# Patient Record
Sex: MALE | NOT HISPANIC OR LATINO | Employment: OTHER | URBAN - METROPOLITAN AREA
[De-identification: names, ages, dates, MRNs, and addresses within clinical notes are randomized per-mention and may not be internally consistent; named-entity substitution may affect disease eponyms.]

---

## 2020-12-04 ENCOUNTER — TELEPHONE (OUTPATIENT)
Dept: NEUROLOGY | Facility: CLINIC | Age: 65
End: 2020-12-04

## 2020-12-04 NOTE — TELEPHONE ENCOUNTER
[facility] Call Center    Phone Message    May a detailed message be left on voicemail: yes     Reason for Call: Other: Patient calling to check status of hearing from clinic. States that he has some questions and really would like to discuss with nurse and care team about who the best neurologist would be for him.     Please advise and call patient back at your earliest convenience to discuss     Action Taken: Other: [facility] NEUROLOGY    Travel Screening: Not Applicable

## 2020-12-04 NOTE — TELEPHONE ENCOUNTER
I spoke to patient about what his symptoms are and who he is hoping to see in our clinic. He was referred to neurology and was recommended to see Dr. Linda Banks. Since Dr. Banks only sees MS patients he is wondering who he should see. I spoke with him and he reviewed his symptoms with me.     He said they started about 6 months ago when his left arm and hand suddenly started freezing up. This has continued since then. He has little function and sensation in his left ring and pinky fingers. He drops things sometimes. Sometimes his left shoulder will freeze up. He has pain in his neck/shoulders bilaterally. He is tired all the time. He is having balance issues. Some trouble putting on his shirt; confusing which arm goes in which hole.     He has history of anxiety and depression so is anxious about seeing a new provider. Call center scheduled him with Dr. Gordillo. I let him know she is a wonderful provider and it would be good for him to see her.     Pt likes natural medicine/remidies and would like to incorporate this into care if possible.     I will update Dr. Gordillo on this conversation.     Vicki STYLES

## 2020-12-04 NOTE — TELEPHONE ENCOUNTER
[facility] Call Center    Phone Message    May a detailed message be left on voicemail: yes     Reason for Call: Other: Braulio calling to request a call back. He would like to speak to a nurse in regards to wanting to find a provider who specializes in functional medicine. Please call him back to discuss.      Action Taken: Message routed to:  [facility]jendra neuro     Travel Screening: Not Applicable                                                                    \

## 2020-12-08 ENCOUNTER — TELEPHONE (OUTPATIENT)
Dept: NEUROLOGY | Facility: CLINIC | Age: 65
End: 2020-12-08

## 2020-12-08 NOTE — TELEPHONE ENCOUNTER
[facility] Call [facility]    Phone Message    May a detailed message be left on voicemail: yes     Reason for Call: Other: Braulio calling to request a call back. He states his symtoms are worsening and would like to be seen as soon as possible. Pt has apt with Dr. Gordillo on 12/21. Please call him back to discuss.      Action Taken: Message routed to:  [facility]  neuro     Travel Screening: Not Applicable

## 2020-12-09 ENCOUNTER — APPOINTMENT (OUTPATIENT)
Dept: CT IMAGING | Facility: CLINIC | Age: 65
End: 2020-12-09
Attending: EMERGENCY MEDICINE
Payer: COMMERCIAL

## 2020-12-09 ENCOUNTER — HOSPITAL ENCOUNTER (INPATIENT)
Facility: CLINIC | Age: 65
LOS: 2 days | Discharge: HOME | End: 2020-12-11
Attending: EMERGENCY MEDICINE | Admitting: INTERNAL MEDICINE
Payer: COMMERCIAL

## 2020-12-09 ENCOUNTER — OFFICE VISIT (OUTPATIENT)
Dept: NEUROLOGY | Facility: CLINIC | Age: 65
End: 2020-12-09
Payer: COMMERCIAL

## 2020-12-09 ENCOUNTER — DOCUMENTATION ONLY (OUTPATIENT)
Dept: NEUROLOGY | Facility: CLINIC | Age: 65
End: 2020-12-09

## 2020-12-09 ENCOUNTER — APPOINTMENT (OUTPATIENT)
Dept: GENERAL RADIOLOGY | Facility: CLINIC | Age: 65
End: 2020-12-09
Attending: EMERGENCY MEDICINE
Payer: COMMERCIAL

## 2020-12-09 VITALS
OXYGEN SATURATION: 96 % | SYSTOLIC BLOOD PRESSURE: 228 MMHG | DIASTOLIC BLOOD PRESSURE: 145 MMHG | HEART RATE: 107 BPM | RESPIRATION RATE: 18 BRPM

## 2020-12-09 DIAGNOSIS — R20.0 LEFT UPPER EXTREMITY NUMBNESS: ICD-10-CM

## 2020-12-09 DIAGNOSIS — I63.9 CEREBROVASCULAR ACCIDENT (CVA), UNSPECIFIED MECHANISM (H): Primary | ICD-10-CM

## 2020-12-09 DIAGNOSIS — I16.0 HYPERTENSIVE URGENCY: ICD-10-CM

## 2020-12-09 DIAGNOSIS — I16.0 HYPERTENSIVE URGENCY, MALIGNANT: Primary | ICD-10-CM

## 2020-12-09 DIAGNOSIS — Z20.828 EXPOSURE TO SARS-ASSOCIATED CORONAVIRUS: ICD-10-CM

## 2020-12-09 DIAGNOSIS — R93.0 ABNORMAL HEAD CT: ICD-10-CM

## 2020-12-09 LAB
ALBUMIN SERPL-MCNC: 3.9 G/DL (ref 3.4–5)
ALP SERPL-CCNC: 101 U/L (ref 40–150)
ALT SERPL W P-5'-P-CCNC: 25 U/L (ref 0–70)
ANION GAP SERPL CALCULATED.3IONS-SCNC: 9 MMOL/L (ref 3–14)
AST SERPL W P-5'-P-CCNC: 15 U/L (ref 0–45)
BASOPHILS # BLD AUTO: 0 10E9/L (ref 0–0.2)
BASOPHILS NFR BLD AUTO: 0.3 %
BILIRUB SERPL-MCNC: 0.6 MG/DL (ref 0.2–1.3)
BUN SERPL-MCNC: 14 MG/DL (ref 7–30)
CALCIUM SERPL-MCNC: 9.2 MG/DL (ref 8.5–10.1)
CHLORIDE SERPL-SCNC: 107 MMOL/L (ref 94–109)
CO2 SERPL-SCNC: 24 MMOL/L (ref 20–32)
CREAT SERPL-MCNC: 0.9 MG/DL (ref 0.66–1.25)
DIFFERENTIAL METHOD BLD: NORMAL
EOSINOPHIL # BLD AUTO: 0 10E9/L (ref 0–0.7)
EOSINOPHIL NFR BLD AUTO: 0.2 %
ERYTHROCYTE [DISTWIDTH] IN BLOOD BY AUTOMATED COUNT: 12.1 % (ref 10–15)
GFR SERPL CREATININE-BSD FRML MDRD: >90 ML/MIN/{1.73_M2}
GLUCOSE SERPL-MCNC: 106 MG/DL (ref 70–99)
HCT VFR BLD AUTO: 48.4 % (ref 40–53)
HGB BLD-MCNC: 16.5 G/DL (ref 13.3–17.7)
IMM GRANULOCYTES # BLD: 0 10E9/L (ref 0–0.4)
IMM GRANULOCYTES NFR BLD: 0.3 %
INR PPP: 1.04 (ref 0.86–1.14)
INTERPRETATION ECG - MUSE: NORMAL
LYMPHOCYTES # BLD AUTO: 1.9 10E9/L (ref 0.8–5.3)
LYMPHOCYTES NFR BLD AUTO: 21.1 %
MAGNESIUM SERPL-MCNC: 2.6 MG/DL (ref 1.6–2.3)
MCH RBC QN AUTO: 30.1 PG (ref 26.5–33)
MCHC RBC AUTO-ENTMCNC: 34.1 G/DL (ref 31.5–36.5)
MCV RBC AUTO: 88 FL (ref 78–100)
MONOCYTES # BLD AUTO: 0.6 10E9/L (ref 0–1.3)
MONOCYTES NFR BLD AUTO: 6.8 %
NEUTROPHILS # BLD AUTO: 6.2 10E9/L (ref 1.6–8.3)
NEUTROPHILS NFR BLD AUTO: 71.3 %
NRBC # BLD AUTO: 0 10*3/UL
NRBC BLD AUTO-RTO: 0 /100
NT-PROBNP SERPL-MCNC: 91 PG/ML (ref 0–900)
PLATELET # BLD AUTO: 271 10E9/L (ref 150–450)
POTASSIUM SERPL-SCNC: 3.2 MMOL/L (ref 3.4–5.3)
PROT SERPL-MCNC: 7.5 G/DL (ref 6.8–8.8)
RADIOLOGIST FLAGS: ABNORMAL
RBC # BLD AUTO: 5.49 10E12/L (ref 4.4–5.9)
SARS-COV-2 RNA SPEC QL NAA+PROBE: NORMAL
SODIUM SERPL-SCNC: 140 MMOL/L (ref 133–144)
SPECIMEN SOURCE: NORMAL
TROPONIN I SERPL-MCNC: <0.015 UG/L (ref 0–0.04)
TSH SERPL DL<=0.005 MIU/L-ACNC: 2.1 MU/L (ref 0.4–4)
WBC # BLD AUTO: 8.8 10E9/L (ref 4–11)

## 2020-12-09 PROCEDURE — 84484 ASSAY OF TROPONIN QUANT: CPT | Performed by: EMERGENCY MEDICINE

## 2020-12-09 PROCEDURE — 120N000003 HC R&B IMCU UMMC

## 2020-12-09 PROCEDURE — 70496 CT ANGIOGRAPHY HEAD: CPT | Mod: 26 | Performed by: RADIOLOGY

## 2020-12-09 PROCEDURE — 99223 1ST HOSP IP/OBS HIGH 75: CPT | Mod: AI | Performed by: INTERNAL MEDICINE

## 2020-12-09 PROCEDURE — 83735 ASSAY OF MAGNESIUM: CPT | Performed by: EMERGENCY MEDICINE

## 2020-12-09 PROCEDURE — 99285 EMERGENCY DEPT VISIT HI MDM: CPT | Mod: 25 | Performed by: EMERGENCY MEDICINE

## 2020-12-09 PROCEDURE — 71046 X-RAY EXAM CHEST 2 VIEWS: CPT

## 2020-12-09 PROCEDURE — 70498 CT ANGIOGRAPHY NECK: CPT | Mod: 26 | Performed by: RADIOLOGY

## 2020-12-09 PROCEDURE — 71046 X-RAY EXAM CHEST 2 VIEWS: CPT | Mod: 26 | Performed by: RADIOLOGY

## 2020-12-09 PROCEDURE — 85025 COMPLETE CBC W/AUTO DIFF WBC: CPT | Performed by: EMERGENCY MEDICINE

## 2020-12-09 PROCEDURE — 93005 ELECTROCARDIOGRAM TRACING: CPT | Performed by: EMERGENCY MEDICINE

## 2020-12-09 PROCEDURE — 250N000011 HC RX IP 250 OP 636: Performed by: EMERGENCY MEDICINE

## 2020-12-09 PROCEDURE — 80053 COMPREHEN METABOLIC PANEL: CPT | Performed by: EMERGENCY MEDICINE

## 2020-12-09 PROCEDURE — 84443 ASSAY THYROID STIM HORMONE: CPT | Performed by: EMERGENCY MEDICINE

## 2020-12-09 PROCEDURE — 93010 ELECTROCARDIOGRAM REPORT: CPT | Performed by: EMERGENCY MEDICINE

## 2020-12-09 PROCEDURE — 99202 OFFICE O/P NEW SF 15 MIN: CPT | Mod: GC | Performed by: STUDENT IN AN ORGANIZED HEALTH CARE EDUCATION/TRAINING PROGRAM

## 2020-12-09 PROCEDURE — 96374 THER/PROPH/DIAG INJ IV PUSH: CPT | Mod: 59 | Performed by: EMERGENCY MEDICINE

## 2020-12-09 PROCEDURE — 83880 ASSAY OF NATRIURETIC PEPTIDE: CPT | Performed by: EMERGENCY MEDICINE

## 2020-12-09 PROCEDURE — 70450 CT HEAD/BRAIN W/O DYE: CPT | Mod: 26 | Performed by: RADIOLOGY

## 2020-12-09 PROCEDURE — 70498 CT ANGIOGRAPHY NECK: CPT

## 2020-12-09 PROCEDURE — C9803 HOPD COVID-19 SPEC COLLECT: HCPCS | Performed by: EMERGENCY MEDICINE

## 2020-12-09 PROCEDURE — 96376 TX/PRO/DX INJ SAME DRUG ADON: CPT | Performed by: EMERGENCY MEDICINE

## 2020-12-09 PROCEDURE — 85610 PROTHROMBIN TIME: CPT | Performed by: EMERGENCY MEDICINE

## 2020-12-09 PROCEDURE — U0003 INFECTIOUS AGENT DETECTION BY NUCLEIC ACID (DNA OR RNA); SEVERE ACUTE RESPIRATORY SYNDROME CORONAVIRUS 2 (SARS-COV-2) (CORONAVIRUS DISEASE [COVID-19]), AMPLIFIED PROBE TECHNIQUE, MAKING USE OF HIGH THROUGHPUT TECHNOLOGIES AS DESCRIBED BY CMS-2020-01-R: HCPCS | Performed by: EMERGENCY MEDICINE

## 2020-12-09 PROCEDURE — 70450 CT HEAD/BRAIN W/O DYE: CPT

## 2020-12-09 RX ORDER — HYDRALAZINE HYDROCHLORIDE 20 MG/ML
10 INJECTION INTRAMUSCULAR; INTRAVENOUS ONCE
Status: COMPLETED | OUTPATIENT
Start: 2020-12-09 | End: 2020-12-09

## 2020-12-09 RX ORDER — LIDOCAINE 40 MG/G
CREAM TOPICAL
Status: DISCONTINUED | OUTPATIENT
Start: 2020-12-09 | End: 2020-12-11 | Stop reason: HOSPADM

## 2020-12-09 RX ORDER — ACETAMINOPHEN 325 MG/1
650 TABLET ORAL EVERY 4 HOURS PRN
Status: DISCONTINUED | OUTPATIENT
Start: 2020-12-09 | End: 2020-12-11 | Stop reason: HOSPADM

## 2020-12-09 RX ORDER — HYDRALAZINE HYDROCHLORIDE 20 MG/ML
10 INJECTION INTRAMUSCULAR; INTRAVENOUS ONCE
Status: COMPLETED | OUTPATIENT
Start: 2020-12-09 | End: 2020-12-10

## 2020-12-09 RX ORDER — IOPAMIDOL 755 MG/ML
75 INJECTION, SOLUTION INTRAVASCULAR ONCE
Status: COMPLETED | OUTPATIENT
Start: 2020-12-09 | End: 2020-12-09

## 2020-12-09 RX ADMIN — IOPAMIDOL 75 ML: 755 INJECTION, SOLUTION INTRAVENOUS at 20:00

## 2020-12-09 RX ADMIN — HYDRALAZINE HYDROCHLORIDE 10 MG: 20 INJECTION INTRAMUSCULAR; INTRAVENOUS at 21:02

## 2020-12-09 RX ADMIN — HYDRALAZINE HYDROCHLORIDE 10 MG: 20 INJECTION INTRAMUSCULAR; INTRAVENOUS at 19:42

## 2020-12-09 ASSESSMENT — MIFFLIN-ST. JEOR: SCORE: 1769.68

## 2020-12-09 ASSESSMENT — PAIN SCALES - GENERAL: PAINLEVEL: MILD PAIN (2)

## 2020-12-09 NOTE — PATIENT INSTRUCTIONS
1. You will be transferred to the [facility] Emergency Department for blood pressure control and further work-up   2. Please follow-up with me in clinic after being seen in the ED.

## 2020-12-09 NOTE — PROGRESS NOTES
DEPARTMENT OF NEUROLOGY    Patient Name:  Braulio Guardado  MRN:  [id number]    :  1955  Date of Clinic Visit:  2020  Primary Care Provider:  No primary care provider on file.        INITIAL APPOINTMENT      HPI:   Braulio Guardado is a 65yr old male w/ PMHx severe anxiety and PTSD who doctor's infrequently who arrives in clinic today w/ complaints of L ring/pinkie finger numbness for the past 6mo. However, before visit could fully commence, vitals were taken and noted a /145 w/ . This was then checked twice manually and also noted elevated -130's. Pt endorsed feelings of anxiety, was diaphoretic, and noted pain in his R side neck. However, this last he says he has had for several weeks and is not worse than normal. He denied any HA, vision changes, hearing changes, CP, SOB, palpitations, arm/jaw numbness, other extremity numbness/weakness besides L ring/pinkie fingers, cough, rhinorrhea, recent illness, or sick contacts. A rapid response was called and EKG performed, which showed sinus tachycardia w/o ST changes    As to his past medical hx, the patient reports that he goes to the doctor very infrequently, but has never been told he had high blood pressure before. He recalls that the few times he's checked it, it may have been in the 160 range. However, he has never been placed on medications for this. He denies any other medical conditions besides severe anxiety and PTSD, saying the coming to the doctor makes him nervous. He is not currently on any medications besides various vitamin supplements (B12, Folate, Vit D, Zinc). He denies using any herbal supplements or recent drug use. However, he does endorse occasional marijuana use in the past.      Vital signs:      BP: (!) 228/145 Pulse: 107   Resp: 18 SpO2: 96 %          There is no height or weight on file to calculate BMI.  BP (!) 228/145   Pulse 107   Resp 18   SpO2 96%       Examination:     -General: Sitting in chair,  anxious appearing, diaphoretic    -HEENT: Normocephalic. PERRL. No skin discolorations noted, no carotid bruit    -Cardio: Regular rhythm, tachycardic, without murmurs or bruits     -Pulmonary: Symmetric chest rise, no increased work of breathing, lungs clear to auscultation bilterally    -Abdomen: Positive bowel sounds, soft, non-tender, no organomegaly    -Neurological:     --MS: Patient is alert, attentive, and oriented. Speech is clear and fluid.     --CNs: Visual fields are full to confrontation. Pupils are briskly reactive to light. Visual fields full. Ocular motility full without nystagmus, facial sensation intact, muscles of mastication and facial expression normal, hearing intact, palate elevation normal, sternomastoid and trapezius function normal, tongue motions normal     --Motor: Normal muscle tone and bulk. 5/5 muscle strength bilaterally including  strength and finger spread     --Reflexes: Deferred    --Sensory: Light touch sensation intact and symmetric in bilateral extremities except for L ring/pinkie finger which are ~50% decreased     --Coordination: Deferred    --Gait: Stands with feet normally spaced. Gait is steady.      INVESTIGATIONS:  All available and relevant labs, imaging, and other procedures were reviewed with the patient at this visit. Those of particular significance are listed below:    EKG (12/9/2020): Sinus tachycardia w/o ST changes, possible LVH    IMPRESSION/RECOMMENDATIONS:   Braulio Guardado is a 65yr old male w/ PMHx severe anxiety and PTSD who doctor's infrequently who arrives in clinic today w/ complaints of L ring/pinkie finger numbness for the past 6mo but was found to be in HTN Emergency on exam. While patient denied any symptoms of end-organ damage associated w/ this high BP, its level of elevation is concerning enough that I believe he should be seen in the ED emergently for blood pressure control and further work-up. EKG here is neg for ischemic changes and notes  only sinus tachycardia. Currently, pt is mentating normally and continues to deny symptoms concerning for ACS. Rapid response called for stabilization and transport to the ED. Potentially pt's blood pressure is a combination of as-yet undiagnosed underlying HTN and increased anxiety with doctor visit, though other etiologies should be investigated. I have already contacted [facility] ED to give sign-out and inform them of pt's imminent arrival    I do not believe the pt's L ring/pinkie finger numbness is associated with his current HTN. From the brief history and exam taken concerning this numbness, it sounds most congruent with ulnar nerve compression. However, as a formal exam and further history could not be obtained, I recommend that he re-schedule an appointment after being seen in the ED.    Patient has been seen with Dr. Irwin Perez who agrees with my assessment and plan    Elena Mejia MD  [facility] Department of Neurology PGY1  Pager: [phone]      Physician Attestation   I, Irwin Perez, DO, saw this patient and agree with the findings and plan of care as documented in the note.      65-year-old male who self referred to neurology for 6 months of left arm tingling.  He states he has not really seen a doctor in the last 5 years.  On arrival his blood pressure was 230/145 which was confirmed on manual blood pressure testing by myself and the resident.  He appeared anxious and sweaty.  I swiftly evaluated his left upper extremity symptoms and he reports numbness mostly involving the fourth and fifth digits.  He has no significant weakness.  Due to significantly high blood pressure, a rapid response was called, EKG was checked, and patient was transferred to the ED.  His left upper extremity numbness was not fully evaluated.  May represent ulnar neuropathy given the location involving 4th and 5th digits.  Very mild ADM weakness as well.  However given very high BP would  probably recommend brain imaging as well to look for CNS source.   I also messaged schedulers and he has follow-up scheduled with Dr. Gordillo on 12/21 when bp better controlled to fully evaluate his left upper extremity symptoms in consultation.      Total time spent face-to-face with patient discussing red flag symptoms and rechecking blood pressure as well as coordinating response team for ED transfer:  20 minutes.    Items personally reviewed/procedural attestation: vitals, labs and EKG and agree with the interpretation documented in the note.    Irwin Perez, DO

## 2020-12-09 NOTE — ED PROVIDER NOTES
[facility] EMERGENCY DEPARTMENT ([facility])  12/09/20  History     Chief Complaint   Patient presents with     Hypertension     Numbness     The history is provided by the patient.     Braulio Guardado is a 65 year old male with a medical history signficant for anxiety and PTSD, who has been dealing w/ 6 months of LUE/finger numbness, but know other known diagnoses (says isn't evaluated medically often), presents to the Emergency Department from clinic for evaluation of his LUE numbness and significant hypertension found incidentally at clinic.     Per chart review, patient has seen Neurology (12/04/2020) due to complaints of left arm and hand numbness for the past 6 months. Prior to the arrival to [facility] ED, patient saw [facility] Neurology today due to complaints of numbness and tingling in his left hand for the last few months. At that appointment, they performed vitals, and found him to be significantly hypertensive. BPs 220's/140's. EKG performed in clinic reportedly showed sinus tachycardia w/o ST changes. He was then sent to the ED from clinic for further evaluation/management.     Currently patient reports feeling OK. He has a history of some strong anxiety, and so this has all been stressful for him, but no other acute MH concerns. The LUE numbness is located primarily in the 5th, maybe 4th digit. No weakness noted. Has been ongoing for 6 months, no recent changes. No CP or breathing sx's. No fevers or chills. No bowel/bladder sx's. No vision/hearing changes or HAs. No syncope/near syncope. No abdominal/GI sx's. No rashes/skin sx's. No neck stiffness or albert pain, but will get muscle cramps bilateral paraspinal/trapezius areas bilaterally sometimes (like since sitting in the hospital gurney, etc.). No exertional or pleuritic sx's. Not on any meds. Pt reportedly does not go in for regular medical evaluations. No other symptoms or complaints at this time. Please see ROS for further details.    I have  "reviewed the Medications, Allergies, Past Medical and Surgical History, and Social History in the [facility] system.    PAST MEDICAL HISTORY: Anxiety, PTSD  - 6 months of LUE/finger numbness (no formal diagnosis yet)    PAST SURGICAL HISTORY: No past surgical history on file.    Past medical history, past surgical history, medications, and allergies were reviewed with the patient.     FAMILY HISTORY: No family history on file.    SOCIAL HISTORY:   Social History     Tobacco Use     Smoking status: Not on file   Substance Use Topics     Alcohol use: Not on file     Social history was reviewed with the patient.       Patient's Medications    No medications on file        No Known Allergies     Review of Systems   Constitutional: Negative for fatigue and fever.   HENT: Negative.    Eyes: Negative for visual disturbance.   Respiratory: Negative for cough and shortness of breath.    Cardiovascular: Negative for chest pain.   Gastrointestinal: Negative for nausea and vomiting.   Genitourinary: Negative.    Musculoskeletal: Negative for neck stiffness.        Cramping in neck muscles   Skin: Negative for color change and rash.   Allergic/Immunologic: Negative for immunocompromised state.   Neurological: Positive for numbness (LUE, 5th/4th digitis). Negative for seizures, syncope, weakness, light-headedness and headaches.   Psychiatric/Behavioral: Negative for suicidal ideas. The patient is nervous/anxious.    All other systems reviewed and are negative.        Physical Exam   BP: (!) 216/136  Pulse: 85  Temp: 98.4  F (36.9  C)  Resp: 18  Height: 180.3 cm (5' 11\")  Weight: 95.3 kg (210 lb)  SpO2: 94 %      Physical Exam  CONSTITUTIONAL: Well-developed and well-nourished. Awake and alert. Non-toxic appearance. No acute distress.   HENT:   - Head: Normocephalic and atraumatic.   - Ears: Hearing and external ear grossly normal.   - Nose: Nose normal. No rhinorrhea. No epistaxis.   - Mouth/Throat: MMM  EYES: Conjunctivae and lids are " normal. No scleral icterus.   NECK: Normal range of motion and phonation normal. Neck supple.  No tracheal deviation, no stridor. No edema or erythema noted.  CARDIOVASCULAR: Normal rate, regular rhythm and no appreciable abnormal heart sounds.  PULMONARY/CHEST: Normal work of breathing. No accessory muscle usage or stridor. No respiratory distress.  No appreciable abnormal breath sounds.  ABDOMEN: Soft, non-distended. No tenderness. No rigidity, rebound or guarding.   MUSCULOSKELETAL: Extremities warm and seemingly well perfused. No edema or calf tenderness.  NEUROLOGIC: Awake, alert. Not disoriented. He displays no atrophy and no tremor. Normal tone. No seizure activity. GCS 15, reports numbness left pinky, part of ring finger. No strength/weakness.   SKIN: Skin is warm and dry. No rash noted. No diaphoresis. No pallor.   PSYCHIATRIC: Normal mood and affect. Speech and behavior normal. Thought processes linear. Cognition and memory are normal.   ED Course          EKG Interpretation:      Interpreted by Noy Chowdhury MD  Time reviewed: 18:04  Symptoms at time of EKG: Numbness   Rhythm: normal sinus   Rate: 73  Axis: Normal  Ectopy: none  Conduction: normal  ST Segments/ T Waves: T wave inversion III and aVF  Comparison to prior: No previous  Clinical Impression: Sinus, T Wave Inversions in III and avf         Assessments & Plan (with Medical Decision Making)   IMPRESSION: 65 year old male w/ PMH notable for anxiety and PTSD, who has been dealing w/ 6 months of LUE/finger numbness, but know other known diagnoses (says isn't evaluated medically often), presents to the Emergency Department from clinic for evaluation of his LUE numbness and significant hypertension found incidentally at clinic.     Clinically, patient appears nontoxic, NAD . Vitals show BP elevated to 220's/130-140's, otherwise no acute findings on vitals. Otherwise on examination, has the reported altered sensation left pink/5th fingers, but  "otherwise no acute findings.     Ddx includes, but not limited to, hypertensive urgency or emergency, chronic strokes in setting of HTN, ICH, malignancy, metabolic/endocrine derangement, et al.     PLAN: ECG, labs, head/neck imaging, chest imaging  - Disposition pending ED course and findings.   - Ongoing BP monitoring and judicious antihypertensive management    RESULTS:  - Labs: No acute findings outside of mild hypokalemia, slight elevation of Mg  - Imaging: Written preliminary reports reviewed:  --- CXR: \"Negative chest.\"  --- CT Head: \"Multifocal ill-defined regions of patchy hypodensity at the gray-white matter junction in the anterior right parietal lobe and peripheral right parieto-occipital lobe. Smaller hypodense focus in the left subcortical white matter. These findings would be atypical for cerebral infarct. Recommend contrast enhanced MRI for further characterization.\"  --- CTA Head Neck:  \"1. Head CTA demonstrates no aneurysm or stenosis of the major intracranial arteries. 2. Neck CTA demonstrates no stenosis of the major cervical arteries.\"  --- Results/reports reviewed w/ patient who expresses understanding of findings and F/U recommendations.    INTERVENTIONS:   - IV Hydralazine (10mg IV x2)    RE-EVALUATION:  - Pt otherwise continues to do well here in the ED, no acute issues or apparent concerning changes in vitals or clinical appearance.    DISCUSSIONS:  - w/ IM: Reviewed patient/presentation, current state of workup/any pending studies. They will admit for further evaluation/management, F/U pending studies as needed, coordinate w/ consulting services as needed. No additional requests/recommendations for workup/management for in the ED at this time.   - w/ Patient: I have reviewed the available findings, plan with the patient. He expressed understanding and agreement with this plan. All questions answered to the best of our ability at this time.     DISPOSITION/PLANNING:  - IMPRESSION: " Hypertensive Urgency, brain lesions/findings/abnormal head CT, LUE (finger numbness), anxiety  - DISPOSITION: Admit to IM for further evaluation/management  - PENDING: Asymptomatic COVID  - RECOMMENDATIONS: Neuro consult, MRI (w/ contrast, head/neck) in the morning      ______________________________________________________________________      12/9/2020   [facility] EMERGENCY DEPARTMENT     Noy Chowdhury MD  12/11/20 [id number]

## 2020-12-09 NOTE — ED NOTES
Bed: ED18  Expected date: 12/9/20  Expected time: 4:45 PM  Means of arrival: Ambulance  Comments:  HE     65M with hypertensive crisis from clinic

## 2020-12-09 NOTE — TELEPHONE ENCOUNTER
I returned pt call to discuss a sooner visit. He said his symptoms are worsening and he would like to see a provider to get treatment started. I helped him schedule with Dr. Mejia in our neurology resident clinic this afternoon 12/9 at 2:30p.     Vicki STYLES

## 2020-12-09 NOTE — LETTER
2020       RE: Braulio Guardado  [address] [place] [address]     Dear Colleague,    Thank you for referring your patient, Braulio Guardado, to the [facility] [place] at [facility]. Please see a copy of my visit note below.      DEPARTMENT OF NEUROLOGY    Patient Name:  Braulio Guardado  MRN:  [id number]    :  1955  Date of Clinic Visit:  2020  Primary Care Provider:  No primary care provider on file.    INITIAL APPOINTMENT    HPI:   Braulio Guardado is a 65yr old male w/ PMHx severe anxiety and PTSD who doctor's infrequently who arrives in clinic today w/ complaints of L ring/pinkie finger numbness for the past 6mo. However, before visit could fully commence, vitals were taken and noted a /145 w/ . This was then checked twice manually and also noted elevated -130's. Pt endorsed feelings of anxiety, was diaphoretic, and noted pain in his R side neck. However, this last he says he has had for several weeks and is not worse than normal. He denied any HA, vision changes, hearing changes, CP, SOB, palpitations, arm/jaw numbness, other extremity numbness/weakness besides L ring/pinkie fingers, cough, rhinorrhea, recent illness, or sick contacts. A rapid response was called and EKG performed, which showed sinus tachycardia w/o ST changes    As to his past medical hx, the patient reports that he goes to the doctor very infrequently, but has never been told he had high blood pressure before. He recalls that the few times he's checked it, it may have been in the 160 range. However, he has never been placed on medications for this. He denies any other medical conditions besides severe anxiety and PTSD, saying the coming to the doctor makes him nervous. He is not currently on any medications besides various vitamin supplements (B12, Folate, Vit D, Zinc). He denies using any herbal supplements or recent drug use.  However, he does endorse occasional marijuana use in the past.    Vital signs:      BP: (!) 228/145 Pulse: 107   Resp: 18 SpO2: 96 %          There is no height or weight on file to calculate BMI.  BP (!) 228/145   Pulse 107   Resp 18   SpO2 96%       Examination:     -General: Sitting in chair, anxious appearing, diaphoretic    -HEENT: Normocephalic. PERRL. No skin discolorations noted, no carotid bruit    -Cardio: Regular rhythm, tachycardic, without murmurs or bruits     -Pulmonary: Symmetric chest rise, no increased work of breathing, lungs clear to auscultation bilterally    -Abdomen: Positive bowel sounds, soft, non-tender, no organomegaly    -Neurological:     --MS: Patient is alert, attentive, and oriented. Speech is clear and fluid.     --CNs: Visual fields are full to confrontation. Pupils are briskly reactive to light. Visual fields full. Ocular motility full without nystagmus, facial sensation intact, muscles of mastication and facial expression normal, hearing intact, palate elevation normal, sternomastoid and trapezius function normal, tongue motions normal     --Motor: Normal muscle tone and bulk. 5/5 muscle strength bilaterally including  strength and finger spread     --Reflexes: Deferred    --Sensory: Light touch sensation intact and symmetric in bilateral extremities except for L ring/pinkie finger which are ~50% decreased     --Coordination: Deferred    --Gait: Stands with feet normally spaced. Gait is steady.      INVESTIGATIONS:  All available and relevant labs, imaging, and other procedures were reviewed with the patient at this visit. Those of particular significance are listed below:    EKG (12/9/2020): Sinus tachycardia w/o ST changes, possible LVH    IMPRESSION/RECOMMENDATIONS:   Braulio Guardado is a 65yr old male w/ PMHx severe anxiety and PTSD who doctor's infrequently who arrives in clinic today w/ complaints of L ring/pinkie finger numbness for the past 6mo but was found to be in  HTN Emergency on exam. While patient denied any symptoms of end-organ damage associated w/ this high BP, its level of elevation is concerning enough that I believe he should be seen in the ED emergently for blood pressure control and further work-up. EKG here is neg for ischemic changes and notes only sinus tachycardia. Currently, pt is mentating normally and continues to deny symptoms concerning for ACS. Rapid response called for stabilization and transport to the ED. Potentially pt's blood pressure is a combination of as-yet undiagnosed underlying HTN and increased anxiety with doctor visit, though other etiologies should be investigated. I have already contacted [facility] ED to give sign-out and inform them of pt's imminent arrival    I do not believe the pt's L ring/pinkie finger numbness is associated with his current HTN. From the brief history and exam taken concerning this numbness, it sounds most congruent with ulnar nerve compression. However, as a formal exam and further history could not be obtained, I recommend that he re-schedule an appointment after being seen in the ED.    Patient has been seen with Dr. Irwin Perez who agrees with my assessment and plan    Elena Mejia MD  [facility] Department of Neurology PGY1  Pager: [phone]      Physician Attestation   I, Irwin Perez, DO, saw this patient and agree with the findings and plan of care as documented in the note.      65-year-old male who self referred to neurology for 6 months of left arm tingling.  He states he has not really seen a doctor in the last 5 years.  On arrival his blood pressure was 230/145 which was confirmed on manual blood pressure testing by myself and the resident.  He appeared anxious and sweaty.  I swiftly evaluated his left upper extremity symptoms and he reports numbness mostly involving the fourth and fifth digits.  He has no significant weakness.  Due to significantly high blood pressure, a  rapid response was called, EKG was checked, and patient was transferred to the ED.  His left upper extremity numbness was not fully evaluated.  May represent ulnar neuropathy given the location involving 4th and 5th digits.  Very mild ADM weakness as well.  However given very high BP would probably recommend brain imaging as well to look for CNS source.   I also messaged schedulers and he has follow-up scheduled with Dr. Gordillo on 12/21 when bp better controlled to fully evaluate his left upper extremity symptoms in consultation.      Total time spent face-to-face with patient discussing red flag symptoms and rechecking blood pressure as well as coordinating response team for ED transfer:  20 minutes.    Items personally reviewed/procedural attestation: vitals, labs and EKG and agree with the interpretation documented in the note.    Irwin Perez, DO

## 2020-12-09 NOTE — ED TRIAGE NOTES
Patient arrives via EMS from neurology clinic and had a BP of 214/130. Patient was being seen for L hand numbness for the past 6 months, and worsening balance & cognition. Patient does not see a doctor regularly, not on any home medication. Of note, patient has been struggling with depression & anxiety. EKG sinus tach.    Yocasta Weinstein RN on 12/9/2020 at 4:58 PM

## 2020-12-10 ENCOUNTER — APPOINTMENT (OUTPATIENT)
Dept: CARDIOLOGY | Facility: CLINIC | Age: 65
End: 2020-12-10
Attending: STUDENT IN AN ORGANIZED HEALTH CARE EDUCATION/TRAINING PROGRAM
Payer: COMMERCIAL

## 2020-12-10 ENCOUNTER — APPOINTMENT (OUTPATIENT)
Dept: ULTRASOUND IMAGING | Facility: CLINIC | Age: 65
End: 2020-12-10
Attending: STUDENT IN AN ORGANIZED HEALTH CARE EDUCATION/TRAINING PROGRAM
Payer: COMMERCIAL

## 2020-12-10 ENCOUNTER — APPOINTMENT (OUTPATIENT)
Dept: MRI IMAGING | Facility: CLINIC | Age: 65
End: 2020-12-10
Payer: COMMERCIAL

## 2020-12-10 LAB
ANION GAP SERPL CALCULATED.3IONS-SCNC: 7 MMOL/L (ref 3–14)
BUN SERPL-MCNC: 12 MG/DL (ref 7–30)
CALCIUM SERPL-MCNC: 9.3 MG/DL (ref 8.5–10.1)
CHLORIDE SERPL-SCNC: 107 MMOL/L (ref 94–109)
CHOLEST SERPL-MCNC: 291 MG/DL
CO2 SERPL-SCNC: 25 MMOL/L (ref 20–32)
CREAT SERPL-MCNC: 0.82 MG/DL (ref 0.66–1.25)
GFR SERPL CREATININE-BSD FRML MDRD: >90 ML/MIN/{1.73_M2}
GLUCOSE SERPL-MCNC: 128 MG/DL (ref 70–99)
HBA1C MFR BLD: 5.2 % (ref 0–5.6)
HDLC SERPL-MCNC: 42 MG/DL
LABORATORY COMMENT REPORT: NORMAL
LDLC SERPL CALC-MCNC: 231 MG/DL
NONHDLC SERPL-MCNC: 249 MG/DL
POTASSIUM SERPL-SCNC: 3.4 MMOL/L (ref 3.4–5.3)
SARS-COV-2 RNA SPEC QL NAA+PROBE: NEGATIVE
SODIUM SERPL-SCNC: 139 MMOL/L (ref 133–144)
SPECIMEN SOURCE: NORMAL
TRIGL SERPL-MCNC: 89 MG/DL

## 2020-12-10 PROCEDURE — 93306 TTE W/DOPPLER COMPLETE: CPT | Mod: 26 | Performed by: INTERNAL MEDICINE

## 2020-12-10 PROCEDURE — 250N000011 HC RX IP 250 OP 636: Performed by: STUDENT IN AN ORGANIZED HEALTH CARE EDUCATION/TRAINING PROGRAM

## 2020-12-10 PROCEDURE — 250N000013 HC RX MED GY IP 250 OP 250 PS 637: Performed by: STUDENT IN AN ORGANIZED HEALTH CARE EDUCATION/TRAINING PROGRAM

## 2020-12-10 PROCEDURE — 80048 BASIC METABOLIC PNL TOTAL CA: CPT | Performed by: STUDENT IN AN ORGANIZED HEALTH CARE EDUCATION/TRAINING PROGRAM

## 2020-12-10 PROCEDURE — 255N000002 HC RX 255 OP 636: Performed by: INTERNAL MEDICINE

## 2020-12-10 PROCEDURE — A9585 GADOBUTROL INJECTION: HCPCS | Performed by: INTERNAL MEDICINE

## 2020-12-10 PROCEDURE — 93880 EXTRACRANIAL BILAT STUDY: CPT | Mod: 26 | Performed by: RADIOLOGY

## 2020-12-10 PROCEDURE — 72156 MRI NECK SPINE W/O & W/DYE: CPT | Mod: 26 | Performed by: RADIOLOGY

## 2020-12-10 PROCEDURE — 83036 HEMOGLOBIN GLYCOSYLATED A1C: CPT | Performed by: STUDENT IN AN ORGANIZED HEALTH CARE EDUCATION/TRAINING PROGRAM

## 2020-12-10 PROCEDURE — 80061 LIPID PANEL: CPT | Performed by: STUDENT IN AN ORGANIZED HEALTH CARE EDUCATION/TRAINING PROGRAM

## 2020-12-10 PROCEDURE — 93880 EXTRACRANIAL BILAT STUDY: CPT

## 2020-12-10 PROCEDURE — 99233 SBSQ HOSP IP/OBS HIGH 50: CPT | Mod: GC | Performed by: INTERNAL MEDICINE

## 2020-12-10 PROCEDURE — 72156 MRI NECK SPINE W/O & W/DYE: CPT

## 2020-12-10 PROCEDURE — 120N000003 HC R&B IMCU UMMC

## 2020-12-10 PROCEDURE — 96375 TX/PRO/DX INJ NEW DRUG ADDON: CPT | Performed by: EMERGENCY MEDICINE

## 2020-12-10 PROCEDURE — 70553 MRI BRAIN STEM W/O & W/DYE: CPT

## 2020-12-10 PROCEDURE — 36415 COLL VENOUS BLD VENIPUNCTURE: CPT | Performed by: STUDENT IN AN ORGANIZED HEALTH CARE EDUCATION/TRAINING PROGRAM

## 2020-12-10 PROCEDURE — 70553 MRI BRAIN STEM W/O & W/DYE: CPT | Mod: 26 | Performed by: RADIOLOGY

## 2020-12-10 PROCEDURE — 250N000011 HC RX IP 250 OP 636: Performed by: INTERNAL MEDICINE

## 2020-12-10 PROCEDURE — 99222 1ST HOSP IP/OBS MODERATE 55: CPT | Mod: GC | Performed by: PSYCHIATRY & NEUROLOGY

## 2020-12-10 PROCEDURE — 999N000208 ECHOCARDIOGRAM COMPLETE

## 2020-12-10 PROCEDURE — 96376 TX/PRO/DX INJ SAME DRUG ADON: CPT | Performed by: EMERGENCY MEDICINE

## 2020-12-10 RX ORDER — LABETALOL HYDROCHLORIDE 5 MG/ML
10 INJECTION, SOLUTION INTRAVENOUS ONCE
Status: COMPLETED | OUTPATIENT
Start: 2020-12-10 | End: 2020-12-10

## 2020-12-10 RX ORDER — ASPIRIN 81 MG/1
81 TABLET, CHEWABLE ORAL DAILY
Status: DISCONTINUED | OUTPATIENT
Start: 2020-12-11 | End: 2020-12-11 | Stop reason: HOSPADM

## 2020-12-10 RX ORDER — ACYCLOVIR 200 MG/1
10 CAPSULE ORAL ONCE
Status: DISCONTINUED | OUTPATIENT
Start: 2020-12-10 | End: 2020-12-11 | Stop reason: HOSPADM

## 2020-12-10 RX ORDER — LORAZEPAM 0.5 MG/1
0.5 TABLET ORAL ONCE
Status: COMPLETED | OUTPATIENT
Start: 2020-12-10 | End: 2020-12-10

## 2020-12-10 RX ORDER — AMLODIPINE BESYLATE 5 MG/1
5 TABLET ORAL DAILY
Status: DISCONTINUED | OUTPATIENT
Start: 2020-12-10 | End: 2020-12-11 | Stop reason: HOSPADM

## 2020-12-10 RX ORDER — ATORVASTATIN CALCIUM 40 MG/1
40 TABLET, FILM COATED ORAL EVERY EVENING
Status: DISCONTINUED | OUTPATIENT
Start: 2020-12-10 | End: 2020-12-11 | Stop reason: HOSPADM

## 2020-12-10 RX ORDER — GADOBUTROL 604.72 MG/ML
0.1 INJECTION INTRAVENOUS ONCE
Status: COMPLETED | OUTPATIENT
Start: 2020-12-10 | End: 2020-12-10

## 2020-12-10 RX ORDER — ASPIRIN 325 MG
325 TABLET ORAL ONCE
Status: COMPLETED | OUTPATIENT
Start: 2020-12-10 | End: 2020-12-10

## 2020-12-10 RX ORDER — LABETALOL HYDROCHLORIDE 5 MG/ML
10 INJECTION, SOLUTION INTRAVENOUS EVERY 4 HOURS PRN
Status: DISCONTINUED | OUTPATIENT
Start: 2020-12-10 | End: 2020-12-11 | Stop reason: HOSPADM

## 2020-12-10 RX ADMIN — AMLODIPINE BESYLATE 5 MG: 5 TABLET ORAL at 16:10

## 2020-12-10 RX ADMIN — HYDRALAZINE HYDROCHLORIDE 10 MG: 20 INJECTION INTRAMUSCULAR; INTRAVENOUS at 03:11

## 2020-12-10 RX ADMIN — GADOBUTROL 9.5 ML: 604.72 INJECTION INTRAVENOUS at 06:22

## 2020-12-10 RX ADMIN — ATORVASTATIN CALCIUM 40 MG: 40 TABLET, FILM COATED ORAL at 19:44

## 2020-12-10 RX ADMIN — LORAZEPAM 0.5 MG: 0.5 TABLET ORAL at 05:29

## 2020-12-10 RX ADMIN — ASPIRIN 325 MG ORAL TABLET 325 MG: 325 PILL ORAL at 11:06

## 2020-12-10 RX ADMIN — LABETALOL HYDROCHLORIDE 10 MG: 5 INJECTION, SOLUTION INTRAVENOUS at 12:20

## 2020-12-10 RX ADMIN — LABETALOL HYDROCHLORIDE 10 MG: 5 INJECTION, SOLUTION INTRAVENOUS at 11:34

## 2020-12-10 ASSESSMENT — ACTIVITIES OF DAILY LIVING (ADL)
ADLS_ACUITY_SCORE: 16
ADLS_ACUITY_SCORE: 16

## 2020-12-10 ASSESSMENT — MIFFLIN-ST. JEOR: SCORE: 1730.13

## 2020-12-10 NOTE — ED NOTES
[facility]    ED Nurse to Floor Handoff     Braulio Guardado is a 65 year old male who speaks English and lives alone,  in a home  They arrived in the ED by ambulance from home    ED Chief Complaint: Hypertension and Numbness    ED Dx;   Final diagnoses:   Hypertensive urgency   Left upper extremity numbness   Abnormal head CT         Needed?: No    Allergies: No Known Allergies.  Past Medical Hx: No past medical history on file.   Baseline Mental status: WDL  Current Mental Status changes: at basesline    Infection present or suspected this encounter: no  Sepsis suspected: No  Isolation type: No active isolations  Patient tested for COVID 19 prior to admission: YES     Activity level - Baseline/Home:  Independent  Activity Level - Current:   Independent    Bariatric equipment needed?: No    In the ED these meds were given:   Medications   lidocaine 1 % 0.1-1 mL (has no administration in time range)   lidocaine (LMX4) cream (has no administration in time range)   sodium chloride (PF) 0.9% PF flush 3 mL (has no administration in time range)   sodium chloride (PF) 0.9% PF flush 3 mL (has no administration in time range)   melatonin tablet 1 mg (has no administration in time range)   acetaminophen (TYLENOL) tablet 650 mg (has no administration in time range)   hydrALAZINE (APRESOLINE) injection 10 mg (10 mg Intravenous Given 12/9/20 1942)   iopamidol (ISOVUE-370) solution 75 mL (75 mLs Intravenous Given 12/9/20 2000)   sodium chloride (PF) 0.9% PF flush 90 mL (90 mLs Intravenous Given 12/9/20 2000)   hydrALAZINE (APRESOLINE) injection 10 mg (10 mg Intravenous Given 12/9/20 2102)       Drips running?  No    Home pump  No    Current LDAs       Labs results:   Labs Ordered and Resulted from Time of ED Arrival Up to the Time of Departure from the ED   COMPREHENSIVE METABOLIC PANEL - Abnormal; Notable for the following components:       Result Value    Potassium 3.2  (*)     Glucose 106 (*)     All other components within normal limits   MAGNESIUM - Abnormal; Notable for the following components:    Magnesium 2.6 (*)     All other components within normal limits   CBC WITH PLATELETS DIFFERENTIAL   INR   NT PROBNP INPATIENT   TROPONIN I   TSH WITH FREE T4 REFLEX   COVID-19 VIRUS (CORONAVIRUS) BY PCR   IP ASSIGN PROVIDER TEAM TO TREATMENT TEAM   CARDIAC CONTINUOUS MONITORING   VITAL SIGNS   PERIPHERAL IV CATHETER   ACTIVITY   VITAL SIGNS   APPLY PNEUMATIC COMPRESSION DEVICE (PCD)       Imaging Studies:   Recent Results (from the past 24 hour(s))   Chest XR,  PA & LAT    Narrative    EXAM: XR CHEST 2 VW  LOCATION: [facility]  DATE/TIME: 12/9/2020 7:52 PM    INDICATION: Hypertension, fatigue  COMPARISON: None.      Impression    IMPRESSION: Negative chest.   Head CT w/o contrast   Result Value    Radiologist flags As above (Urgent)    Narrative    CT HEAD W/O CONTRAST 12/9/2020 8:14 PM    History: LUE numbness, HTN   ICD-10:    Comparison: Same-day CTA of the head    Technique: Using multidetector thin collimation helical acquisition  technique, axial, coronal and sagittal CT images from the skull base  to the vertex were obtained without intravenous contrast.    Findings: There is no intracranial hemorrhage, mass effect, or midline  shift. Multifocal ill-defined regions of hypodensity at the gray-white  matter junctions, for example hypodensity in the gray-white matter  junction of the anterior right parietal lobe, ill-defined patchy  hypodensity in the peripheral right parieto-occipital gray-white  matter junction and smaller hypodense focus in the left subcortical  white matter. Ventricles are proportionate to the cerebral sulci. The  basal cisterns are clear.    The bony calvaria and the bones of the skull base are normal. The  visualized portions of the paranasal sinuses and mastoid air cells are  clear.       Impression    Impression:  Multifocal ill-defined  regions of patchy hypodensity at the gray-white  matter junction in the anterior right parietal lobe and peripheral  right parieto-occipital lobe. Smaller hypodense focus in the left  subcortical white matter. These findings would be atypical for  cerebral infarct. Recommend contrast enhanced MRI for further  characterization.    [Urgent Result: As above]    Finding was identified on 12/9/2020 8:17 PM.     Dr. Chowdhury was contacted by Dr. Craven at 12/9/2020 8:33 PM and  verbalized understanding of the urgent finding.     I have personally reviewed the examination and initial interpretation  and I agree with the findings.    JS BRADSHAW MD   CTA Head Neck with Contrast    Narrative    CTA  HEAD NECK WITH CONTRAST 12/9/2020 8:25 PM    CT angiogram of the neck   CT angiogram of the base of the brain with contrast  Reconstruction by the Radiologist on the 3D workstation    Provided History:  HTN, neck pain, 6 months LUE numbness  ICD-10:    Comparison:  Same-day head CT.      Technique:  HEAD and NECK CTA: During rapid bolus intravenous injection of  nonionic contrast material, axial images were obtained using thin  collimation multidetector helical technique from the base of the upper  aortic arch through the [place]. This CT angiogram data was  reconstructed at thin intervals with mild overlap. Images were sent to  the [employer] workstation, and 3D reconstructions were obtained. The  axial source images, multiplanar reformations, 3D reconstructions in  both maximum intensity projection display and volume rendered models  were reviewed, with reconstructions performed by the technologist and  the radiologist.    Contrast: iopamidol (ISOVUE-370) solution 75 mL    Findings:    Head CTA demonstrates no aneurysm or stenosis of the major  intracranial arteries. Anterior communicating artery is patent. Left  posterior communicating artery is not well-visualized. Right posterior  commuting artery is  "patent.    Neck CTA demonstrates no stenosis of the major cervical arteries. The  origins of the great vessels from the aortic arch are patent. The  normal distal right internal carotid artery measures 5 mm. The normal  distal left internal carotid artery measures 5 mm.     No mass within the visualized portions of the cervical soft tissues or  lung apices.       Impression    Impression:    1. Head CTA demonstrates no aneurysm or stenosis of the major  intracranial arteries.   2. Neck CTA demonstrates no stenosis of the major cervical arteries.    I have personally reviewed the examination and initial interpretation  and I agree with the findings.    JS BRADSHAW MD       Recent vital signs:   BP (!) 191/108   Pulse 98   Temp 98.4  F (36.9  C) (Oral)   Resp (!) 34   Ht 1.803 m (5' 11\")   Wt 95.3 kg (210 lb)   SpO2 96%   BMI 29.29 kg/m      [place] Coma Scale Score: 15 (12/09/20 1658)       Cardiac Rhythm: Normal Sinus  Pt needs tele? Yes  Skin/wound Issues: None    Code Status: Full Code    Pain control: pt had none    Nausea control: pt had none    Abnormal labs/tests/findings requiring intervention: abnormal head CT & hypertensive    Family present during ED course? No   Family Comments/Social Situation comments: has dog at home alone     Tasks needing completion: None    Yocasta Weinstein, RN  [phone] [place] [facility]  [phone] [facility] ED      "

## 2020-12-10 NOTE — PROGRESS NOTES
[facility] [place]     Daily Progress Note - [facility] Service        Date of Admission: 12/10/20      Assessment & Plan  65M with history of anxiety and PTSD who presents with hypertensive urgency and is found to have multifocal brain lesions concerning for subacute to chronic infarcts.    Multiple suspected subacute-chronic strokes  CT head on presentation demonstrated multifocal patchy hypodensities on the right side.  MR characteristics most suspicious for late subacute to chronic infarctions.  Lesions also noted to have evidence of laminar necrosis.  Radiology did recommend follow-up in 6 to 8 weeks to ensure resolution and exclude underlying mass lesion.  Though patient did initially presented to neurology clinic for mild left-sided neurologic symptoms, it is unclear if these are related or incidental.  Patient does not have a significant deficits at this time.  - Check lipids and A1c  - TTE with bubble study  - Carotid artery ultrasound  - Aspirin 325 mg now, 81 mg daily to start tomorrow  - Atorvastatin 40 mg daily  - Permissive hypertension today, will ultimately need blood pressure control  - Neurology consult, appreciate recs  - PT/OT/ST    Left-sided ring and pinky finger numbness  Patient had initially presented to neurology clinic to have this assessed.  This seems to be ongoing at this time, though strength active exam does not reveal significant deficits.  The symptoms have been going on for months, so between chronicity and ulnar nerve distribution of symptoms it is possible that this is not related to the imaging findings.  - Neurology consult as above    Hypertensive urgency  Patient presented with blood pressures of 200s/140s.  Treated with IV hydralazine in the ED.  Allowed to maintain blood pressures greater than 170s systolic and greater than 110s diastolic overnight and through today.  Has not followed regularly with a doctor, so does not have a known diagnosis of  "hypertension, likely that this is longstanding.  He will need blood pressure control and close follow-up on discharge for medication titration.  - Start amlodipine 5 mg this evening  - Labetalol prn for overnight    Hypokalemia, resolved  - Replete per protocol      Diet: Regular  Fluids: PO  DVT Prophylaxis: Ambulate  Code Status: Full    Disposition:  Expected discharge: Tomorrow, recommended to prior living arrangement once stroke workup .      Patient staffed with attending physician, Dr. Langley.    Moriah Lindo MD  Internal Medicine, PGY-2  [facility] 4 Service  Pager: [phone]  [facility]   Please see sign in/sign out for up to date coverage information  _____________________________________________________________________    Interval History  Nursing notes reviewed. No acute events overnight.  Patient feels better this morning.  Anxious about everything that has been going on.  Numbness and tingling still present in his left hand.  Not having pain elsewhere.    Physical Exam  BP (!) 158/91   Pulse 85   Temp 98.4  F (36.9  C) (Oral)   Resp 24   Ht 1.803 m (5' 11\")   Wt 95.3 kg (210 lb)   SpO2 97%   BMI 29.29 kg/m    210 lbs 0 oz    Constitutional: awake, alert, cooperative, no acute distress  Respiratory: breathing comfortably on room air, CTAB, no crackles or wheezing  Cardiovascular: regular rate and rhythm, normal S1 and S2, no murmur noted  GI: soft, non-distended, non-tender, BS+  Skin: normal skin color, texture, turgor  Musculoskeletal: no lower extremity edema present  Neurologic: alert and oriented x3, CNs II-XII grossly intact, strength symmetric      Labs and Imaging  All labs and imaging reviewed.                "

## 2020-12-10 NOTE — CONSULTS
"  [facility]     Stroke Consult Note    Reason for Consult: Stroke Code    Chief Complaint: Hypertension and Numbness      HPI  Braulio Guardado is a 65 year old male with PMHx severe anxiety, PTSD, and infrequent doctor visits who was sent from clinic to the ED on 12/9/2020 for extreme HTN. The pt had been at an appointment at the [facility] for left-sided 4th/5th digit numbness that had been ongoing for he past 6mo, as well as separate intermittent R-sided neck pain that had been present for several weeks and was no worse today, long-term L pinkie numbness associated with fracture of the digit many months ago, and more recent intermittent difficulties with coordination in the past 2-3 weeks - specifically \"knowing which arm to use to put on a shirt\". However, at the start of the visit, vitals were taken and pt was noted to have -130's w/ both automatic and manual pressures. At the time, the pt endorsed anxiety and was diaphoretic. However, he denied any  HA, vision changes, hearing changes, CP, SOB, palpitations, arm/jaw numbness, other extremity numbness/weakness besides L ring/pinkie fingers, cough, rhinorrhea, recent illness, or sick contacts. An EKG was taken at the clinic, exhibiting sinus tachycardia w/o ST changes. He was subsequently transferred to [facility] ED for HTN Urgency    Upon arrival in the ED, pt /136. Work-up ensued for HTN Urgency with BMP, EKG, CXR, and HgbA1c (5.2) returning unremarkable and lipid panel notable for . Pt also underwent a CTH (12/9/20) which showed multifocal areas in the R parietal lobe, peripheral R parieto-occipital lobe, and L subcortical white matter concerning for subacute strokes. CTA Head/Neck (12/9/20) showed no intracranial aneurysms or stenoses of major vessels intracranial/cervical arteries. With the abnormal findings on CTH, elected to obtain MRI Brain which confirmed several areas of evolving " late subacute-chronic infarcts in the R frontoparietal junction and occipital lobe, a likely subacute infarct in the R centrum semiovale, and signs of chronic microvascular ischemic disease. He was loaded with ASA 325mg with plans to initiate ASA 81mg and Atorvastatin 40mg daily. Stroke Neurology was subsequently consulted for recommendations on further work-up.    As to his HTN, pt has so far required a total of 30mg Hydralazine and 10mg Labetalol to control BP. He has also completed a TTE (12/9/20) showing a hyperkinetic LV w/ EF 65-70% w/o valvular abnormalities and intact atrial septum. Pt is to be admitted to Medicine for further management    As to his past medical hx, the patient reports that he goes to the doctor very infrequently, but has never been told he had high blood pressure before. He recalls that the few times he's checked it, it may have been in the 160 range. However, he has never been placed on medications for this. He denies any other medical conditions besides severe anxiety and PTSD, saying the coming to the doctor makes him nervous. He is not currently on any medications besides various vitamin supplements (B12, Folate, Vit D, Zinc). He denies using any herbal supplements or recent drug use. 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 However, it does not appear that he has suffered long-term or severe deficits from any of them so far. The other issues he cited, namely the neck pain and L digit pain, have been present for a longer period of time and better correlate with musculoskeletal pain and an ulnar mononeuropathy, respectively. I believe these issues may be dealt with on an outpatient basis and he already has a repeat Neurology  clinic visit scheduled with Dr Gordillo in 2wks    As to his multifocal subacute/chronic strokes, the etiology is clouded at the moment, but is likely of an embolic nature given his clean CTA despite a highly elevated LDL and imaging showing signs of ischemia in a classic wedge-shape suggestive of embolism. However, there are also areas more suggestive of microvascular disease, which would match with his newly diagnosed HTN. These multifold risk factors should be optimized prior to discharge. I agree with the initiation of ASA 81mg daily and Atorvastatin 40mg daily and will defer to the primary team for blood pressure management. This will require close outpatient follow-up, for which the pt will require a referral to establish care with a PCP as he is currently without one. I would also recommend a SW consult as pt admits to having trouble with his insurance and worries he will be unable to afford his medications. I also believe a Psych consult inpatient or referral upon discharge would not be unwarranted as pt suffers from severe anxiety/PTSD and this is likely contributing to his HTN which places him at increased risk for future stroke. Apparently he had seen a therapist in the past, but this person had to stop her practice roughly 2yrs ago for personal reasons and he has seen no one in the interim. Beyond this, would recommend on completing the remainder of the stroke work-up, including completion of his Carotid Duplex as well as long-term cardiac monitoring for potential Afib upon discharge.    Recommendations:  - Continue ASA 81mg daily  - Continue Atorvastatin 40mg daily  - Will defer to primary team for blood pressure management  - Complete Carotid Duplex US  - Cardiac Monitor upon discharge for 14d  - PCP referral upon discharge  - Psych referral upon discharge  - Follow-up as scheduled with Dr Gordillo in the Neurology Clinic in 2wks    Thank you for this consult. We will continue to follow.     The patient  was discussed with Stroke Fellow, Dr. Leigha Bradford.  The Stroke Staff is Dr. Karlie Owens.    Elena Mejia MD  Neurology Resident, PGY 1  Pager: [phone]  ______________________________________________________    Past Medical History   No past medical history on file.  Past Surgical History   No past surgical history on file.  Medications   Home Meds  Prior to Admission medications    Not on File       Scheduled Meds    amLODIPine  5 mg Oral Daily     [START ON 12/11/2020] aspirin  81 mg Oral Daily     atorvastatin  40 mg Oral QPM     sodium chloride (PF)  10 mL Intravenous Once     sodium chloride (PF)  3 mL Intracatheter Q8H     sodium chloride bacteriostatic  10 mL Intravenous Once       Infusion Meds      PRN Meds  acetaminophen, labetalol, lidocaine 4%, lidocaine (buffered or not buffered), melatonin, sodium chloride (PF)    Allergies   No Known Allergies  Family History   No family history on file.  Social History   Social History     Tobacco Use     Smoking status: Not on file   Substance Use Topics     Alcohol use: Not on file     Drug use: Not on file       Review of Systems    ROS: 10 point ROS neg other than the symptoms noted above in the HPI.    PHYSICAL EXAMINATION  Temp:  [98.4  F (36.9  C)] 98.4  F (36.9  C)  Pulse:  [] 85  Resp:  [11-34] 24  BP: (130-216)/() 160/110  SpO2:  [94 %-100 %] 97 %     General:  patient lying in bed without any acute distress, anxious appearing    HEENT:  normocephalic/atraumatic  Cardio:  RRR  Pulmonary:  no respiratory distress  Abdomen:  soft, non-tender, non-distended  Extremities:  no edema  Skin:  intact, warm/dry     Neurologic  Mental Status:  alert, oriented x 3, follows commands, speech clear and fluent, naming and repetition normal  Cranial Nerves:  visual fields intact, PERRL, EOMI with normal smooth pursuit, facial sensation intact and symmetric, facial movements symmetric, hearing not formally tested but intact to conversation, palate  elevation symmetric and uvula midline, no dysarthria, shoulder shrug strong bilaterally, tongue protrusion midline  Motor:  normal muscle tone and bulk including in L ulnar distribution, no abnormal movements, able to move all limbs spontaneously, strength 5/5 throughout upper and lower extremities except for very subtle decreased strength in L 4/5th digits, no pronator drift  Reflexes:  toes down-going  Sensory:  light touch sensation intact and symmetric throughout upper and lower extremities except decreased by 50% in L 4/5th digits, no extinction on double simultaneous stimulation   Coordination:  normal FNF on R w/ slight dysmetria on L, normal HS in bilateral legs  Station/Gait:  deferred      Imaging  I personally reviewed all imaging; relevant findings per HPI.     Lab Results Data   CBC  Recent Labs   Lab 12/09/20  1709   WBC 8.8   RBC 5.49   HGB 16.5   HCT 48.4        Basic Metabolic Panel    Recent Labs   Lab 12/10/20  0720 12/09/20  1709    140   POTASSIUM 3.4 3.2*   CHLORIDE 107 107   CO2 25 24   BUN 12 14   CR 0.82 0.90   * 106*   ALVINA 9.3 9.2     Liver Panel  Recent Labs   Lab 12/09/20  1709   PROTTOTAL 7.5   ALBUMIN 3.9   BILITOTAL 0.6   ALKPHOS 101   AST 15   ALT 25     INR    Recent Labs   Lab Test 12/09/20  1709   INR 1.04      Lipid Profile    Recent Labs   Lab Test 12/10/20  0720   CHOL 291*   HDL 42   *   TRIG 89     A1C    Recent Labs   Lab Test 12/10/20  0720   A1C 5.2     Troponin I    Recent Labs   Lab 12/09/20  1709   TROPI <0.015

## 2020-12-10 NOTE — PLAN OF CARE
Neuro: A&Ox4. Ephraim checks intact. Pt c/o numbness to the last 2 fingers of L hand, this in unchanged   Cardiac: SR. BP elevated, Norvasc started. Prn labetalol if needed.    Respiratory: Sating > 95% on RA.  GI/: Adequate urine output.   Diet/appetite: Tolerating regular diet. Eating well.  Activity:  Independent, up to chair and in halls.  Pain: At acceptable level on current regimen.   Skin: No new deficits noted.  LDA's: PIV    Plan: Continue with POC. Notify primary team with changes.

## 2020-12-10 NOTE — H&P
[facility]     History and Physical - [facility] Night Service        Date of Admission:  12/9/2020    Assessment & Plan   Braulio Guardado is a 65 year old male with PMHx of depression and anxiety admitted for L sided symptoms in the setting of urgency hypertension and subsequently found to have new intracranial CT findings.    New intracranial lesions  L sided neuro symptoms   CT findings: multifocal ill-defined regions of patchy hypodensity at the gray-white matter junction in the anterior right parietal lobe and peripheral right parieto-occipital lobe with smaller hypodense focus in the left subcortical white matter. These findings would be atypical for cerebral infarct. Suspect malignancy (mets).   -Contrast enhanced MRI brain and cervical spine for further characterization and guide management    Hypertensive Urgency  Presented with SBP > 160 with no findings of end organ dysfunction. No AUGUSTUS, transaminitis, encephalopathy or visual disturbances. No prior diagnosis of HTN on chart or per patient  -Allow permissive HTN for the next 24 hours   -Parameters: continue IV hydralazine 10 mg for SBP > 160 and DBP > 90 mmhg    Hypokalemia  Replete  -Check phosphorus      Diet:  Regular  Fluids: None  DVT Prophylaxis: Enoxaparin (Lovenox) SQ  [place] Catheter: not present  Code Status:  Full         Disposition Plan   Expected discharge: 2 - 3 days, recommended to prior living arrangement once better characterization of CT findings and urgency hypertension controlled  Entered: Yevgeniy Seo MD 12/09/2020, 10:42 PM       The patient's care was discussed with the Attending Physician, Dr. Bowen.    Yevgeniy Seo MD  [facility]   Contact information available via [facility] Paging/Directory  Please see sign in/sign out for up to date coverage  information  ______________________________________________________________________    Chief Complaint   Left sided symptoms and hypertension    History is obtained from the patient and chart    History of Present Illness   Braulio Guardado is a 65 year old male with PMHx of depression and anxiety admitted for L sided symptoms and found to have urgency hypertension at his Neuro Clinic visit.    Patient has had L sided symptoms for over 5 months. L pinky and ring finger numbness and weakness. L foot weakness as well. No pain. Reports that the L hand symptoms have been gradually worsening - noticed dropping things with the left hand. Denies headaches, vision changes, hearing changes, Chest pain, SOB, palpitations, arm/jaw numbness.     Went to see Neurology on 12/9 for his symptoms but was found to having hypertensive urgency and sent to the ED. The last doctor's visit was about 3 years ago and his BP were in the 160's. At that time, he wasn't started on any antihypertensives. He is not currently on any medications besides various vitamin supplements (B12, Folate, Vit D, Zinc). He denies using any herbal supplements or recent drug use. However, he does endorse occasional marijuana use in the past.    Review of Systems    The 10 point Review of Systems is negative other than noted in the HPI or here.     Past Medical History    I have reviewed this patient's medical history and updated it with pertinent information if needed.   No past medical history on file.     Past Surgical History   I have reviewed this patient's surgical history and updated it with pertinent information if needed.  No past surgical history on file.      Social History   Nonsmoker and occasional alcohol drinker  -Workd in the Art field    Family History   No pertinent diseases that run in his family    Prior to Admission Medications   None     Allergies   No Known Allergies    Physical Exam   Vital Signs: Temp: 98.4  F (36.9  C) Temp src: Oral BP:  (!) 168/106 Pulse: 93   Resp: 11 SpO2: 95 % O2 Device: None (Room air)    Weight: 210 lbs 0 oz    General: Laying in bed in no acute distress  HEENT: Normocephalic. PERRL. No skin discolorations noted, no carotid bruit  Cardio: RR, tachycardic, without murmurs or bruits   Pulmonary: No increased work of breathing  Abdomen: Positive bowel sounds, soft, non-tender, no organomegaly  Neuro: cranial nerves: Pupils reactive to light. No peripheral visual field deficits. No nystagmus, facial sensation intact, facial expression normal, hearing intact, sternomastoid and trapezius function normal, tongue motions normal   Motor: Normal muscle tone. 5/5 muscle strength bilaterally including  strength and finger spread   Reflexes: Deferred  Sensory: Light touch sensation intact and symmetric in bilateral extremities except for L ring/pinkie finger which are ~50% decreased   Gait: Not tested    Data   Data reviewed today: I reviewed all medications, new labs and imaging results over the last 24 hours.

## 2020-12-11 ENCOUNTER — HOSPITAL ENCOUNTER (OUTPATIENT)
Facility: CLINIC | Age: 65
Setting detail: OBSERVATION
Discharge: HOME | End: 2020-12-13
Attending: FAMILY MEDICINE | Admitting: EMERGENCY MEDICINE
Payer: COMMERCIAL

## 2020-12-11 VITALS
RESPIRATION RATE: 18 BRPM | SYSTOLIC BLOOD PRESSURE: 173 MMHG | OXYGEN SATURATION: 96 % | HEART RATE: 80 BPM | TEMPERATURE: 98.4 F | WEIGHT: 198.85 LBS | HEIGHT: 71 IN | DIASTOLIC BLOOD PRESSURE: 108 MMHG | BODY MASS INDEX: 27.84 KG/M2

## 2020-12-11 DIAGNOSIS — I16.0 HYPERTENSIVE URGENCY: ICD-10-CM

## 2020-12-11 DIAGNOSIS — Z20.828 EXPOSURE TO SARS-ASSOCIATED CORONAVIRUS: ICD-10-CM

## 2020-12-11 DIAGNOSIS — E87.6 HYPOPOTASSEMIA: ICD-10-CM

## 2020-12-11 DIAGNOSIS — F41.9 ANXIETY: ICD-10-CM

## 2020-12-11 DIAGNOSIS — I10 ESSENTIAL HYPERTENSION: ICD-10-CM

## 2020-12-11 LAB
ALBUMIN SERPL-MCNC: 3.9 G/DL (ref 3.4–5)
ALP SERPL-CCNC: 102 U/L (ref 40–150)
ALT SERPL W P-5'-P-CCNC: 34 U/L (ref 0–70)
ANION GAP SERPL CALCULATED.3IONS-SCNC: 6 MMOL/L (ref 3–14)
ANION GAP SERPL CALCULATED.3IONS-SCNC: 7 MMOL/L (ref 3–14)
AST SERPL W P-5'-P-CCNC: 20 U/L (ref 0–45)
BASOPHILS # BLD AUTO: 0.1 10E9/L (ref 0–0.2)
BASOPHILS NFR BLD AUTO: 0.6 %
BILIRUB SERPL-MCNC: 0.7 MG/DL (ref 0.2–1.3)
BUN SERPL-MCNC: 17 MG/DL (ref 7–30)
BUN SERPL-MCNC: 19 MG/DL (ref 7–30)
CALCIUM SERPL-MCNC: 9.2 MG/DL (ref 8.5–10.1)
CALCIUM SERPL-MCNC: 9.2 MG/DL (ref 8.5–10.1)
CHLORIDE SERPL-SCNC: 108 MMOL/L (ref 94–109)
CHLORIDE SERPL-SCNC: 108 MMOL/L (ref 94–109)
CO2 SERPL-SCNC: 25 MMOL/L (ref 20–32)
CO2 SERPL-SCNC: 28 MMOL/L (ref 20–32)
CREAT SERPL-MCNC: 0.9 MG/DL (ref 0.66–1.25)
CREAT SERPL-MCNC: 1.04 MG/DL (ref 0.66–1.25)
DIFFERENTIAL METHOD BLD: NORMAL
EOSINOPHIL # BLD AUTO: 0.1 10E9/L (ref 0–0.7)
EOSINOPHIL NFR BLD AUTO: 0.8 %
ERYTHROCYTE [DISTWIDTH] IN BLOOD BY AUTOMATED COUNT: 12.3 % (ref 10–15)
ERYTHROCYTE [DISTWIDTH] IN BLOOD BY AUTOMATED COUNT: 12.4 % (ref 10–15)
GFR SERPL CREATININE-BSD FRML MDRD: 76 ML/MIN/{1.73_M2}
GFR SERPL CREATININE-BSD FRML MDRD: >90 ML/MIN/{1.73_M2}
GLUCOSE SERPL-MCNC: 119 MG/DL (ref 70–99)
GLUCOSE SERPL-MCNC: 99 MG/DL (ref 70–99)
HCT VFR BLD AUTO: 47.9 % (ref 40–53)
HCT VFR BLD AUTO: 48.4 % (ref 40–53)
HGB BLD-MCNC: 15.6 G/DL (ref 13.3–17.7)
HGB BLD-MCNC: 16.5 G/DL (ref 13.3–17.7)
IMM GRANULOCYTES # BLD: 0 10E9/L (ref 0–0.4)
IMM GRANULOCYTES NFR BLD: 0.2 %
LYMPHOCYTES # BLD AUTO: 1.7 10E9/L (ref 0.8–5.3)
LYMPHOCYTES NFR BLD AUTO: 20.8 %
MCH RBC QN AUTO: 29.2 PG (ref 26.5–33)
MCH RBC QN AUTO: 30 PG (ref 26.5–33)
MCHC RBC AUTO-ENTMCNC: 32.6 G/DL (ref 31.5–36.5)
MCHC RBC AUTO-ENTMCNC: 34.1 G/DL (ref 31.5–36.5)
MCV RBC AUTO: 88 FL (ref 78–100)
MCV RBC AUTO: 90 FL (ref 78–100)
MONOCYTES # BLD AUTO: 0.8 10E9/L (ref 0–1.3)
MONOCYTES NFR BLD AUTO: 9.2 %
NEUTROPHILS # BLD AUTO: 5.7 10E9/L (ref 1.6–8.3)
NEUTROPHILS NFR BLD AUTO: 68.4 %
NRBC # BLD AUTO: 0 10*3/UL
NRBC BLD AUTO-RTO: 0 /100
PLATELET # BLD AUTO: 243 10E9/L (ref 150–450)
PLATELET # BLD AUTO: 263 10E9/L (ref 150–450)
POTASSIUM SERPL-SCNC: 3.3 MMOL/L (ref 3.4–5.3)
POTASSIUM SERPL-SCNC: 3.7 MMOL/L (ref 3.4–5.3)
PROT SERPL-MCNC: 7.5 G/DL (ref 6.8–8.8)
RBC # BLD AUTO: 5.35 10E12/L (ref 4.4–5.9)
RBC # BLD AUTO: 5.5 10E12/L (ref 4.4–5.9)
SODIUM SERPL-SCNC: 141 MMOL/L (ref 133–144)
SODIUM SERPL-SCNC: 142 MMOL/L (ref 133–144)
WBC # BLD AUTO: 7.6 10E9/L (ref 4–11)
WBC # BLD AUTO: 8.3 10E9/L (ref 4–11)

## 2020-12-11 PROCEDURE — 36415 COLL VENOUS BLD VENIPUNCTURE: CPT | Performed by: STUDENT IN AN ORGANIZED HEALTH CARE EDUCATION/TRAINING PROGRAM

## 2020-12-11 PROCEDURE — 99232 SBSQ HOSP IP/OBS MODERATE 35: CPT | Mod: GC | Performed by: PSYCHIATRY & NEUROLOGY

## 2020-12-11 PROCEDURE — 999N000111 HC STATISTIC OT IP EVAL DEFER: Performed by: OCCUPATIONAL THERAPIST

## 2020-12-11 PROCEDURE — 85025 COMPLETE CBC W/AUTO DIFF WBC: CPT | Performed by: FAMILY MEDICINE

## 2020-12-11 PROCEDURE — 80048 BASIC METABOLIC PNL TOTAL CA: CPT | Performed by: STUDENT IN AN ORGANIZED HEALTH CARE EDUCATION/TRAINING PROGRAM

## 2020-12-11 PROCEDURE — 250N000013 HC RX MED GY IP 250 OP 250 PS 637: Performed by: STUDENT IN AN ORGANIZED HEALTH CARE EDUCATION/TRAINING PROGRAM

## 2020-12-11 PROCEDURE — 84484 ASSAY OF TROPONIN QUANT: CPT | Performed by: FAMILY MEDICINE

## 2020-12-11 PROCEDURE — 85027 COMPLETE CBC AUTOMATED: CPT | Performed by: STUDENT IN AN ORGANIZED HEALTH CARE EDUCATION/TRAINING PROGRAM

## 2020-12-11 PROCEDURE — 99285 EMERGENCY DEPT VISIT HI MDM: CPT | Mod: 25 | Performed by: FAMILY MEDICINE

## 2020-12-11 PROCEDURE — 93010 ELECTROCARDIOGRAM REPORT: CPT | Performed by: FAMILY MEDICINE

## 2020-12-11 PROCEDURE — C9803 HOPD COVID-19 SPEC COLLECT: HCPCS | Performed by: FAMILY MEDICINE

## 2020-12-11 PROCEDURE — 83735 ASSAY OF MAGNESIUM: CPT | Performed by: FAMILY MEDICINE

## 2020-12-11 PROCEDURE — 93005 ELECTROCARDIOGRAM TRACING: CPT | Performed by: FAMILY MEDICINE

## 2020-12-11 PROCEDURE — 99207 PR CDG-CODE INCORRECT PER BILLING BASED ON TIME: CPT | Performed by: INTERNAL MEDICINE

## 2020-12-11 PROCEDURE — 99238 HOSP IP/OBS DSCHRG MGMT 30/<: CPT | Performed by: INTERNAL MEDICINE

## 2020-12-11 PROCEDURE — 80053 COMPREHEN METABOLIC PANEL: CPT | Performed by: FAMILY MEDICINE

## 2020-12-11 RX ORDER — CLOPIDOGREL BISULFATE 75 MG/1
75 TABLET ORAL DAILY
Status: DISCONTINUED | OUTPATIENT
Start: 2020-12-11 | End: 2020-12-11 | Stop reason: HOSPADM

## 2020-12-11 RX ORDER — AMLODIPINE BESYLATE 5 MG/1
5 TABLET ORAL DAILY
Qty: 30 TABLET | Refills: 1 | Status: ON HOLD | OUTPATIENT
Start: 2020-12-12 | End: 2020-12-13

## 2020-12-11 RX ORDER — ASPIRIN 81 MG/1
81 TABLET, CHEWABLE ORAL DAILY
Qty: 30 TABLET | Refills: 1 | Status: SHIPPED | OUTPATIENT
Start: 2020-12-12

## 2020-12-11 RX ORDER — CLOPIDOGREL BISULFATE 75 MG/1
75 TABLET ORAL DAILY
Qty: 90 TABLET | Refills: 0 | Status: SHIPPED | OUTPATIENT
Start: 2020-12-11 | End: 2022-09-19

## 2020-12-11 RX ORDER — LIDOCAINE 40 MG/G
CREAM TOPICAL
Status: DISCONTINUED | OUTPATIENT
Start: 2020-12-11 | End: 2020-12-13 | Stop reason: HOSPADM

## 2020-12-11 RX ORDER — ATORVASTATIN CALCIUM 40 MG/1
40 TABLET, FILM COATED ORAL EVERY EVENING
Qty: 30 TABLET | Refills: 1 | Status: SHIPPED | OUTPATIENT
Start: 2020-12-11 | End: 2022-09-19

## 2020-12-11 RX ADMIN — CLOPIDOGREL BISULFATE 75 MG: 75 TABLET ORAL at 11:15

## 2020-12-11 RX ADMIN — ASPIRIN 81 MG CHEWABLE TABLET 81 MG: 81 TABLET CHEWABLE at 08:28

## 2020-12-11 RX ADMIN — AMLODIPINE BESYLATE 5 MG: 5 TABLET ORAL at 08:28

## 2020-12-11 SDOH — HEALTH STABILITY: MENTAL HEALTH: HOW OFTEN DO YOU HAVE A DRINK CONTAINING ALCOHOL?: NEVER

## 2020-12-11 ASSESSMENT — ENCOUNTER SYMPTOMS
VOMITING: 0
WEAKNESS: 0
NERVOUS/ANXIOUS: 1
LIGHT-HEADEDNESS: 0
COUGH: 0
SHORTNESS OF BREATH: 0
FATIGUE: 0
SEIZURES: 0
HEADACHES: 0
NUMBNESS: 1
COLOR CHANGE: 0
NECK STIFFNESS: 0
NAUSEA: 0
FEVER: 0

## 2020-12-11 ASSESSMENT — ACTIVITIES OF DAILY LIVING (ADL)
ADLS_ACUITY_SCORE: 12

## 2020-12-11 ASSESSMENT — MIFFLIN-ST. JEOR
SCORE: 1719.13
SCORE: 1724.32

## 2020-12-11 NOTE — PROGRESS NOTES
[facility]     Stroke Consult Note    Reason for Consult:  Past strokes seen on MRI    Chief Complaint: Hypertension and Numbness     Interval Events  - No acute events overnight  - Primary team initiating BP management w/ Norvasc 5mg QD  - Completed Bilateral Carotid Duplex: unremarkable  - Completed TTE: LV size normal w/ hyperkinetic function and EF 65-70% w/o significant valvular abnormalities. Intact atrial septum. 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 Considering this, further investigation with prolonged cardiac monitoring is warranted, as is initiation of aspirin and statin therapies. Will also add on Plavix 75mg QD to provide DAPT for 90d followed by monotherapy ASA 81mg QD. On reconsideration of the pt's intracranial imaging, the distribution of the pt's strokes more closely corresponds to the R MCA watershed areas and there does appear to be some more significant intracranial atherosclerotic disease       Recommendations:  - ASA 81mg daily + Plavix 75mg daily for 90d followed by monotherapy w/ ASA 81mg daily  - Atorvastatin 40mg daily  - Will defer to primary team for blood pressure management  - Zio Patch 14d cardiac monitoring upon discharge  - Follow-up with Dr Gordillo in Neurology clinic as previously scheduled in 2 weeks  - PCP referral upon discharge  - Psychology referral upon discharge    Thank you for this consult. No further stroke evaluation is recommended, so we will sign off. Please contact us with any additional questions.    The patient was discussed with Stroke Fellow, Dr. Leigha Bradford.  The Stroke Staff is Dr. Rio James.    Elena Mejia MD  Neurology  Resident, PGY1  Pager: [phone]  _____________________________________________________    Past Medical History   No past medical history on file.  Past Surgical History   No past surgical history on file.  Medications   Home Meds  Prior to Admission medications    Not on File       Scheduled Meds    amLODIPine  5 mg Oral Daily     aspirin  81 mg Oral Daily     atorvastatin  40 mg Oral QPM     clopidogrel  75 mg Oral Daily     sodium chloride (PF)  10 mL Intravenous Once     sodium chloride (PF)  3 mL Intracatheter Q8H     sodium chloride bacteriostatic  10 mL Intravenous Once       Infusion Meds      PRN Meds  acetaminophen, labetalol, lidocaine 4%, lidocaine (buffered or not buffered), melatonin, sodium chloride (PF)    Allergies   No Known Allergies  Family History   No family history on file.  Social History   Social History     Tobacco Use     Smoking status: Not on file   Substance Use Topics     Alcohol use: Not on file     Drug use: Not on file       Review of Systems   The 10 point Review of Systems is negative other than noted in the HPI or here.        PHYSICAL EXAMINATION   Temp:  [97.8  F (36.6  C)-98.5  F (36.9  C)] 98.2  F (36.8  C)  Pulse:  [59-85] 81  Resp:  [16-20] 18  BP: (130-168)/() 168/110  SpO2:  [94 %-100 %] 100 %    General:  patient lying in bed without any acute distress    HEENT:  normocephalic/atraumatic  Cardio:  RRR  Pulmonary:  no respiratory distress  Abdomen:  soft, non-tender, non-distended  Extremities:  no edema  Skin:  intact, warm/dry     Neurologic  Mental Status:  alert, oriented x 3, follows commands, speech clear and fluent, naming and repetition normal  Cranial Nerves:  visual fields intact, PERRL, EOMI with normal smooth pursuit, facial sensation intact and symmetric, facial movements symmetric, hearing not formally tested but intact to conversation, palate elevation symmetric and uvula midline, no dysarthria, shoulder shrug strong bilaterally, tongue protrusion  midline  Motor:  normal muscle tone and bulk including in L side ulnar distribution, no abnormal movements, able to move all limbs spontaneously, strength 5/5 throughout upper and lower extremities w/ very subtle weakness to L 4/5th digits on , no pronator drift  Reflexes:  toes down-going  Sensory:  no extinction on double simultaneous stimulation , intact to light touch throughout except for decreased sensation to L 4/5th digits  Coordination:  normal FNF  Station/Gait:  deferred    Imaging  I personally reviewed all imaging; relevant findings per HPI.    Labs Data   CBC  Recent Labs   Lab 12/11/20 0435 12/09/20  1709   WBC 7.6 8.8   RBC 5.35 5.49   HGB 15.6 16.5   HCT 47.9 48.4    271     Basic Metabolic Panel   Recent Labs   Lab 12/11/20  0435 12/10/20  0720 12/09/20  1709    139 140   POTASSIUM 3.7 3.4 3.2*   CHLORIDE 108 107 107   CO2 28 25 24   BUN 17 12 14   CR 1.04 0.82 0.90   GLC 99 128* 106*   ALVINA 9.2 9.3 9.2     Liver Panel  Recent Labs   Lab 12/09/20  1709   PROTTOTAL 7.5   ALBUMIN 3.9   BILITOTAL 0.6   ALKPHOS 101   AST 15   ALT 25     INR    Recent Labs   Lab Test 12/09/20  1709   INR 1.04      Lipid Profile    Recent Labs   Lab Test 12/10/20  0720   CHOL 291*   HDL 42   *   TRIG 89     A1C    Recent Labs   Lab Test 12/10/20  0720   A1C 5.2     Troponin I    Recent Labs   Lab 12/09/20  1709   TROPI <0.015

## 2020-12-11 NOTE — PLAN OF CARE
DISCHARGE                         12/11/2020  1:25 PM  ----------------------------------------------------------------------------  Discharged to: Home  Via: private transportation  Accompanied by: Self  Discharge Instructions: Resume regular diet, activity as tolerated, medications, follow up appointments, when to call the MD, aftercare instructions.  Prescriptions: To be filled by discharge pharmacy; medication list reviewed & sent with pt  Follow Up Appointments: arranged; information given  Belongings: All sent with pt  IV: d/c'd  Telemetry: d/c'd  Pt exhibits understanding of above discharge instructions; all questions answered.    Discharge Paperwork: Signed, copied, and sent home with patient.      Per discussion with Dr. Langley, patient ok to discharge home after /108. Pt to monitory BP at home and instructed to return to the ED for SBP>180 and DBP>110. Pt plans to purchase BP monitor from [employer] near his home later this evening or early tomorrow. Pt ambulated to front door and to [facility] for his vehicle at 1315.

## 2020-12-11 NOTE — DISCHARGE SUMMARY
[facility]   Discharge Summary - Medicine & Pediatrics       Date of Admission:  12/9/2020   Date of Discharge:  12/11/2020  1:25 PM  Discharging Provider: Ana Langley MD   Discharge Service: [place] [facility]    Discharge Diagnoses   Subacute-chronic CVAs  Hypertensive urgency  Left-sided ring and pinky finger numbness  Hypokalemia    Follow-ups Needed After Discharge   Establish with primary care  Establish with trauma psychologist  Follow up with Neurology     Unresulted Labs Ordered in the Past 30 Days of this Admission     No orders found from 11/9/2020 to 12/10/2020.      These results will be followed up by me.    Discharge Disposition   Discharged to home  Condition at discharge: Stable    Hospital Course   Braulio Guardado was admitted on 12/9/2020 for hypertensive urgency and head imaging concerning for stroke.  The following problems were addressed during her hospitalization:    Multiple subacute-chronic CVAs  Seen on CT head and better characterized on MRI.  Multiple areas of infarction of various ages.  Neurologic exam largely normal other than some mild numbness and tingling in the left fingers, and per Neurology this may be related to focal ulnar nerve issues.  LDL elevated, A1c normal, TTE unremarkable, carotid artery ultrasound normal.  Loaded with aspirin and then started on high intensity statin and daily low-dose aspirin and daily plavix.  Neurology consulted in the hospital, patient should follow-up at his already scheduled clinic visit on 12/21.  Blood pressure control will be critical (see below).  He is to complete a 90 day course of plavix.  He will also complete a 14 day Ziopatch monitor to rule out arrhythmias as a potential cause.    Hypertensive urgency  Blood pressures in the 200s/140s on presentation.  Treated with both IV hydralazine and IV labetalol, allowed to have permissive hypertension for 24 hours.  Amlodipine started in the  "hospital.  On discharge patient should continue to take amlodipine 5mg.  Discussed the importance of follow-up with primary care provider, will need to establish within 1 to 2 weeks of discharge.  He will need to have regular blood pressure checks and titration of blood pressure medications in clinic.  Recommended getting home blood pressure cuff.    Left-sided ring and pinky finger numbness  Not consistent with stroke syndromes related to lesion seen on head imaging.  More likely to be peripheral neuropathy.  Patient will follow up with neurology as planned.    Hypokalemia  Repleted per protocol.    History of PTSD and anxiety  Has a history of PTSD relating to his time in  service.  He reports having somatization concerns from this and responded well to therapy in the past.  Would benefit from follow-up with a trauma psychologist.  Set up referral on discharge.    Consultations This Hospital Stay   NEUROLOGY GENERAL ADULT IP CONSULT  PHYSICAL THERAPY ADULT IP CONSULT  OCCUPATIONAL THERAPY ADULT IP CONSULT  SPEECH LANGUAGE PATH ADULT IP CONSULT  PHYSICAL THERAPY ADULT IP CONSULT  OCCUPATIONAL THERAPY ADULT IP CONSULT    Code Status   Full Code       Ana Langley MD  Internal Medicine Hospitalist  [phone]   ______________________________________________________________________    Physical Exam   Vital Signs: BP (!) 166/118 (BP Location: Right arm)   Pulse 70   Temp 97.8  F (36.6  C) (Oral)   Resp 20   Ht 1.803 m (5' 11\")   Wt 91.3 kg (201 lb 4.5 oz)   SpO2 96%   BMI 28.07 kg/m    Weight: 201 lbs 4.48 oz  Gen:  Sitting in bed in NAD  HEENT:  EOMI, MMM  CV:  RRR, no m/r/g, peripheral pulses intact  Pulm:  CTAB, breathing comfortably on room air  GI:  NABS, soft, NT, ND  Neuro:  Alert, appropriately interactive, moving all limbs equally      Primary Care Physician   Physician No Ref-Primary    Discharge Orders      Physical Therapy Referral      Occupational Therapy Referral      Reason for your " hospital stay    You were hospitalized for dangerously high blood pressure.  While hospitalized, we learned that you have small strokes that have occurred in the past, which might account for the movement difficulties you have noticed.  Your blood pressure came down to a safe level with medications.  In order prevent future strokes and issues with blood pressure, you will need to be on several medications and follow up with a primary provider and with Neurology.     Adult [facility] Follow-up and recommended labs and tests    Follow up with primary care provider, Physician No Ref-Primary, within 7 days to evaluate medication change and for hospital follow- up.  No follow up labs or test are needed.  Patient would prefer to establish care with a holistic provider or nurse practitioner at the [facility].    Patient would also like to establish care with a trauma psychologist within the [facility] who specializes in trauma centered care and has expertise in somatization.      Please follow up with Neurology as previously scheduled.    Appointments on [facility] and/or [facility] (with [facility] or [facility] provider or service). Call [phone] if you haven't heard regarding these appointments within 7 days of discharge.     Activity    Your activity upon discharge: activity as tolerated     Discharge Instructions    Please check your blood pressure every morning.  Try to sit still for 5 minutes and relax before checking your blood pressure.  Eventually a good goal for your blood pressure is 120/80s.  We started you on amlodipine here in the hospital and it can take a few days to fully be active.  Please record your pressures and bring that log to your visit with a primary care provider so you can best discuss how to manage your blood pressure.    You will start 4 medicines:  Aspirin, atorvastatin, amlodipine, and plavix.  You will take plavix for 90 days and then stop.      We would like you to get a Ziopatch to  monitor your heart rate and rhythm for 14 days, this is to rule out any heart arrhthymias being the case of your strokes.     Full Code     Zio Patch Holter Adult Pediatric Greater than 48 hrs     Blood pressure monitor     Diet    Follow this diet upon discharge: Orders Placed This Encounter      Combination Diet Regular Diet Adult       Significant Results and Procedures   Most Recent 3 CBC's:  Recent Labs   Lab Test 12/11/20  0435 12/09/20  1709   WBC 7.6 8.8   HGB 15.6 16.5   MCV 90 88    271     Most Recent 3 BMP's:  Recent Labs   Lab Test 12/11/20  0435 12/10/20  0720 12/09/20  1709    139 140   POTASSIUM 3.7 3.4 3.2*   CHLORIDE 108 107 107   CO2 28 25 24   BUN 17 12 14   CR 1.04 0.82 0.90   ANIONGAP 6 7 9   ALVINA 9.2 9.3 9.2   GLC 99 128* 106*     Most Recent Cholesterol Panel:  Recent Labs   Lab Test 12/10/20  0720   CHOL 291*   *   HDL 42   TRIG 89     Most Recent Hemoglobin A1c:  Recent Labs   Lab Test 12/10/20  0720   A1C 5.2   ,   Results for orders placed or performed during the hospital encounter of 12/09/20   Chest XR,  PA & LAT    Narrative    EXAM: XR CHEST 2 VW  LOCATION: [facility]  DATE/TIME: 12/9/2020 7:52 PM    INDICATION: Hypertension, fatigue  COMPARISON: None.      Impression    IMPRESSION: Negative chest.   Head CT w/o contrast     Value    Radiologist flags As above (Urgent)    Narrative    CT HEAD W/O CONTRAST 12/9/2020 8:14 PM    History: LUE numbness, HTN   ICD-10:    Comparison: Same-day CTA of the head    Technique: Using multidetector thin collimation helical acquisition  technique, axial, coronal and sagittal CT images from the skull base  to the vertex were obtained without intravenous contrast.    Findings: There is no intracranial hemorrhage, mass effect, or midline  shift. Multifocal ill-defined regions of hypodensity at the gray-white  matter junctions, for example hypodensity in the gray-white matter  junction of the anterior right parietal  lobe, ill-defined patchy  hypodensity in the peripheral right parieto-occipital gray-white  matter junction and smaller hypodense focus in the left subcortical  white matter. Ventricles are proportionate to the cerebral sulci. The  basal cisterns are clear.    The bony calvaria and the bones of the skull base are normal. The  visualized portions of the paranasal sinuses and mastoid air cells are  clear.       Impression    Impression:  Multifocal ill-defined regions of patchy hypodensity at the gray-white  matter junction in the anterior right parietal lobe and peripheral  right parieto-occipital lobe. Smaller hypodense focus in the left  subcortical white matter. These findings would be atypical for  cerebral infarct. Recommend contrast enhanced MRI for further  characterization.    [Urgent Result: As above]    Finding was identified on 12/9/2020 8:17 PM.     Dr. Chowdhury was contacted by Dr. Craven at 12/9/2020 8:33 PM and  verbalized understanding of the urgent finding.     I have personally reviewed the examination and initial interpretation  and I agree with the findings.    JS BRADSHAW MD   CTA Head Neck with Contrast    Narrative    CTA  HEAD NECK WITH CONTRAST 12/9/2020 8:25 PM    CT angiogram of the neck   CT angiogram of the base of the brain with contrast  Reconstruction by the Radiologist on the 3D workstation    Provided History:  HTN, neck pain, 6 months LUE numbness  ICD-10:    Comparison:  Same-day head CT.      Technique:  HEAD and NECK CTA: During rapid bolus intravenous injection of  nonionic contrast material, axial images were obtained using thin  collimation multidetector helical technique from the base of the upper  aortic arch through the [place]. This CT angiogram data was  reconstructed at thin intervals with mild overlap. Images were sent to  the [employer] workstation, and 3D reconstructions were obtained. The  axial source images, multiplanar reformations, 3D reconstructions  in  both maximum intensity projection display and volume rendered models  were reviewed, with reconstructions performed by the technologist and  the radiologist.    Contrast: iopamidol (ISOVUE-370) solution 75 mL    Findings:    Head CTA demonstrates no aneurysm or stenosis of the major  intracranial arteries. Anterior communicating artery is patent. Left  posterior communicating artery is not well-visualized. Right posterior  commuting artery is patent.    Neck CTA demonstrates no stenosis of the major cervical arteries. The  origins of the great vessels from the aortic arch are patent. The  normal distal right internal carotid artery measures 5 mm. The normal  distal left internal carotid artery measures 5 mm.     No mass within the visualized portions of the cervical soft tissues or  lung apices.       Impression    Impression:    1. Head CTA demonstrates no aneurysm or stenosis of the major  intracranial arteries.   2. Neck CTA demonstrates no stenosis of the major cervical arteries.    I have personally reviewed the examination and initial interpretation  and I agree with the findings.    JS BRADSHAW MD   MR Brain w/o & w Contrast    Narrative    EXAM: MR BRAIN W/O and W CONTRAST  LOCATION: [facility]  DATE/TIME: 12/10/2020 5:41 AM    INDICATION: Left upper extremity numbness and hypertension.    COMPARISON: Head CT dated 12/9/2020.    CONTRAST: 9.5 mL Gadavist.    TECHNIQUE: Routine multiplanar multisequence head MRI without and with intravenous contrast.    FINDINGS:  INTRACRANIAL CONTENTS: Focal diffusion hyperintensity in the right centrum semiovale without a definite ADC correlate and with mild FLAIR hyperintensity. Findings are favored to reflect a late subacute infarct. There are additional foci of diffusion   hyperintensity, FLAIR hyperintensity, susceptibility staining, and T1 hyperintensity in the right precentral gyrus, parietal lobe and occipital lobe with cortical enhancement.  Appearance is most suspicious for an evolving late subacute to chronic infarct   with laminar necrosis, however given the presence of enhancement follow-up to resolution is advised. No adverse mass effect or extra-axial fluid collection. Scattered nonspecific T2/FLAIR hyperintensities within the cerebral white matter most consistent   with mild chronic microvascular ischemic change. Mild generalized cerebral atrophy. No hydrocephalus. Normal position of the cerebellar tonsils.    SELLA: No abnormality accounting for technique.    OSSEOUS STRUCTURES/SOFT TISSUES: Normal marrow signal. The major intracranial vascular flow voids are maintained.     ORBITS: No abnormality accounting for technique.     SINUSES/MASTOIDS: Mucosal thickening primarily involving the ethmoid air cells. No middle ear or mastoid effusion.       Impression    IMPRESSION:  1.  Suspect multifocal evolving late subacute to chronic infarcts in the right frontoparietal junction and occipital lobe with laminar necrosis. Given the presence of enhancement, recommend follow-up in six to eight weeks to ensure resolution and   expected evolution and exclude underlying mass lesion.    2.  Likely subacute infarct in the right centrum semiovale, measuring 7 mm in diameter. There is no associated hemorrhage or enhancement, and the infarct is likely more recent than the other areas of infarction described above.    3.  Sequelae of chronic microvascular ischemic disease and generalized parenchymal volume loss.    Findings were discussed with Dr. Fernández at 7:10 AM on 12/10/2020.   MR Cervical Spine w/o & w Contrast    Narrative    EXAM: MR CERVICAL SPINE W/O and W CONTRAST  LOCATION: [facility]  DATE/TIME: 12/10/2020 5:42 AM    INDICATION: Neck pain.  COMPARISON: CTA head and neck dated 12/09/2020.  CONTRAST: 9.5ml Gadavist  TECHNIQUE: MRI Cervical Spine without and with IV contrast.    FINDINGS:   There is straightening of the cervical spine  curvature. Multilevel degenerative endplate changes with fatty marrow discogenic endplate changes. No abnormal bone marrow edema or suspicious osseous lesions. Vertebral body heights are preserved. There is   diffuse degenerative disc height loss throughout the cervical spine. No abnormal cord signal. No extraspinal abnormality.    Craniovertebral junction and C1-C2: Degenerative changes of the dens with narrowing of the atlantodental interval. No narrowing of spinal canal.    C2-C3: Small disc osteophyte complex with bilateral uncovertebral hypertrophy and facet arthropathy. No narrowing of spinal canal or neural foramen.     C3-C4: Disc osteophyte complex with uncovertebral hypertrophy and facet arthropathy. Mild effacement of the ventral thecal sac. Moderate bilateral neural foraminal narrowing.     C4-C5: Disc osteophyte complex with uncovertebral hypertrophy and facet arthropathy. Mild effacement of the ventral thecal sac. No narrowing of the neural foramen.     C5-C6: Disc osteophyte complex with uncovertebral hypertrophy and facet arthropathy. Moderate bilateral neural foraminal narrowing. Mild effacement of the ventral thecal sac.     C6-C7: Disc osteophyte complex with right greater than left uncovertebral hypertrophy and facet arthropathy. Severe right and moderate left neural foraminal narrowing. Mild effacement of the ventral thecal sac.     C7-T1: Disc osteophyte complex with a superimposed central disc extrusion that extends approximately 4 mm inferiorly from the intervertebral disc space. Bilateral facet arthropathy. Mild spinal canal stenosis. No neural foraminal narrowing.      Impression    IMPRESSION:  1.  Multilevel degenerative changes of the cervical spine, without high-grade spinal canal stenosis.  2.  Multilevel high-grade bilateral neural foraminal narrowing, which may impinge upon the exiting nerve roots.     US Carotid Bilateral    Narrative    Exam: Bilateral carotid duplex Doppler  ultrasound dated 12/10/2020  10:36 AM    Clinical history: cva    Comparison Study: No direct comparisons are available    Ordering provider: KHAI CASTELAN    Technique: Grayscale (B-mode) and duplex and spectral Doppler  ultrasound of the extracranial internal carotid, external carotid,  vertebral artery origins, right brachiocephalic/subclavian and left  subclavian arteries. Velocity measurements obtained with angle  correction at or less than 60 degrees.    Findings:    Right side:     Plaque Morphology: No appreciable plaque is identified.        Proximal CCA: 86/9 cm/sec     Mid CCA: 89/22 cm/sec     Distal CCA: 74/17 cm/sec     External CA: 188/23 cm/sec       Proximal ICA: 68/15 cm/sec     Mid ICA: 37/12 cm/sec     Distal ICA: 40/13 cm/sec       Vertebral artery: 42/15 cm/sec, antegrade         ICA/CCA ratio: 0.91    Left side:     Plaque Morphology: No appreciable plaque is identified.        Proximal CCA: 108/24 cm/sec     Mid CCA: 93.7/23 cm/sec     Distal CCA: 86/20 cm/sec     External CA: 109/20 cm/sec       Proximal ICA: 77/26 cm/sec     Mid ICA: 88/33 cm/sec     Distal ICA: 76/27 cm/sec       Vertebral artery: 62 cm/sec, antegrade flow     ICA/CCA ratio: 1.03      Impression    Impression:    1. Right side: Normal   2. Left side: Normal    Consensus Panel Gray-Scale and Doppler US Criteria for Diagnosis of  ICA Stenosis (Radiology 11/2003) additionally modified by Jossie et  al. in Journal of Vascular Surgery 1/2011, (03)86-18.       Normal         ICA PSV < 140 cm/sec       Plaque Estimate None       ICA/CCA  PSV Ratio < 2.0       ICA EDV < 40 cm/sec       < 50%          ICA PSV < 140 cm/sec       Plaque Estimate < 50%       ICA/CCA  PSV Ratio < 2.0       ICA EDV < 40 cm/sec       50- 69%       ICA -230 cm/sec       Plaque Estimate > or = 50%       ICA/CCA PSV Ratio 2.0-4.0       ICA EDV  cm/sec         > or = 70%, less than near occlusion       ICA PSV > 230 cm/sec       Plaque  Estimate > or = 50%       ICA/CCA Ratio > 4.0       ICA EDV > 100 cm/sec                                            Additional criteria from vascular surgery     > 80%       EDV > 120 cm/sec     I have personally reviewed the examination and initial interpretation  and I agree with the findings.    KHAI MYERS MD   Echocardiogram Complete    Narrative    [id number]  [id number]  [id number]  [id number]^ANNELISE^KHAI           [facility]  Echocardiography Laboratory  [address] [place] [address]     Name: RADHA BEE  MRN: [id number]  : 1955  Study Date: 12/10/2020 09:39 AM  Age: 65 yrs  Gender: Male  Patient Location: [facility]  Reason For Study: CVA  Ordering Physician: KHAI CASTELAN  Performed By: Gerri Chahal RDCS     BSA: 2.2 m2  Height: 71 in  Weight: 210 lb  HR: 100  BP: 162/100 mmHg  _____________________________________________________________________________  __        Procedure  Bubble Echocardiogram with two-dimensional, color and spectral Doppler  performed. Technically difficult study.  _____________________________________________________________________________  __        Interpretation Summary  Technically difficult study.  Right ventricular function, chamber size, wall motion, and thickness are  normal.  Global and regional left ventricular function is hyperkinetic with an EF of  65-70%.  IVC diameter <2.1 cm collapsing >50% with sniff suggests a normal RA pressure  of 3 mmHg.  No significant valvular abnormalities present.  The atrial septum is intact as assessed by agitated saline bubble study .  No pericardial effusion is present.  _____________________________________________________________________________  __        Left Ventricle  Global and regional left ventricular function is hyperkinetic with an EF of  65-70%. Left ventricular wall thickness is normal. Left ventricular size is  normal. Left ventricular diastolic function is normal.     Right  Ventricle  Right ventricular function, chamber size, wall motion, and thickness are  normal.     Atria  Both atria appear normal. The atrial septum is intact as assessed by agitated  saline bubble study .     Mitral Valve  The mitral valve is normal.        Aortic Valve  The valve leaflets are not well visualized. On Doppler interrogation, there is  no significant stenosis or regurgitation.     Tricuspid Valve  The tricuspid valve is normal. The peak velocity of the tricuspid regurgitant  jet is not obtainable.     Pulmonic Valve  The pulmonic valve is normal.     Vessels  The thoracic aorta is normal. IVC diameter <2.1 cm collapsing >50% with sniff  suggests a normal RA pressure of 3 mmHg.     Pericardium  No pericardial effusion is present.        Miscellaneous  No significant valvular abnormalities present.     Compared to Previous Study  There is no prior study for direct comparison.  _____________________________________________________________________________  __                          Report approved by: Silas Davis 12/10/2020 10:25 AM              _____________________________________________________________________________  __            Discharge Medications   Discharge Medication List as of 12/11/2020 11:40 AM      START taking these medications    Details   amLODIPine (NORVASC) 5 MG tablet Take 1 tablet (5 mg) by mouth daily, Disp-30 tablet, R-1, E-Prescribe      aspirin (ASA) 81 MG chewable tablet Take 1 tablet (81 mg) by mouth daily, Disp-30 tablet, R-1, E-Prescribe      atorvastatin (LIPITOR) 40 MG tablet Take 1 tablet (40 mg) by mouth every evening, Disp-30 tablet, R-1, E-Prescribe      clopidogrel (PLAVIX) 75 MG tablet Take 1 tablet (75 mg) by mouth daily, Disp-90 tablet, R-0, E-Prescribe              Allergies    No Known Allergies

## 2020-12-11 NOTE — PLAN OF CARE
OT: after chart review and conversation with this pt it is concluded that this pt has no acute OT needs. Pt up in room and hallway I and with no LOB or difficulty with ADL. Pt with paresthesia in L 4th/5th digits which increases with tinnel's at Guye[place]'s canal and with ULTT. Recommend outpatient hand therapy. Defer OT today.

## 2020-12-11 NOTE — PLAN OF CARE
Neuro: A&Ox4. Numbness in L hand unchanged. Q4H neuros intact  Cardiac: SR. VSS. B/P: 138/100, T: 98.4, P: 59, R: 18   Respiratory: Sating >95% on RA.  GI/: Adequate urine output. No BM  Diet/appetite: Tolerating regular diet  Activity:  Stand by assist in room and halls  Pain: At acceptable level on current regimen.   Skin: No new deficits noted.  LDA's: PIV    PRN orders to keep SBP<180 and DBP <110. BP stable this shift, no PRNs given.    Plan: Continue with POC. Notify primary team with changes.

## 2020-12-12 ENCOUNTER — APPOINTMENT (OUTPATIENT)
Dept: ULTRASOUND IMAGING | Facility: CLINIC | Age: 65
End: 2020-12-12
Attending: PHYSICIAN ASSISTANT
Payer: COMMERCIAL

## 2020-12-12 PROBLEM — I10 ESSENTIAL HYPERTENSION: Status: ACTIVE | Noted: 2020-12-12

## 2020-12-12 PROBLEM — F41.9 ANXIETY: Status: ACTIVE | Noted: 2020-12-12

## 2020-12-12 LAB
ALBUMIN UR-MCNC: 10 MG/DL
APPEARANCE UR: CLEAR
BILIRUB UR QL STRIP: NEGATIVE
CAOX CRY #/AREA URNS HPF: ABNORMAL /HPF
COLOR UR AUTO: YELLOW
FLUAV+FLUBV RNA SPEC QL NAA+PROBE: NEGATIVE
FLUAV+FLUBV RNA SPEC QL NAA+PROBE: NEGATIVE
GLUCOSE UR STRIP-MCNC: NEGATIVE MG/DL
HGB UR QL STRIP: ABNORMAL
HYALINE CASTS #/AREA URNS LPF: 3 /LPF (ref 0–2)
INTERPRETATION ECG - MUSE: NORMAL
KETONES UR STRIP-MCNC: NEGATIVE MG/DL
LABORATORY COMMENT REPORT: NORMAL
LEUKOCYTE ESTERASE UR QL STRIP: NEGATIVE
MAGNESIUM SERPL-MCNC: 2.5 MG/DL (ref 1.6–2.3)
MUCOUS THREADS #/AREA URNS LPF: PRESENT /LPF
NITRATE UR QL: NEGATIVE
PH UR STRIP: 5.5 PH (ref 5–7)
POTASSIUM SERPL-SCNC: 3.2 MMOL/L (ref 3.4–5.3)
POTASSIUM SERPL-SCNC: 3.6 MMOL/L (ref 3.4–5.3)
RBC #/AREA URNS AUTO: 4 /HPF (ref 0–2)
RSV RNA SPEC QL NAA+PROBE: NEGATIVE
SARS-COV-2 RNA SPEC QL NAA+PROBE: NEGATIVE
SOURCE: ABNORMAL
SP GR UR STRIP: 1.02 (ref 1–1.03)
SPECIMEN SOURCE: NORMAL
TROPONIN I SERPL-MCNC: <0.015 UG/L (ref 0–0.04)
UROBILINOGEN UR STRIP-MCNC: NORMAL MG/DL (ref 0–2)
WBC #/AREA URNS AUTO: 1 /HPF (ref 0–5)

## 2020-12-12 PROCEDURE — 250N000013 HC RX MED GY IP 250 OP 250 PS 637: Performed by: FAMILY MEDICINE

## 2020-12-12 PROCEDURE — 250N000013 HC RX MED GY IP 250 OP 250 PS 637: Performed by: PHYSICIAN ASSISTANT

## 2020-12-12 PROCEDURE — 93975 VASCULAR STUDY: CPT | Mod: 26 | Performed by: RADIOLOGY

## 2020-12-12 PROCEDURE — 36415 COLL VENOUS BLD VENIPUNCTURE: CPT | Performed by: PHYSICIAN ASSISTANT

## 2020-12-12 PROCEDURE — 84132 ASSAY OF SERUM POTASSIUM: CPT | Performed by: FAMILY MEDICINE

## 2020-12-12 PROCEDURE — 81001 URINALYSIS AUTO W/SCOPE: CPT | Performed by: FAMILY MEDICINE

## 2020-12-12 PROCEDURE — 96374 THER/PROPH/DIAG INJ IV PUSH: CPT

## 2020-12-12 PROCEDURE — 87636 SARSCOV2 & INF A&B AMP PRB: CPT | Performed by: FAMILY MEDICINE

## 2020-12-12 PROCEDURE — G0378 HOSPITAL OBSERVATION PER HR: HCPCS

## 2020-12-12 PROCEDURE — 99204 OFFICE O/P NEW MOD 45 MIN: CPT | Performed by: NURSE PRACTITIONER

## 2020-12-12 PROCEDURE — 99220 PR INITIAL OBSERVATION CARE,LEVEL III: CPT | Performed by: PHYSICIAN ASSISTANT

## 2020-12-12 PROCEDURE — 93975 VASCULAR STUDY: CPT

## 2020-12-12 PROCEDURE — 36415 COLL VENOUS BLD VENIPUNCTURE: CPT | Performed by: FAMILY MEDICINE

## 2020-12-12 PROCEDURE — 84132 ASSAY OF SERUM POTASSIUM: CPT | Performed by: PHYSICIAN ASSISTANT

## 2020-12-12 PROCEDURE — 250N000011 HC RX IP 250 OP 636: Performed by: PHYSICIAN ASSISTANT

## 2020-12-12 RX ORDER — ACETAMINOPHEN 650 MG/1
650 SUPPOSITORY RECTAL EVERY 4 HOURS PRN
Status: DISCONTINUED | OUTPATIENT
Start: 2020-12-12 | End: 2020-12-13

## 2020-12-12 RX ORDER — CLOPIDOGREL BISULFATE 75 MG/1
75 TABLET ORAL DAILY
Status: DISCONTINUED | OUTPATIENT
Start: 2020-12-12 | End: 2020-12-13 | Stop reason: HOSPADM

## 2020-12-12 RX ORDER — HYDROXYZINE HYDROCHLORIDE 25 MG/1
25 TABLET, FILM COATED ORAL EVERY 6 HOURS PRN
Status: DISCONTINUED | OUTPATIENT
Start: 2020-12-12 | End: 2020-12-13 | Stop reason: HOSPADM

## 2020-12-12 RX ORDER — ONDANSETRON 4 MG/1
4 TABLET, ORALLY DISINTEGRATING ORAL EVERY 6 HOURS PRN
Status: DISCONTINUED | OUTPATIENT
Start: 2020-12-12 | End: 2020-12-13 | Stop reason: HOSPADM

## 2020-12-12 RX ORDER — ATORVASTATIN CALCIUM 40 MG/1
40 TABLET, FILM COATED ORAL EVERY EVENING
Status: DISCONTINUED | OUTPATIENT
Start: 2020-12-12 | End: 2020-12-13 | Stop reason: HOSPADM

## 2020-12-12 RX ORDER — ONDANSETRON 2 MG/ML
4 INJECTION INTRAMUSCULAR; INTRAVENOUS EVERY 6 HOURS PRN
Status: DISCONTINUED | OUTPATIENT
Start: 2020-12-12 | End: 2020-12-13 | Stop reason: HOSPADM

## 2020-12-12 RX ORDER — AMLODIPINE BESYLATE 5 MG/1
5 TABLET ORAL DAILY
Status: DISCONTINUED | OUTPATIENT
Start: 2020-12-12 | End: 2020-12-12

## 2020-12-12 RX ORDER — LORAZEPAM 0.5 MG/1
0.5 TABLET ORAL ONCE
Status: COMPLETED | OUTPATIENT
Start: 2020-12-12 | End: 2020-12-12

## 2020-12-12 RX ORDER — AMLODIPINE BESYLATE 10 MG/1
10 TABLET ORAL DAILY
Status: DISCONTINUED | OUTPATIENT
Start: 2020-12-12 | End: 2020-12-12

## 2020-12-12 RX ORDER — LABETALOL HYDROCHLORIDE 5 MG/ML
10 INJECTION, SOLUTION INTRAVENOUS ONCE
Status: COMPLETED | OUTPATIENT
Start: 2020-12-12 | End: 2020-12-12

## 2020-12-12 RX ORDER — ASPIRIN 81 MG/1
81 TABLET, CHEWABLE ORAL DAILY
Status: DISCONTINUED | OUTPATIENT
Start: 2020-12-12 | End: 2020-12-13 | Stop reason: HOSPADM

## 2020-12-12 RX ORDER — CLONIDINE HYDROCHLORIDE 0.2 MG/1
0.2 TABLET ORAL 2 TIMES DAILY
Status: DISCONTINUED | OUTPATIENT
Start: 2020-12-12 | End: 2020-12-12

## 2020-12-12 RX ORDER — AMLODIPINE BESYLATE 10 MG/1
10 TABLET ORAL DAILY
Status: DISCONTINUED | OUTPATIENT
Start: 2020-12-12 | End: 2020-12-13

## 2020-12-12 RX ORDER — LISINOPRIL 5 MG/1
10 TABLET ORAL DAILY
Status: DISCONTINUED | OUTPATIENT
Start: 2020-12-12 | End: 2020-12-13 | Stop reason: HOSPADM

## 2020-12-12 RX ORDER — POTASSIUM CHLORIDE 750 MG/1
40 TABLET, EXTENDED RELEASE ORAL ONCE
Status: COMPLETED | OUTPATIENT
Start: 2020-12-12 | End: 2020-12-12

## 2020-12-12 RX ORDER — POTASSIUM CHLORIDE 750 MG/1
20 TABLET, EXTENDED RELEASE ORAL ONCE
Status: COMPLETED | OUTPATIENT
Start: 2020-12-12 | End: 2020-12-12

## 2020-12-12 RX ORDER — ACETAMINOPHEN 500 MG
1000 TABLET ORAL EVERY 6 HOURS PRN
Status: DISCONTINUED | OUTPATIENT
Start: 2020-12-12 | End: 2020-12-13 | Stop reason: HOSPADM

## 2020-12-12 RX ORDER — LANOLIN ALCOHOL/MO/W.PET/CERES
3 CREAM (GRAM) TOPICAL AT BEDTIME
Status: DISCONTINUED | OUTPATIENT
Start: 2020-12-12 | End: 2020-12-13 | Stop reason: HOSPADM

## 2020-12-12 RX ORDER — AMLODIPINE BESYLATE 5 MG/1
5 TABLET ORAL ONCE
Status: COMPLETED | OUTPATIENT
Start: 2020-12-12 | End: 2020-12-12

## 2020-12-12 RX ORDER — ESCITALOPRAM OXALATE 5 MG/1
5 TABLET ORAL DAILY
Status: DISCONTINUED | OUTPATIENT
Start: 2020-12-12 | End: 2020-12-13

## 2020-12-12 RX ADMIN — POTASSIUM CHLORIDE 40 MEQ: 750 TABLET, EXTENDED RELEASE ORAL at 05:38

## 2020-12-12 RX ADMIN — MELATONIN TAB 3 MG 3 MG: 3 TAB at 22:03

## 2020-12-12 RX ADMIN — CLONIDINE HYDROCHLORIDE 0.2 MG: 0.2 TABLET ORAL at 12:15

## 2020-12-12 RX ADMIN — AMLODIPINE BESYLATE 5 MG: 5 TABLET ORAL at 01:12

## 2020-12-12 RX ADMIN — LISINOPRIL 10 MG: 5 TABLET ORAL at 17:16

## 2020-12-12 RX ADMIN — LABETALOL HYDROCHLORIDE 10 MG: 5 INJECTION, SOLUTION INTRAVENOUS at 06:34

## 2020-12-12 RX ADMIN — CLOPIDOGREL BISULFATE 75 MG: 75 TABLET ORAL at 08:14

## 2020-12-12 RX ADMIN — ASPIRIN 81 MG CHEWABLE TABLET 81 MG: 81 TABLET CHEWABLE at 08:14

## 2020-12-12 RX ADMIN — LORAZEPAM 0.5 MG: 0.5 TABLET ORAL at 04:35

## 2020-12-12 RX ADMIN — AMLODIPINE BESYLATE 10 MG: 10 TABLET ORAL at 08:33

## 2020-12-12 RX ADMIN — ESCITALOPRAM 5 MG: 5 TABLET, FILM COATED ORAL at 12:15

## 2020-12-12 RX ADMIN — POTASSIUM CHLORIDE 20 MEQ: 750 TABLET, EXTENDED RELEASE ORAL at 14:35

## 2020-12-12 RX ADMIN — ATORVASTATIN CALCIUM 40 MG: 40 TABLET, FILM COATED ORAL at 19:52

## 2020-12-12 ASSESSMENT — ENCOUNTER SYMPTOMS
NECK PAIN: 0
APPETITE CHANGE: 0
DIAPHORESIS: 0
CONFUSION: 0
LIGHT-HEADEDNESS: 0
WEAKNESS: 0
CHILLS: 0
NERVOUS/ANXIOUS: 1
BRUISES/BLEEDS EASILY: 0
NECK STIFFNESS: 0
HEADACHES: 0
NUMBNESS: 1
BACK PAIN: 0
DECREASED CONCENTRATION: 1
DYSPHORIC MOOD: 0
DYSURIA: 0
TROUBLE SWALLOWING: 0
SPEECH DIFFICULTY: 0
ABDOMINAL PAIN: 0
SHORTNESS OF BREATH: 0
HALLUCINATIONS: 0
FEVER: 0
COUGH: 0
NAUSEA: 0
ACTIVITY CHANGE: 0

## 2020-12-12 ASSESSMENT — ACTIVITIES OF DAILY LIVING (ADL)
DIFFICULTY_EATING/SWALLOWING: NO
WEAR_GLASSES_OR_BLIND: NO
DIFFICULTY_COMMUNICATING: NO
CONCENTRATING,_REMEMBERING_OR_MAKING_DECISIONS_DIFFICULTY: YES
DOING_ERRANDS_INDEPENDENTLY_DIFFICULTY: YES
TOILETING_ISSUES: NO
FALL_HISTORY_WITHIN_LAST_SIX_MONTHS: NO
DRESSING/BATHING_DIFFICULTY: NO
HEARING_DIFFICULTY_OR_DEAF: NO
WALKING_OR_CLIMBING_STAIRS_DIFFICULTY: NO

## 2020-12-12 ASSESSMENT — MIFFLIN-ST. JEOR: SCORE: 1737.13

## 2020-12-12 NOTE — ED TRIAGE NOTES
Patient states that he checked his blood pressure at home and found it to be over 200 systolic. He states that he was placed on blood pressure medication earlier this week. He complains of anxiousness.
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
## 2020-12-12 NOTE — PLAN OF CARE
"Observation Goals:     -diagnostic tests and consults completed and resulted: Not met/pending  SW and Pscyh consult in AM      -vital signs normal or at patient baseline: Not met  BP (!) 172/114 (BP Location: Left arm)   Pulse 69   Temp 98.1  F (36.7  C) (Oral)   Resp 18   Ht 1.803 m (5' 11\")   Wt 92 kg (202 lb 13.2 oz)   SpO2 99%   BMI 28.29 kg/m        -tolerating oral intake to maintain hydration: Met      -BP improved: Met     -Psych consult has been completed: Not met, pending     -returns to baseline functional status: Met      -safe disposition plan has been identified: Met  "

## 2020-12-12 NOTE — PROGRESS NOTES
"ED OBSERVATION PROGRESS NOTE:  S: Braulio Guardado is a 65 year old male admitted on 12/11/2020. He has a history of anxiety, PTSD, recent diagnosis of HTN, subacute-chronic CVAs, who presented to the ED with elevated BP.    Chief Complaint   Patient presents with     Hypertension     1. Essential hypertension    2. Anxiety        Problem List:  Patient Active Problem List   Diagnosis     Left upper extremity numbness     Abnormal head CT     Hypertensive urgency     Anxiety     Essential hypertension       MEDS:   No current outpatient medications on file.       ALLERGIES:  No Known Allergies    O:BP (!) 143/94 (BP Location: Left arm)   Pulse 71   Temp 98.7  F (37.1  C) (Oral)   Resp 20   Ht 1.803 m (5' 11\")   Wt 92 kg (202 lb 13.2 oz)   SpO2 95%   BMI 28.29 kg/m      Physical Exam   Constitutional: Pt is oriented to person, place, and time.Pt appears well-developed and well-nourished.   HENT:   Head: Normocephalic and atraumatic.   Eyes: Conjunctivae are normal. Pupils are equal, round, and reactive to light.   Neck: Normal range of motion. Neck supple.   Cardiovascular: Normal rate, regular rhythm, normal heart sounds and intact distal pulses.    Pulmonary/Chest: Effort normal and breath sounds normal. No respiratory distress. Pt has no wheezes. Pt has no rales  Abdominal: Soft. Bowel sounds are normal. Pt exhibits no distension and no mass. No tenderness. Pt has no rebound and no guarding.   Musculoskeletal: Normal range of motion. Pt exhibits no edema.   Neurological: Pt is alert and oriented to person, place, and time. Normal reflexes.   Skin: Skin is warm and dry. No rash noted.   Psychiatric: Pt has a normal mood and affect. Behavior is normal. Judgment and thought content normal.     Assessment & Plan     Braulio Guardado is a 65 year old male admitted on 12/11/2020. He has a history of anxiety, PTSD, recent diagnosis of HTN, subacute-chronic CVAs, who presented to the ED with elevated BP.     ##. " Hypertensive Urgency  ##. Hypertension  Patient was just admitted from 20 to 20 and just discharged at about 2PM yesterday where he was admitted with hypertensive urgency and subacute-chronic CVA's and discharged with Amlodipine 5mg daily. Patient states he bought a BP monitor and took his BP last evening which was running over 200's. Reports recent weight loss due to poor appetite. Reports increased anxiety recently however no recent cause for increased stress. Denies any chest pain, LE edema, SOB, headache, changes in vision, nausea, vomiting, abdominal pain, cough, fever, chills. Denies any illicit drug use, alcohol use or tobacco use. In the ED, HR 60's-80's, 's-200's/. Labs show CMP with K 3.3, glucose 119 otherwise normal. Normal CBC. UA with 10 protein, trace blood, 1 WBC, 4 RBC, 3 hyaline casts, few calcium oxalate. He was given Amlodipine 5mg in the ED initially and subsequently increased to 10 mg daily. Additionally, he was given Labetalol with no significant improvement. Discussed case with attending this morning. Recommend adding Lisinopril 10 mg daily and clonidine 0.2 mg x 1 with improvement of BP to 124/84. US renal to r/o renal artery stenosis was obtained which was essentially normal. 24 hour urine metanephrine was also ordered for further evaluation. Patient feeling better this afternoon. Denies chest pain, SOB, dizziness or vision changes. Anticipate discharge tomorrow given continued BP improvement.   - Amlodipine to 10mg once daily  - Lisinopril 10 mg daily  - Monitor BP closely.    - Hydroxyzine 25mg po q6h prn  -  Consult for short term in home visit with BP management  - Telemetry     ##. Hypokalemia: K 3.3. Replace per protocol and corrected. K 3.6.      ##. H/o PTSD   ##. Anxiety  Reports PTSD related to parents, who are both , mother passed in August. Patient did not elaborate any further. Denies any recent acute stressors. Denies being a caffeine drinker.  States he has been treated for anxiety in the remote past using several medications which he did not respond well to including Prozac, Sertraline, Amitriptyline. Appears very anxious which could be driving BP up. Patient was seen by psychiatry today. Recommend starting Escitalopram 5 mg daily x 5 days and then increasing to 10 mg daily. Will need to follow up with psychiatry after hospital discharge.   - Atarax 25mg po q6h prn anxiety     ##. Multiple subacute-chronic CVAs: Work up at last admission with MRI Brain showing multiple areas of infarction of various ages. MRI C Spine with multilevel high grade bilateral neural foraminal narrowing which may impinge on exiting nerve roots. Neurologic exam normal except for decreased sensation in left 4th and 5th fingers. Per Neurology this may be related to focal ulnar nerve issues. LDL elevated otherwise normal A1c, TTE, carotid artery ultrasound. Loaded with aspirin and then started on high intensity statin and daily low-dose aspirin and daily plavix.    - Neurology follow-up clinic visit on 12/21.    - Continue ASA 81mg daily + Plavix 75mg daily for 90d followed by monotherapy w/ ASA 81mg daily  - Atorvastatin 40mg daily  - On a 14 day Ziopatch monitor to rule out arrhythmias as a potential cause.     ##. Left-sided 4th, 5th Finger Numbness: per previous Neurology note, not consistent with stroke syndromes related to lesion seen on head imaging.  More likely to be peripheral neuropathy.    - F/u Neurology clinic visit  - Consider Neurosurgery referral     Diet:   Regular diet  DVT Prophylaxis: expect short observation stay  Bingham Catheter: not present  Code Status:  full code    Signed:  Paula Woodward PA-C  December 12, 2020 at 5:28 PM

## 2020-12-12 NOTE — UTILIZATION REVIEW
Concurrent stay review; Secondary Review Determination    Under the authority of the Utilization Management Committee, the utilization review process indicated a secondary review on the above patient. The review outcome is based on review of the medical records, discussions with staff, and applying clinical experience noted on the date of the review.    (x) Observation Status Appropriate - Concurrent stay review        RATIONALE FOR DETERMINATION:65-year-old male who was admitted to the hospital on 12/9/2020 with history of multiple months of left upper extremity and finger numbness presented to hospital with evaluation of his numbness as well as significant hypertension found in the clinic.  Patient found to have subacute/chronic strokes along with hypertensive urgency.  Patient discharged 12/11/2020.  Patient represented to the hospital later on the evening of discharge due to anxiety over his ongoing systolic hypertension with blood pressures greater than 200.  Observation care appropriate for initial titration of blood pressure medications prior to safe discharge.    Patient is clinically improving and there is no clear indication to change patient's status to inpatient. The severity of illness, intensity of service provided, expected LOS and risk for adverse outcome make the care appropriate for observation.    This document was produced using voice recognition software    The information on this document is developed by the utilization review team in order for the business office to ensure compliance. This only denotes the appropriateness of proper admission status and does not reflect the quality of care rendered.    The definitions of Inpatient Status and Observation Status used in making the determination above are those provided in the CMS Coverage Manual, Chapter 1 and Chapter 6, section 70.4.    Sincerely,    Eleuterio Farrar MD  Utilization Review  Physician Advisor  [facility].

## 2020-12-12 NOTE — PROGRESS NOTES
Care Management Follow Up    Length of Stay (days): 0    Expected Discharge Date:  12/13/2020     Concerns to be Addressed:     Home Care     Patient plan of care discussed at interdisciplinary rounds: Yes    Anticipated Discharge Disposition:  Home     Anticipated Discharge Services:  None  Anticipated Discharge DME:  None    Patient/family educated on Medicare website which has current facility and service quality ratings:  N/A          Education Provided on the Discharge Plan:  Yes  Patient/Family in Agreement with the Plan:  Yes    Referrals Placed by CM/SW:  [facility] for BP checks  Private pay costs discussed: Not applicable    Additional Information:  Per [facility], pt is unable to start home care until he establishes care with PCP. Pt given several resources for PCP providers. Pt will follow up w/ [facility] when PCP appt is scheduled. SW to follow up as needed.      Gloria Connell) RAFAEL Rojas, [facility]  ED/OBS Unit   [facility]  Phone: [phone]  ED/OBS Pager: [phone]   After Hours Pager: [phone] (Mon-Sat 4pm-midnight)

## 2020-12-12 NOTE — PROGRESS NOTES
BP reading on home machine @0607: 184/112 HR 81    BP reading here on our machine @ 0610: 173/108 HR 80    Was given Labetalol 10mg IV onetime    BP on recheck on home machine @ 0703 178/113 HR 69    BP reading here on our machine @0700 159/102 HR 77

## 2020-12-12 NOTE — PROGRESS NOTES
Care Management Initial Consult      General Information  Assessment completed with: Patient,   Type of CM/SW Visit: Initial Assessment  Primary Care Provider verified and updated as needed:     Readmission within the last 30 days: no previous admission in last 30 days      Reason for Consult: discharge planning  Advance Care Planning: Advance Care Planning Reviewed: no concerns identified       Communication Assessment  Patient's communication style: spoken language (English or Bilingual)    Hearing Difficulty or Deaf: no   Wear Glasses or Blind: no    Cognitive  Cognitive/Neuro/Behavioral: WDL     Arousal Level: opens eyes spontaneously  Orientation: oriented x 4  Mood/Behavior: anxious  Best Language: 0 - No aphasia  Speech: clear    Living Environment:   People in home: alone     Current living Arrangements: house      Able to return to prior arrangements: yes     Family/Social Support:  Care provided by: self  Provides care for: no one  Marital Status: Single  Sibling(s)          Description of Support System: Supportive    Support Assessment: Lacks adequate emotional support    Current Resources:   Skilled Home Care Services:    Community Resources: None  Equipment currently used at home: none  Supplies currently used at home: None    Employment/Financial:  Employment Status:          Financial Concerns: No concerns identified   Referral to Financial Counselor: No     Lifestyle & Psychosocial Needs:     Socioeconomic History     Marital status: Single     Spouse name: Not on file     Number of children: Not on file     Years of education: Not on file     Highest education level: Not on file     Functional Status:  Prior to admission patient needed assistance:      Mental Health Status:  Mental Health Status: Current Concern  Mental Health Management: Medication(anxiety)    Chemical Dependency Status:     Chemical Dependency Management: Other (see comment)(no concerns)    Values/Beliefs:  Spiritual, Cultural  "Beliefs, Methodist Practices, Values that affect care: yes       Cultural/Methodist Practices Patient Routinely Participates In: other (see comments)([employer]ism)       Additional Information:  SW met w/ pt to introduce self and explain role on care team. Pt presents with new onset hypertension and exacerbation of anxiety s/p CVA. Pt lives alone with his dog and states he is highly concerned about his blood pressure. Pt reports he doesn't currently have a PCP received information from his neurologists on PCP choices in the [facility]. Pt prefers a \"holistic\" female CNP.  Pt states that he believes he may benefit from home care to monitor is blood pressure but requests information on various agencies rather than a referral at this time. Pt also requests information on temporary foster care for his dog when he is feeling like he is unable to care for him. Pt discussed the possibility of applying for disability and would like resources. CHUY provided the following resources to pt.    1. List of home care agencies in [facility]  2. Contact information for temporary pet foster care ([employer]  3. List of [facility] program agencies ([facility]) in [facility] to follow up as needed.      Gloria Connell) RAFAEL Rojas, [facility]  ED/OBS Unit   [facility]  Phone: [phone]  ED/OBS Pager: [phone]   After Hours Pager: [phone] (Mon-Sat 4pm-midnight)        "

## 2020-12-12 NOTE — PLAN OF CARE
Observation Goals:     -diagnostic tests and consults completed and resulted: Not met, 24 hour urine being collected     -vital signs normal or at patient baseline: In progress, BP slowly returning to baseline      -tolerating oral intake to maintain hydration: Met      -BP improved: Met     -Psych consult has been completed: Met     -returns to baseline functional status: Met      -safe disposition plan has been identified: Met    Roel Knott RN

## 2020-12-12 NOTE — ED PROVIDER NOTES
ED Provider Note  [facility]      History     Chief Complaint   Patient presents with     Hypertension     HPI  Braulio Guardado is a 65 year old male with a medical history significant for subacute-chronic CVAs, HTN, PTSD, and severe anxiety who presents to the Emergency Department for evaluation of high blood pressure.     Per chart review, patient was hospitalized here from 12/9/2020-12/11/2020 hypertensive urgency and multiple subacute-chronic CVAs. He was loaded on aspirin and then started on high intensity statin and daily low-dose aspirin and daily plavix (90 day course). He was put on 14 day Ziopatch monitor to rule out arrhythmias as a potential cause. For his HTN, he was started on Amlodipine.    Patient noted discharge from hospital about a blood pressure cuff this evening.  Patient is heightened anxiety which he acknowledges.  Patient checked his blood pressure and it was over 200 tonight.  He now presented here for evaluation.  Patient feeling anxious about this.  Does not want to have another stroke denies any new neurological symptoms no focal weakness vision changes headache chest pain shortness of breath nausea vomiting etc.            Past Medical History  Past Medical History:   Diagnosis Date     Cerebral infarction (H)      Hyperlipidemia      Hypertension      History reviewed. No pertinent surgical history.       amLODIPine (NORVASC) 5 MG tablet       aspirin (ASA) 81 MG chewable tablet       atorvastatin (LIPITOR) 40 MG tablet       clopidogrel (PLAVIX) 75 MG tablet      No Known Allergies  Family History  History reviewed. No pertinent family history.  Social History   Social History     Tobacco Use     Smoking status: Never Smoker     Smokeless tobacco: Never Used   Substance Use Topics     Alcohol use: Never     Frequency: Never     Drug use: Yes     Types: Marijuana      Past medical history, past surgical history, medications, allergies, family history, and  "social history were reviewed with the patient. No additional pertinent items.       Review of Systems   Constitutional: Negative for activity change, appetite change, chills, diaphoresis and fever.   HENT: Negative for trouble swallowing.    Eyes: Negative for visual disturbance.   Respiratory: Negative for cough and shortness of breath.    Cardiovascular: Negative for chest pain and leg swelling.   Gastrointestinal: Negative for abdominal pain and nausea.   Genitourinary: Negative for dysuria.   Musculoskeletal: Negative for back pain, gait problem, neck pain and neck stiffness.   Neurological: Positive for numbness (left hand unchanged from prev admit). Negative for syncope, speech difficulty, weakness, light-headedness and headaches.   Hematological: Does not bruise/bleed easily.   Psychiatric/Behavioral: Positive for decreased concentration. Negative for confusion, dysphoric mood and hallucinations. The patient is nervous/anxious.    All other systems reviewed and are negative.    A complete review of systems was performed with pertinent positives and negatives noted in the HPI, and all other systems negative.    Physical Exam   BP: (!) 203/136  Pulse: 84  Temp: 97.6  F (36.4  C)  Resp: 18  Height: 180.3 cm (5' 11\")  Weight: 90.7 kg (200 lb)  SpO2: 97 %  Physical Exam  Vitals signs and nursing note reviewed.   Constitutional:       General: He is in acute distress.      Appearance: He is well-developed. He is not diaphoretic.      Comments: Patient blood pressure improving but very anxious here in the ER.  No focal neurological findings or complaints noted.   HENT:      Head: Normocephalic and atraumatic.      Nose: Nose normal.      Mouth/Throat:      Mouth: Mucous membranes are moist.   Eyes:      General: No scleral icterus.     Extraocular Movements: Extraocular movements intact.      Conjunctiva/sclera: Conjunctivae normal.      Pupils: Pupils are equal, round, and reactive to light.   Neck:      " Musculoskeletal: Normal range of motion and neck supple.   Cardiovascular:      Rate and Rhythm: Normal rate.   Pulmonary:      Effort: No respiratory distress.   Abdominal:      Tenderness: There is no guarding.   Musculoskeletal:         General: No swelling or tenderness.   Skin:     General: Skin is warm and dry.      Capillary Refill: Capillary refill takes less than 2 seconds.      Findings: No rash.   Neurological:      Mental Status: He is alert and oriented to person, place, and time. Mental status is at baseline.      Comments: Patient describes some numbness in the left hand and the fingers which is chronic unchanged otherwise no focal weakness noted or other new neurological findings noted on examination.   Psychiatric:      Comments: Marked anxiousness noted here in the ER.           ED Course     10:49 PM  The patient was seen and examined by Calvin Garvin MD in Room ED23.       Patient evaluated here in the ER continually monitored.  Initial blood pressure gone down to 160 systolic.  Patient very anxious has been focusing on blood pressure cuff here blood pressure had increased to 170s over 110.  We discussed with neurology as they were involved before.  At this point they felt no other intervention at this point.  Initial discussion with patient as laboratory testing EKG did not show any hyperacute changes was that it would be okay to go home and by sleeping if he could be relaxed blood pressure should improve we will continue medication to see how it does.  Patient is point is very uncomfortable with this plan markedly anxious and states this will not help him as he will be right back.    Applied this point will admit to ED observation overnight to monitor blood pressure patient give another 5 mg of Norvasc orally.  Will consider also psychiatry consultation if ongoing anxiety issues and further education on blood pressure monitoring etc. and to reassure patient blood pressure is improved and have  better control.  If still having blood pressure control issues will consider other further consultation further recommendations for blood pressure management.  Patient does agree with this plan.    Procedures             EKG Interpretation:      Interpreted by Calvin Garvin MD  Time reviewed: 2239  Symptoms at time of EKG: hypertension   Rhythm: normal sinus   Rate: normal  Axis: normal  Ectopy: none  Conduction: normal  ST Segments/ T Waves: No hyperacute ST-T wave changes  Q Waves: none  Comparison to prior: No old EKG available    Clinical Impression: normal EKG                      Results for orders placed or performed during the hospital encounter of 12/11/20   CBC with platelets differential     Status: None   Result Value Ref Range    WBC 8.3 4.0 - 11.0 10e9/L    RBC Count 5.50 4.4 - 5.9 10e12/L    Hemoglobin 16.5 13.3 - 17.7 g/dL    Hematocrit 48.4 40.0 - 53.0 %    MCV 88 78 - 100 fl    MCH 30.0 26.5 - 33.0 pg    MCHC 34.1 31.5 - 36.5 g/dL    RDW 12.3 10.0 - 15.0 %    Platelet Count 263 150 - 450 10e9/L    Diff Method Automated Method     % Neutrophils 68.4 %    % Lymphocytes 20.8 %    % Monocytes 9.2 %    % Eosinophils 0.8 %    % Basophils 0.6 %    % Immature Granulocytes 0.2 %    Nucleated RBCs 0 0 /100    Absolute Neutrophil 5.7 1.6 - 8.3 10e9/L    Absolute Lymphocytes 1.7 0.8 - 5.3 10e9/L    Absolute Monocytes 0.8 0.0 - 1.3 10e9/L    Absolute Eosinophils 0.1 0.0 - 0.7 10e9/L    Absolute Basophils 0.1 0.0 - 0.2 10e9/L    Abs Immature Granulocytes 0.0 0 - 0.4 10e9/L    Absolute Nucleated RBC 0.0    Comprehensive metabolic panel     Status: Abnormal   Result Value Ref Range    Sodium 141 133 - 144 mmol/L    Potassium 3.3 (L) 3.4 - 5.3 mmol/L    Chloride 108 94 - 109 mmol/L    Carbon Dioxide 25 20 - 32 mmol/L    Anion Gap 7 3 - 14 mmol/L    Glucose 119 (H) 70 - 99 mg/dL    Urea Nitrogen 19 7 - 30 mg/dL    Creatinine 0.90 0.66 - 1.25 mg/dL    GFR Estimate >90 >60 mL/min/[1.73_m2]    GFR Estimate If  Black >90 >60 mL/min/[1.73_m2]    Calcium 9.2 8.5 - 10.1 mg/dL    Bilirubin Total 0.7 0.2 - 1.3 mg/dL    Albumin 3.9 3.4 - 5.0 g/dL    Protein Total 7.5 6.8 - 8.8 g/dL    Alkaline Phosphatase 102 40 - 150 U/L    ALT 34 0 - 70 U/L    AST 20 0 - 45 U/L   UA with Microscopic reflex to Culture     Status: Abnormal    Specimen: Urine Midstream; Midstream Urine   Result Value Ref Range    Color Urine Yellow     Appearance Urine Clear     Glucose Urine Negative NEG^Negative mg/dL    Bilirubin Urine Negative NEG^Negative    Ketones Urine Negative NEG^Negative mg/dL    Specific Gravity Urine 1.018 1.003 - 1.035    Blood Urine Trace (A) NEG^Negative    pH Urine 5.5 5.0 - 7.0 pH    Protein Albumin Urine 10 (A) NEG^Negative mg/dL    Urobilinogen mg/dL Normal 0.0 - 2.0 mg/dL    Nitrite Urine Negative NEG^Negative    Leukocyte Esterase Urine Negative NEG^Negative    Source Midstream Urine     WBC Urine 1 0 - 5 /HPF    RBC Urine 4 (H) 0 - 2 /HPF    Mucous Urine Present (A) NEG^Negative /LPF    Hyaline Casts 3 (H) 0 - 2 /LPF    Calcium Oxalate Few (A) NEG^Negative /HPF   EKG 12-lead, tracing only     Status: None (Preliminary result)   Result Value Ref Range    Interpretation ECG Click View Image link to view waveform and result      Medications   lidocaine 1 % 0.1-1 mL (has no administration in time range)   lidocaine (LMX4) cream (has no administration in time range)   sodium chloride (PF) 0.9% PF flush 3 mL (3 mLs Intracatheter Given 12/12/20 0113)   sodium chloride (PF) 0.9% PF flush 3 mL (has no administration in time range)   amLODIPine (NORVASC) tablet 5 mg (5 mg Oral Given 12/12/20 0112)        Assessments & Plan (with Medical Decision Making)  65-year-old male with new diagnosis of hypertension who is on Norvasc was just discharged in the hospital within the last day for hypertensive urgency.  Patient though noted to have some chronic numbness in his left hand had an MRI done by neurology showing some chronic stroke  findings but not related to his hand numbness according to the patient.  Patient has hypertension and was discharged with amlodipine etc.  Patient is a follow-up ointment on Monday.  Patient brought a blood pressure cuff this evening noted blood pressure over 200 very anxious agitated without new neuro symptoms presented to ER blood pressure was improving discussed with neurology labs are drawn EKG etc. all were stable.  Reassurance was given to the patient the patient very uncomfortable going home becoming more anxious blood pressure was driving up to 170s to 190.  Patient given 5 mg of Norvasc here will be admitted to ED observation overnight for ongoing monitoring to see if we get better blood pressure control along with this consider severe anxiety driving this also that psychiatry could be consulted if available if needed and further recommendations for education also on blood pressure measurements at home would be appreciated by the patient.  Patient agrees with plan.  Admitted to ED observation.           I have reviewed the nursing notes. I have reviewed the findings, diagnosis, plan and need for follow up with the patient.    New Prescriptions    No medications on file       Final diagnoses:   Essential hypertension   Anxiety   I, Sylvia Parkinson, am serving as a trained medical scribe to document services personally performed by Calvin Garvin MD, based on the provider's statements to me.      ICalvin MD, was physically present and have reviewed and verified the accuracy of this note documented by Sylvia Parkinson.     --  Calvin Garvin    [facility] EMERGENCY DEPARTMENT    This note was created at least in part by the use of dragon voice dictation system. Inadvertent typographical errors may still exist.  Calvin Garvin MD.    Patient evaluated in the emergency department during the COVID-19 pandemic period. Careful attention to patients safety was addressed throughout the evaluation.  Evaluation and treatment management was initiated with disposition made efficiently and appropriate as possible to minimize any risk of potential exposure to patient during this evaluation.    12/11/2020     Calvin Garvin MD  12/12/20 0116

## 2020-12-12 NOTE — H&P
[facility]     History and Physical - ED Observation Service       Date of Admission:  2020    Assessment & Plan   Braulio Guardado is a 65 year old male admitted on 2020. He has a history of anxiety, PTSD, recent diagnosis of HTN, subacute-chronic CVAs, who presented to the ED with elevated BP.    ##. Hypertensive Urgency  ##. Hypertension  Patient was just admitted from 20 to 20 and just discharged at about 2PM yesterday where he was admitted with hypertensive urgency and subacute-chronic CVA's and discharged with Amlodipine 5mg daily. Patient states he bought a BP monitor and took his BP last evening which was running over 200's. Reports recent weight loss due to poor appetite. Reports increased anxiety recently however no recent cause for increased stress. Denies any chest pain, LE edema, SOB, headache, changes in vision, nausea, vomiting, abdominal pain, cough, fever, chills. Denies any illicit drug use, alcohol use or tobacco use. In the ED, HR 60's-80's, 's-200's/. Labs show CMP with K 3.3, glucose 119 otherwise normal. Normal CBC. UA with 10 protein, trace blood, 1 WBC, 4 RBC, 3 hyaline casts, few calcium oxalate. He was given Amlodipine 5mg in the ED.   - Increase Amlodipine to 10mg once daily  - Monitor BP closely. May need a second agent  - Treat anxiety first. Give ativan 0.5mg po x 1  - Start Hydroxyzine 25mg po q6h prn  -  Consult for short term in home visit with BP management  - Correlate Hospital BP with Home BP monitor  - Offer reassurance  - Telemetry     ##. Hypokalemia: K 3.3  - Replace per protocol     ##. H/o PTSD   ##. Anxiety  Reports PTSD related to parents, who are both , mother passed in August. Patient did not elaborate any further. Denies any recent acute stressors. Denies being a caffeine drinker. States he has been treated for anxiety in the remote past using several medications which he did not  respond well to including Prozac, Sertraline, Amitriptyline. Appears very anxious which could be driving BP up  - Psychiatry consult  - Ativan 0.5mg po x 1 now and may use intermittently  - Start Atarax 25mg po q6h prn anxiety    ##. Multiple subacute-chronic CVAs: Work up at last admission with MRI Brain showing multiple areas of infarction of various ages. MRI C Spine with multilevel high grade bilateral neural foraminal narrowing which may impinge on exiting nerve roots. Neurologic exam normal except for decreased sensation in left 4th and 5th fingers. Per Neurology this may be related to focal ulnar nerve issues. LDL elevated otherwise normal A1c, TTE, carotid artery ultrasound. Loaded with aspirin and then started on high intensity statin and daily low-dose aspirin and daily plavix.    - Neurology follow-up clinic visit on 12/21.    - Continue ASA 81mg daily + Plavix 75mg daily for 90d followed by monotherapy w/ ASA 81mg daily  - Atorvastatin 40mg daily  - On a 14 day Ziopatch monitor to rule out arrhythmias as a potential cause.     ##. Left-sided 4th, 5th Finger Numbness: per previous Neurology note, not consistent with stroke syndromes related to lesion seen on head imaging.  More likely to be peripheral neuropathy.    - F/u Neurology clinic visit  - Consider Neurosurgery referral       Diet:   Regular diet  DVT Prophylaxis: expect short observation stay  Bingham Catheter: not present  Code Status:  full code         Disposition Plan   Expected discharge: Tomorrow, recommended to prior living arrangement once blood pressure less than 160/90.  Entered: THUY Larson 12/12/2020, 2:14 AM     The patient's care was discussed with the Attending Physician, Dr. Garvin.    THUY Larson  [facility]   Contact information available via [facility] Paging/Directory      ______________________________________________________________________    Chief Complaint    Hypertension     History is obtained from the patient    History of Present Illness   Braulio Guardado is a 65 year old male with a history of anxiety, PTSD, recent diagnosis of HTN, subacute-chronic CVAs, who presented to the ED with elevated BP. Patient was just admitted from 12/9/20 to 12/11/20 and discharged at about 2PM yesterday. Patient states he bought a BP monitor and took his BP last evening which was running over 200's.    Per chart review patient presented to the ED on 12/9/20 with left arm numbness. Prior to the arrival to [facility] ED, patient saw [facility] due to complaints of numbness and tingling in his left hand located primarily in the 5th, 4th digit for the last few months. At that appointment, they found him to be significantly hypertensive with BPs 220's/140's. EKG performed in clinic reportedly showed sinus tachycardia w/o ST changes and was then sent to the ED from clinic for further evaluation/management. He was treated with both IV hydralazine and IV labetalol. Amlodipine 5mg started in the hospital. MRI Brain showed multiple subacute-chronic CVA's. Left sided finger numbness not consistent with stroke syndromes related to lesion seen on head imaging.  More likely to be peripheral neuropathy. He was admitted to the Medicine Team until 12/11/20. . Work up included A1c normal, TTE unremarkable, carotid artery ultrasound normal.  Loaded with aspirin and then started on high intensity statin and daily low-dose aspirin and daily plavix (x 9 days).  Neurology consulted in the hospital and will f/u in OP clinic on 12/21. He was also discharged on a 14 day Ziopatch monitor to rule out arrhythmias as a potential cause.    Reports recent weight loss due to poor appetite. Reports increased anxiety recently however no recent cause for increased stress. Denies any chest pain, LE edema, SOB, headache, changes in vision, nausea, vomiting, abdominal pain, cough, fever, chills. Denies any illicit drug  use, alcohol use or tobacco use.     In the ED, HR 60's-80's, 's-200's/, RR 18, SaO2 94-97% on RA, Temp 97.6. Labs show CMP with K 3.3, glucose 119 otherwise normal. Normal CBC. UA with 10 protein, trace blood, 1 WBC, 4 RBC, 3 hyaline casts, few calcium oxalate.     CTA Head & Neck (12/9/20): Negative    MRI Brain on (12/10/12): 1.  Suspect multifocal evolving late subacute to chronic infarcts in the right frontoparietal junction and occipital lobe with laminar necrosis. Given the presence of enhancement, recommend follow-up in six to eight weeks to ensure resolution and expected evolution and exclude underlying mass lesion.  2.  Likely subacute infarct in the right centrum semiovale, measuring 7 mm in diameter. There is no associated hemorrhage or enhancement, and the infarct is likely more recent than the other areas of infarction described above.  3.  Sequelae of chronic microvascular ischemic disease and generalized parenchymal volume loss.    MRI C Spine (12/10/20): 1.  Multilevel degenerative changes of the cervical spine, without high-grade spinal canal stenosis.  2.  Multilevel high-grade bilateral neural foraminal narrowing, which may impinge upon the exiting nerve roots.    In the ED the patient was given Amlodipine 5 mg po x 1. Apparently patient was very uncomfortable being discharged home. He is being admitted for BP management and Psychiatry consult to address his anxiety.     Review of Systems    All other ROS negative except those mentioned in above note.      Past Medical History    I have reviewed this patient's medical history and updated it with pertinent information if needed.   Past Medical History:   Diagnosis Date     Cerebral infarction (H)      Hyperlipidemia      Hypertension        Past Surgical History   I have reviewed this patient's surgical history and updated it with pertinent information if needed.  History reviewed. No pertinent surgical history.    Social History   I  have reviewed this patient's social history and updated it with pertinent information if needed.  Social History     Tobacco Use     Smoking status: Never Smoker     Smokeless tobacco: Never Used   Substance Use Topics     Alcohol use: Never     Frequency: Never     Drug use: Yes     Types: Marijuana       Family History         Prior to Admission Medications   Prior to Admission Medications   Prescriptions Last Dose Informant Patient Reported? Taking?   amLODIPine (NORVASC) 5 MG tablet   No No   Sig: Take 1 tablet (5 mg) by mouth daily   aspirin (ASA) 81 MG chewable tablet   No No   Sig: Take 1 tablet (81 mg) by mouth daily   atorvastatin (LIPITOR) 40 MG tablet   No No   Sig: Take 1 tablet (40 mg) by mouth every evening   clopidogrel (PLAVIX) 75 MG tablet   No No   Sig: Take 1 tablet (75 mg) by mouth daily      Facility-Administered Medications: None     Allergies   No Known Allergies    Physical Exam   Vital Signs: Temp: 97.6  F (36.4  C) Temp src: Oral BP: (!) 157/97 Pulse: 66   Resp: 18 SpO2: 96 % O2 Device: None (Room air)    Weight: 200 lbs 0 oz    Constitutional: awake, alert, cooperative, no apparent distress, and appears stated age  Eyes: Lids and lashes normal, pupils equal, round and reactive to light, extra ocular muscles intact, sclera clear, conjunctiva normal  ENT: Normocephalic, without obvious abnormality, atraumatic, sinuses nontender on palpation, external ears without lesions, oral pharynx with moist mucous membranes, tonsils without erythema or exudates, gums normal and good dentition.  Hematologic / Lymphatic: no cervical lymphadenopathy  Respiratory: No increased work of breathing, good air exchange, clear to auscultation bilaterally, no crackles or wheezing  Cardiovascular: Normal apical impulse, regular rate and rhythm, normal S1 and S2, no S3 or S4, and no murmur noted  GI: No scars, normal bowel sounds, soft, non-distended, non-tender, no masses palpated, no hepatosplenomegally  Skin: no  bruising or bleeding  Musculoskeletal: There is no redness, warmth, or swelling of the joints.  Full range of motion noted.  Motor strength is 5 out of 5 all extremities bilaterally.  Tone is normal.  Neurologic: Awake, alert, oriented to name, place and time.  Cranial nerves II-XII are grossly intact.  Motor is 5 out of 5 bilaterally.  Cerebellar finger to nose, heel to shin intact.  Sensory is intact.  Babinski down going, Romberg negative, and gait is normal.  Neuropsychiatric: General: normal, calm and normal eye contact    Data   Data reviewed today: I reviewed all medications, new labs and imaging results over the last 24 hours.   Recent Labs   Lab 12/11/20  2225 12/11/20  0435 12/10/20  0720 12/09/20  1709   WBC 8.3 7.6  --  8.8   HGB 16.5 15.6  --  16.5   MCV 88 90  --  88    243  --  271   INR  --   --   --  1.04    142 139 140   POTASSIUM 3.3* 3.7 3.4 3.2*   CHLORIDE 108 108 107 107   CO2 25 28 25 24   BUN 19 17 12 14   CR 0.90 1.04 0.82 0.90   ANIONGAP 7 6 7 9   ALVINA 9.2 9.2 9.3 9.2   * 99 128* 106*   ALBUMIN 3.9  --   --  3.9   PROTTOTAL 7.5  --   --  7.5   BILITOTAL 0.7  --   --  0.6   ALKPHOS 102  --   --  101   ALT 34  --   --  25   AST 20  --   --  15   TROPI  --   --   --  <0.015     Most Recent 3 CBC's:  Recent Labs   Lab Test 12/11/20 2225 12/11/20 0435 12/09/20  1709   WBC 8.3 7.6 8.8   HGB 16.5 15.6 16.5   MCV 88 90 88    243 271     Most Recent 3 BMP's:  Recent Labs   Lab Test 12/11/20  2225 12/11/20  0435 12/10/20  0720    142 139   POTASSIUM 3.3* 3.7 3.4   CHLORIDE 108 108 107   CO2 25 28 25   BUN 19 17 12   CR 0.90 1.04 0.82   ANIONGAP 7 6 7   ALVINA 9.2 9.2 9.3   * 99 128*     Most Recent 2 LFT's:  Recent Labs   Lab Test 12/11/20  2225 12/09/20  1709   AST 20 15   ALT 34 25   ALKPHOS 102 101   BILITOTAL 0.7 0.6     No results found for this or any previous visit (from the past 24 hour(s)).

## 2020-12-12 NOTE — PLAN OF CARE
"   Observation goals PRIOR TO DISCHARGE    Comments:     -diagnostic tests and consults completed and resulted: no/pending   and Pscyh consult in AM     -vital signs normal or at patient baseline: no/pending  Blood pressure (!) 175/105, pulse 71, temperature 98.2  F (36.8  C), temperature source Oral, resp. rate 16, height 1.803 m (5' 11\"), weight 92 kg (202 lb 13.2 oz), SpO2 98 %.       -tolerating oral intake to maintain hydration: yes     -BP improved: yes     -Psych consult has been completed: no/pending     -returns to baseline functional status: yes     -safe disposition plan has been identified: yes         "

## 2020-12-13 VITALS
RESPIRATION RATE: 20 BRPM | HEART RATE: 83 BPM | BODY MASS INDEX: 27.9 KG/M2 | SYSTOLIC BLOOD PRESSURE: 148 MMHG | OXYGEN SATURATION: 98 % | DIASTOLIC BLOOD PRESSURE: 89 MMHG | TEMPERATURE: 99.2 F | HEIGHT: 71 IN | WEIGHT: 199.3 LBS

## 2020-12-13 LAB
ANION GAP SERPL CALCULATED.3IONS-SCNC: 5 MMOL/L (ref 3–14)
BUN SERPL-MCNC: 17 MG/DL (ref 7–30)
CALCIUM SERPL-MCNC: 9 MG/DL (ref 8.5–10.1)
CHLORIDE SERPL-SCNC: 109 MMOL/L (ref 94–109)
CO2 SERPL-SCNC: 25 MMOL/L (ref 20–32)
CREAT SERPL-MCNC: 0.94 MG/DL (ref 0.66–1.25)
ERYTHROCYTE [DISTWIDTH] IN BLOOD BY AUTOMATED COUNT: 12.2 % (ref 10–15)
GFR SERPL CREATININE-BSD FRML MDRD: 86 ML/MIN/{1.73_M2}
GLUCOSE SERPL-MCNC: 93 MG/DL (ref 70–99)
HCT VFR BLD AUTO: 46.4 % (ref 40–53)
HGB BLD-MCNC: 15.6 G/DL (ref 13.3–17.7)
MAGNESIUM SERPL-MCNC: 2.5 MG/DL (ref 1.6–2.3)
MCH RBC QN AUTO: 30.1 PG (ref 26.5–33)
MCHC RBC AUTO-ENTMCNC: 33.6 G/DL (ref 31.5–36.5)
MCV RBC AUTO: 90 FL (ref 78–100)
PLATELET # BLD AUTO: 257 10E9/L (ref 150–450)
POTASSIUM SERPL-SCNC: 3.9 MMOL/L (ref 3.4–5.3)
RBC # BLD AUTO: 5.18 10E12/L (ref 4.4–5.9)
SODIUM SERPL-SCNC: 140 MMOL/L (ref 133–144)
WBC # BLD AUTO: 7.9 10E9/L (ref 4–11)

## 2020-12-13 PROCEDURE — 83835 ASSAY OF METANEPHRINES: CPT | Performed by: PHYSICIAN ASSISTANT

## 2020-12-13 PROCEDURE — 83735 ASSAY OF MAGNESIUM: CPT | Performed by: PHYSICIAN ASSISTANT

## 2020-12-13 PROCEDURE — 99217 PR OBSERVATION CARE DISCHARGE: CPT | Mod: Z6 | Performed by: NURSE PRACTITIONER

## 2020-12-13 PROCEDURE — 250N000013 HC RX MED GY IP 250 OP 250 PS 637: Performed by: PHYSICIAN ASSISTANT

## 2020-12-13 PROCEDURE — 85027 COMPLETE CBC AUTOMATED: CPT | Performed by: PHYSICIAN ASSISTANT

## 2020-12-13 PROCEDURE — 36415 COLL VENOUS BLD VENIPUNCTURE: CPT | Performed by: PHYSICIAN ASSISTANT

## 2020-12-13 PROCEDURE — G0378 HOSPITAL OBSERVATION PER HR: HCPCS

## 2020-12-13 PROCEDURE — 80048 BASIC METABOLIC PNL TOTAL CA: CPT | Performed by: PHYSICIAN ASSISTANT

## 2020-12-13 RX ORDER — HYDROXYZINE HYDROCHLORIDE 25 MG/1
25 TABLET, FILM COATED ORAL EVERY 6 HOURS PRN
Qty: 20 TABLET | Refills: 0 | Status: ON HOLD | OUTPATIENT
Start: 2020-12-13 | End: 2020-12-25

## 2020-12-13 RX ORDER — LISINOPRIL 10 MG/1
10 TABLET ORAL DAILY
Qty: 30 TABLET | Refills: 0 | Status: ON HOLD | OUTPATIENT
Start: 2020-12-14 | End: 2020-12-17

## 2020-12-13 RX ADMIN — ASPIRIN 81 MG CHEWABLE TABLET 81 MG: 81 TABLET CHEWABLE at 08:30

## 2020-12-13 RX ADMIN — LISINOPRIL 10 MG: 5 TABLET ORAL at 08:30

## 2020-12-13 RX ADMIN — CLOPIDOGREL BISULFATE 75 MG: 75 TABLET ORAL at 08:30

## 2020-12-13 RX ADMIN — HYDROXYZINE HYDROCHLORIDE 25 MG: 25 TABLET, FILM COATED ORAL at 12:09

## 2020-12-13 RX ADMIN — ESCITALOPRAM 5 MG: 5 TABLET, FILM COATED ORAL at 08:31

## 2020-12-13 ASSESSMENT — MIFFLIN-ST. JEOR: SCORE: 1721.13

## 2020-12-13 ASSESSMENT — ENCOUNTER SYMPTOMS
ACTIVITY IMPAIRMENT: NORMAL
DIETARY ISSUES: ADEQUATE INTAKE
NO PATIENT REPORTED PAIN: 1

## 2020-12-13 NOTE — CONSULTS
Care Management Discharge Note    Discharge Date: 12/13/20       Discharge Disposition: Home    Discharge Services:      Discharge DME:      Discharge Transportation: family or friend will provide;car, drives self    Private pay costs discussed: Not applicable    Patient/family educated on Medicare website which has current facility and service quality ratings:  NA    Education Provided on the Discharge Plan:  RNCC educated patient on discharge follow up and home care referral.   Persons Notified of Discharge Plans: Patient, care team.   Patient/Family in Agreement with the Plan:  Yes    Handoff Referral Completed: Yes    Additional Information:  RNCC spoke with patient about follow up home care and Primary Care Provider. Patient has requested a PCP in the [facility] system. RNCC was able to get PCP apt arranged and home care referral sent to [place].     Patient has PCP apt on Dec 17th, 2020 at:   [facility]   [address] [id number]      Patient will see Dr. Gillespie for initial visit then patient would like to change to see Raven Sorensen NP at the same clinic.   RNCC advised patient to schedule with Raven on Thursday when he is at the clinic. Clinic number: [phone]      ROWENA Singh., BSN., RN  Nurse Care Coordinator    [facility]  Care Management  [address] [place] [id number]  [email or web address]  Employed by [facility]
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
## 2020-12-13 NOTE — PROGRESS NOTES
"Braulio Guardado is a 65 year old male patient.  1. Essential hypertension    2. Anxiety      Past Medical History:   Diagnosis Date     Cerebral infarction (H)      Hyperlipidemia      Hypertension      No current outpatient medications on file.     No Known Allergies  Active Problems:    Anxiety    Essential hypertension    Blood pressure (!) 142/88, pulse 70, temperature 98.6  F (37  C), temperature source Oral, resp. rate 20, height 1.803 m (5' 11\"), weight 90.4 kg (199 lb 4.7 oz), SpO2 94 %.    Subjective:  Symptoms:  Resolved.  (But now flushing sensation).    Diet:  Adequate intake.    Activity level: Normal.    Pain:  He reports no pain.      Objective:  General Appearance:  Comfortable.    Vital signs: (most recent): Blood pressure (!) 142/88, pulse 70, temperature 98.6  F (37  C), temperature source Oral, resp. rate 20, height 1.803 m (5' 11\"), weight 90.4 kg (199 lb 4.7 oz), SpO2 94 %.  Vital signs are normal.    Output: Producing urine.    HEENT: Normal HEENT exam.    Lungs:  Normal effort and normal respiratory rate.  Breath sounds clear to auscultation.    Heart: Normal rate.  Regular rhythm.  S1 normal and S2 normal.    Abdomen: Abdomen is soft.  Bowel sounds are normal.   There is no abdominal tenderness.     Extremities: Normal range of motion.    Pulses: Distal pulses are intact.    Neurological: Patient is alert.    Pupils:  Pupils are equal, round, and reactive to light.    Skin:  Warm.      Assessment:    Condition: In stable condition.  Improving.   (65 yom with new onset hypertensive urgency of unclear etiology. CVA work up showed MRI pos for subacute-chronic CVAs without significant neuro deficits. Resting echo and Carotid us neg. But no stress test-possibly this am otherwise as outpatient.    ?reaction to lexapro-hold as he had SI with prozac.    Will plan to discharge with lisinopril but no amlodipine.).       Dhaval Hamilton MD, MD  12/13/2020    The pt was seen and examined by myself. The " case was reviewed and the plan was discussed with the HI.

## 2020-12-13 NOTE — PLAN OF CARE
"-diagnostic tests and consults completed and resulted- met  -vital signs normal or at patient baseline- met, BP (!) 123/90 (BP Location: Left arm)   Pulse 60   Temp 98.4  F (36.9  C) (Oral)   Resp 16   Ht 1.803 m (5' 11\")   Wt 92 kg (202 lb 13.2 oz)   SpO2 97%   BMI 28.29 kg/m      -tolerating oral intake to maintain hydration- met  -BP improved- met  -Psych consult has been completed- met  -returns to baseline functional status- met  -safe disposition plan has been identified- met  "

## 2020-12-13 NOTE — PLAN OF CARE
"-diagnostic tests and consults completed and resulted- met  -vital signs normal or at patient baseline- met, BP (!) 148/88 (BP Location: Left arm)   Pulse 60   Temp 98.4  F (36.9  C) (Oral)   Resp 16   Ht 1.803 m (5' 11\")   Wt 92 kg (202 lb 13.2 oz)   SpO2 97%   BMI 28.29 kg/m      -tolerating oral intake to maintain hydration- met  -BP improved- met  -Psych consult has been completed- met  -returns to baseline functional status- met  -safe disposition plan has been identified- met    24 hour urine collection in process. Finish collecting at 1300 on 12/13/2020   "

## 2020-12-13 NOTE — PLAN OF CARE
"Observation Goals:  -diagnostic tests and consults completed and resulted- met  -vital signs normal or at patient baseline- met, BP (!) 142/88 (BP Location: Left arm)   Pulse 70   Temp 98.6  F (37  C) (Oral)   Resp 20   Ht 1.803 m (5' 11\")   Wt 90.4 kg (199 lb 4.7 oz)   SpO2 94%   BMI 27.80 kg/m    -tolerating oral intake to maintain hydration- met  -BP improved- met  -Psych consult has been completed- met  -returns to baseline functional status- met  -safe disposition plan has been identified- met     24 hour urine collection in process. Finish collecting at 1300 on 12/13/20  "

## 2020-12-13 NOTE — PROGRESS NOTES
Discharge orders received. IV removed, belongings returned,Pt PTA medications returned from security, medications picked up from discharge pharmacy. Discharge instructions and orders reviewed with patient who verbalized understanding. Pt was escorted out to vehicle.

## 2020-12-13 NOTE — PLAN OF CARE
Observation Goals:  -diagnostic tests and consults completed and resulted- met  -vital signs normal or at patient baseline- met,  -tolerating oral intake to maintain hydration- met  -BP improved- met  -Psych consult has been completed- met  -returns to baseline functional status- met  -safe disposition plan has been identified- met
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
## 2020-12-14 ENCOUNTER — HOSPITAL ENCOUNTER (EMERGENCY)
Facility: CLINIC | Age: 65
Discharge: HOME | End: 2020-12-14
Attending: EMERGENCY MEDICINE | Admitting: EMERGENCY MEDICINE
Payer: COMMERCIAL

## 2020-12-14 VITALS
DIASTOLIC BLOOD PRESSURE: 120 MMHG | HEART RATE: 94 BPM | OXYGEN SATURATION: 96 % | RESPIRATION RATE: 16 BRPM | SYSTOLIC BLOOD PRESSURE: 181 MMHG | TEMPERATURE: 98.6 F

## 2020-12-14 DIAGNOSIS — I10 ESSENTIAL HYPERTENSION: ICD-10-CM

## 2020-12-14 LAB
ANION GAP SERPL CALCULATED.3IONS-SCNC: 6 MMOL/L (ref 3–14)
BASOPHILS # BLD AUTO: 0 10E9/L (ref 0–0.2)
BASOPHILS NFR BLD AUTO: 0.3 %
BUN SERPL-MCNC: 13 MG/DL (ref 7–30)
CALCIUM SERPL-MCNC: 8.8 MG/DL (ref 8.5–10.1)
CHLORIDE SERPL-SCNC: 108 MMOL/L (ref 94–109)
CO2 SERPL-SCNC: 27 MMOL/L (ref 20–32)
CREAT SERPL-MCNC: 0.89 MG/DL (ref 0.66–1.25)
DIFFERENTIAL METHOD BLD: NORMAL
EOSINOPHIL # BLD AUTO: 0 10E9/L (ref 0–0.7)
EOSINOPHIL NFR BLD AUTO: 0.3 %
ERYTHROCYTE [DISTWIDTH] IN BLOOD BY AUTOMATED COUNT: 11.9 % (ref 10–15)
GFR SERPL CREATININE-BSD FRML MDRD: >90 ML/MIN/{1.73_M2}
GLUCOSE SERPL-MCNC: 98 MG/DL (ref 70–99)
HCT VFR BLD AUTO: 48.6 % (ref 40–53)
HGB BLD-MCNC: 16.7 G/DL (ref 13.3–17.7)
IMM GRANULOCYTES # BLD: 0 10E9/L (ref 0–0.4)
IMM GRANULOCYTES NFR BLD: 0.2 %
LYMPHOCYTES # BLD AUTO: 1.9 10E9/L (ref 0.8–5.3)
LYMPHOCYTES NFR BLD AUTO: 18.6 %
MCH RBC QN AUTO: 30.2 PG (ref 26.5–33)
MCHC RBC AUTO-ENTMCNC: 34.4 G/DL (ref 31.5–36.5)
MCV RBC AUTO: 88 FL (ref 78–100)
MONOCYTES # BLD AUTO: 0.8 10E9/L (ref 0–1.3)
MONOCYTES NFR BLD AUTO: 7.7 %
NEUTROPHILS # BLD AUTO: 7.3 10E9/L (ref 1.6–8.3)
NEUTROPHILS NFR BLD AUTO: 72.9 %
NRBC # BLD AUTO: 0 10*3/UL
NRBC BLD AUTO-RTO: 0 /100
PLATELET # BLD AUTO: 258 10E9/L (ref 150–450)
POTASSIUM SERPL-SCNC: 3.4 MMOL/L (ref 3.4–5.3)
RBC # BLD AUTO: 5.53 10E12/L (ref 4.4–5.9)
SODIUM SERPL-SCNC: 141 MMOL/L (ref 133–144)
TROPONIN I SERPL-MCNC: <0.015 UG/L (ref 0–0.04)
WBC # BLD AUTO: 10 10E9/L (ref 4–11)

## 2020-12-14 PROCEDURE — 99284 EMERGENCY DEPT VISIT MOD MDM: CPT | Mod: 25 | Performed by: EMERGENCY MEDICINE

## 2020-12-14 PROCEDURE — 93005 ELECTROCARDIOGRAM TRACING: CPT | Performed by: EMERGENCY MEDICINE

## 2020-12-14 PROCEDURE — 250N000013 HC RX MED GY IP 250 OP 250 PS 637: Performed by: EMERGENCY MEDICINE

## 2020-12-14 PROCEDURE — 84484 ASSAY OF TROPONIN QUANT: CPT | Performed by: EMERGENCY MEDICINE

## 2020-12-14 PROCEDURE — 99284 EMERGENCY DEPT VISIT MOD MDM: CPT | Performed by: EMERGENCY MEDICINE

## 2020-12-14 PROCEDURE — 93010 ELECTROCARDIOGRAM REPORT: CPT | Performed by: EMERGENCY MEDICINE

## 2020-12-14 PROCEDURE — 85025 COMPLETE CBC W/AUTO DIFF WBC: CPT | Performed by: EMERGENCY MEDICINE

## 2020-12-14 PROCEDURE — 80048 BASIC METABOLIC PNL TOTAL CA: CPT | Performed by: EMERGENCY MEDICINE

## 2020-12-14 RX ORDER — HYDROXYZINE HYDROCHLORIDE 25 MG/1
25 TABLET, FILM COATED ORAL ONCE
Status: COMPLETED | OUTPATIENT
Start: 2020-12-14 | End: 2020-12-14

## 2020-12-14 RX ADMIN — HYDROXYZINE HYDROCHLORIDE 25 MG: 25 TABLET, FILM COATED ORAL at 18:18

## 2020-12-14 ASSESSMENT — ENCOUNTER SYMPTOMS
DIZZINESS: 0
SHORTNESS OF BREATH: 0
HEADACHES: 0
AGITATION: 1
NERVOUS/ANXIOUS: 1

## 2020-12-14 ASSESSMENT — VISUAL ACUITY: OU: NORMAL ACUITY

## 2020-12-14 NOTE — ED AVS SNAPSHOT
[facility] Emergency Department  [address] [address]  Phone: [phone]                                    Braulio Guardado   MRN: [id number]    Department: [facility] Emergency Department   Date of Visit: 12/14/2020           After Visit Summary Signature Page    I have received my discharge instructions, and my questions have been answered. I have discussed any challenges I see with this plan with the nurse or doctor.    ..........................................................................................................................................  Patient/Patient Representative Signature      ..........................................................................................................................................  Patient Representative Print Name and Relationship to Patient    ..................................................               ................................................  Date                                   Time    ..........................................................................................................................................  Reviewed by Signature/Title    ...................................................              ..............................................  Date                                               Time          22EPIC Rev 08/18

## 2020-12-14 NOTE — ED TRIAGE NOTES
"Pt presents to ED with c/o HTN. Pt stated SBP was 214 at home. Denies headache, dizziness, or visual changes. Pt stated that he recently had \"mini strokes\"  Jeannine Contreras RN on 12/14/2020 at 5:19 PM    "

## 2020-12-14 NOTE — ED PROVIDER NOTES
"    [place] EMERGENCY DEPARTMENT ([facility])  12/14/20  History     Chief Complaint   Patient presents with     Hypertension     The history is provided by the patient and medical records.     Braulio Guardado is a 65 year old male with a medical history signficant for PTSD, depression,  HTN, HLD, and h/x of cerebral infarction who presents to the Emergency Department for evaluation of hypertension.  Patient left [facility] yesterday at approximately 5PM.  Patient states that he had a slight elevated BP reading at night; however, decided to wait a day as he felt fine.  Patient took a BP reading this morning which was high (185/110) and, therefore, he took his blood pressure medications (Zestril and Plavix) and aspirin.  At approximately 4PM, patient reports that he had a minor panic attack, which has not been a common occurrence for the past year, and took his anti-anxiety medication.  Patient also reports a 224/112 blood pressure reading around 4PM and, therefore, is present at the ED today due to concerns of prior h/x of strokes.  Patient states that he was feeling agitated while taking the reading and could feel a \"surge of tightness in the neck and arm\".   Patient reports that he is a little wobbly and \"doesn't feel good\".  Patient denies any shortness of breath, headaches, or dizziness.  Patient states that he has an appointment scheduled with his PCP on Wednesday (12/16/2020) and with neurology next week.      Per chart review, patient was admitted to [facility] ED from 12/09 to 12/11/2020 due to hypertensive urgency an subacute-chronic CVA's; patient's chest x-rays were unremarkable and he was discharged with Amlodipine (5mg, daily). Patient returned to the ED and was hospitalized from 12/11 to 12/13/2020 due to elevated BPs over the 200's; patient was placed on Lisinopril and his symptoms improved; patient was discharged in stable conditions.   EXAM: MR BRAIN W/O and W CONTRAST  LOCATION: [facility] " Services  DATE/TIME: 12/10/2020 5:41 AM  IMPRESSION:  1.  Suspect multifocal evolving late subacute to chronic infarcts in the right frontoparietal junction and occipital lobe with laminar necrosis. Given the presence of enhancement, recommend follow-up in six to eight weeks to ensure resolution and expected evolution and exclude underlying mass lesion.  2.  Likely subacute infarct in the right centrum semiovale, measuring 7 mm in diameter. There is no associated hemorrhage or enhancement, and the infarct is likely more recent than the other areas of infarction described above.  3.  Sequelae of chronic microvascular ischemic disease and generalized parenchymal volume loss.    I have reviewed the Medications, Allergies, Past Medical and Surgical History, and Social History in the Qui[employer] system.  PAST MEDICAL HISTORY:   Past Medical History:   Diagnosis Date     Cerebral infarction (H)      Hyperlipidemia      Hypertension        PAST SURGICAL HISTORY: History reviewed. No pertinent surgical history.    Past medical history, past surgical history, medications, and allergies were reviewed with the patient. Additional pertinent items: None    FAMILY HISTORY: History reviewed. No pertinent family history.    SOCIAL HISTORY:   Social History     Tobacco Use     Smoking status: Never Smoker     Smokeless tobacco: Never Used   Substance Use Topics     Alcohol use: Never     Frequency: Never     Social history was reviewed with the patient. Additional pertinent items: None      Discharge Medication List as of 12/14/2020  7:46 PM      CONTINUE these medications which have NOT CHANGED    Details   aspirin (ASA) 81 MG chewable tablet Take 1 tablet (81 mg) by mouth daily, Disp-30 tablet, R-1, E-Prescribe      atorvastatin (LIPITOR) 40 MG tablet Take 1 tablet (40 mg) by mouth every evening, Disp-30 tablet, R-1, E-Prescribe      clopidogrel (PLAVIX) 75 MG tablet Take 1 tablet (75 mg) by mouth daily, Disp-90 tablet, R-0, E-Prescribe       hydrOXYzine (ATARAX) 25 MG tablet Take 1 tablet (25 mg) by mouth every 6 hours as needed for other (adjuvant pain), Disp-20 tablet, R-0, E-Prescribe      lisinopril (ZESTRIL) 10 MG tablet Take 1 tablet (10 mg) by mouth daily, Disp-30 tablet, R-0, E-Prescribe              No Known Allergies     Review of Systems   Respiratory: Negative for shortness of breath.    Cardiovascular:        Positive for hypertensive   Neurological: Negative for dizziness and headaches.   Psychiatric/Behavioral: Positive for agitation. The patient is nervous/anxious.      A complete review of systems was performed with pertinent positives and negatives noted in the HPI, and all other systems negative.    Physical Exam   BP: (!) 207/118  Pulse: 115  Temp: 98.6  F (37  C)  Resp: 18  SpO2: 94 %      Physical Exam  Gen:A&Ox3, no acute distress, anxious  HEENT:PERRL, no facial tenderness or wounds, head atraumatic  CV:RRR without murmurs  PULM:Clear to auscultation bilaterally  Abd:soft, nontender, nondistended. Bowel sounds present and normal  UE:No traumatic injuries, skin normal  LE:no traumatic injuries, skin normal  Neuro:CN II-XII intact, strength 5/5 of flexion and extension of the toes, ankles, knees, hips, hands, wrists, elbows and shoulders.  Sensation intact to touch throughout other than mild chronic sensation decrease in left forearm. Coordination normal on finger to nose testing. Reflexes 3/6 and symmetric throughout. No clonus.  Gait normal. Able to walk on heels and toes.  Negative Romberg sign.     Skin: no rashes or ecchymoses    ED Course        Procedures         5:44 PM  The patient was seen and examined by Mari Fernandez MD in Room ED28.          EKG Interpretation:      Interpreted by Mari Fernandez MD  Time reviewed: 6:04 PM  Symptoms at time of EKG: hypertension   Rhythm: normal sinus   Rate:89 bpm  Axis: Normal  Ectopy: none  Conduction: normal  ST Segments/ T Waves: LVH  Q Waves: none  Comparison to prior:  Unchanged from 12/11/2020    Clinical Impression: NSR with LVH                    No results found for this or any previous visit (from the past 24 hour(s)).  Medications   hydrOXYzine (ATARAX) tablet 25 mg (25 mg Oral Given 12/14/20 1818)             Assessments & Plan (with Medical Decision Making)   64 yo M presenting with elevated blood pressure.   Pt recently discharged from the hospital twice due to subacute CVA and return observation stay for hypertension.   /118 on arrival. Pt is asymptomatic, other than feeling anxious about the readings.   Physical exam without new signs of stroke, or other end organ injuries.   Reports taking BP meds today as instructed.   IV access obtained and lab testing done.   EKG shows NSR with LVH, unchanged from previous  CBC, BMP unremarkable. Troponin negative.   Pt was treated with a dose of hydroxyzine for anxiety. Has he calmed his BP reduced to 145/97.   He and I discussed a plan for managing anxiety at home. He declines referral for DEC assessment. Will have him increase hydroxyzine to 50mg PRN.   Pt also has room to increase his dose of lisinopril. Increased from 10mg daily to 20mg daily.   Has follow up scheduled with primary care on Wednesday.   Return instructions reviewed. Discharged.     I have reviewed the nursing notes.    I have reviewed the findings, diagnosis, plan and need for follow up with the patient.    Discharge Medication List as of 12/14/2020  7:46 PM          Final diagnoses:   Essential hypertension     IRin, am serving as a trained medical scribe to document services personally performed by Mari Fernandez MD, based on the provider's statements to me.     I, Mari Fernandez MD, was physically present and have reviewed and verified the accuracy of this note documented by Rin aSnchez.    Mari Fernandez MD FACEP  12/14/2020   [facility] ED EMERGENCY DEPARTMENT     Mari Fernandez MD  12/17/20 1100

## 2020-12-15 ENCOUNTER — HOSPITAL ENCOUNTER (INPATIENT)
Facility: CLINIC | Age: 65
LOS: 2 days | Discharge: HOME | End: 2020-12-17
Attending: EMERGENCY MEDICINE | Admitting: INTERNAL MEDICINE
Payer: COMMERCIAL

## 2020-12-15 DIAGNOSIS — Z20.828 EXPOSURE TO SARS-ASSOCIATED CORONAVIRUS: ICD-10-CM

## 2020-12-15 DIAGNOSIS — I10 BENIGN ESSENTIAL HYPERTENSION: ICD-10-CM

## 2020-12-15 DIAGNOSIS — I16.0 HYPERTENSIVE URGENCY: ICD-10-CM

## 2020-12-15 DIAGNOSIS — F41.9 ANXIETY: Primary | ICD-10-CM

## 2020-12-15 LAB
ANION GAP SERPL CALCULATED.3IONS-SCNC: 5 MMOL/L (ref 3–14)
BASOPHILS # BLD AUTO: 0 10E9/L (ref 0–0.2)
BASOPHILS NFR BLD AUTO: 0.4 %
BUN SERPL-MCNC: 15 MG/DL (ref 7–30)
CALCIUM SERPL-MCNC: 8.8 MG/DL (ref 8.5–10.1)
CHLORIDE SERPL-SCNC: 109 MMOL/L (ref 94–109)
CO2 SERPL-SCNC: 26 MMOL/L (ref 20–32)
CORTIS SERPL-MCNC: 11.2 UG/DL (ref 4–22)
CREAT SERPL-MCNC: 0.81 MG/DL (ref 0.66–1.25)
DIFFERENTIAL METHOD BLD: NORMAL
EOSINOPHIL # BLD AUTO: 0 10E9/L (ref 0–0.7)
EOSINOPHIL NFR BLD AUTO: 0.3 %
ERYTHROCYTE [DISTWIDTH] IN BLOOD BY AUTOMATED COUNT: 12.1 % (ref 10–15)
FLUAV+FLUBV RNA SPEC QL NAA+PROBE: NEGATIVE
FLUAV+FLUBV RNA SPEC QL NAA+PROBE: NEGATIVE
GFR SERPL CREATININE-BSD FRML MDRD: >90 ML/MIN/{1.73_M2}
GLUCOSE SERPL-MCNC: 111 MG/DL (ref 70–99)
HCT VFR BLD AUTO: 47.3 % (ref 40–53)
HGB BLD-MCNC: 16.1 G/DL (ref 13.3–17.7)
IMM GRANULOCYTES # BLD: 0 10E9/L (ref 0–0.4)
IMM GRANULOCYTES NFR BLD: 0.4 %
INTERPRETATION ECG - MUSE: NORMAL
INTERPRETATION ECG - MUSE: NORMAL
LABORATORY COMMENT REPORT: NORMAL
LYMPHOCYTES # BLD AUTO: 1.4 10E9/L (ref 0.8–5.3)
LYMPHOCYTES NFR BLD AUTO: 17.2 %
MCH RBC QN AUTO: 30 PG (ref 26.5–33)
MCHC RBC AUTO-ENTMCNC: 34 G/DL (ref 31.5–36.5)
MCV RBC AUTO: 88 FL (ref 78–100)
MONOCYTES # BLD AUTO: 0.6 10E9/L (ref 0–1.3)
MONOCYTES NFR BLD AUTO: 7.6 %
NEUTROPHILS # BLD AUTO: 5.8 10E9/L (ref 1.6–8.3)
NEUTROPHILS NFR BLD AUTO: 74.1 %
NRBC # BLD AUTO: 0 10*3/UL
NRBC BLD AUTO-RTO: 0 /100
PLATELET # BLD AUTO: 249 10E9/L (ref 150–450)
POTASSIUM SERPL-SCNC: 3.7 MMOL/L (ref 3.4–5.3)
RBC # BLD AUTO: 5.36 10E12/L (ref 4.4–5.9)
RSV RNA SPEC QL NAA+PROBE: NEGATIVE
SARS-COV-2 RNA SPEC QL NAA+PROBE: NEGATIVE
SODIUM SERPL-SCNC: 140 MMOL/L (ref 133–144)
SPECIMEN SOURCE: NORMAL
TROPONIN I SERPL-MCNC: <0.015 UG/L (ref 0–0.04)
TSH SERPL DL<=0.005 MIU/L-ACNC: 2.22 MU/L (ref 0.4–4)
WBC # BLD AUTO: 7.9 10E9/L (ref 4–11)

## 2020-12-15 PROCEDURE — 99223 1ST HOSP IP/OBS HIGH 75: CPT | Mod: AI | Performed by: INTERNAL MEDICINE

## 2020-12-15 PROCEDURE — 99221 1ST HOSP IP/OBS SF/LOW 40: CPT | Mod: GC | Performed by: INTERNAL MEDICINE

## 2020-12-15 PROCEDURE — 250N000013 HC RX MED GY IP 250 OP 250 PS 637: Performed by: STUDENT IN AN ORGANIZED HEALTH CARE EDUCATION/TRAINING PROGRAM

## 2020-12-15 PROCEDURE — 84244 ASSAY OF RENIN: CPT | Performed by: INTERNAL MEDICINE

## 2020-12-15 PROCEDURE — 80048 BASIC METABOLIC PNL TOTAL CA: CPT | Performed by: EMERGENCY MEDICINE

## 2020-12-15 PROCEDURE — 85025 COMPLETE CBC W/AUTO DIFF WBC: CPT | Performed by: EMERGENCY MEDICINE

## 2020-12-15 PROCEDURE — 82088 ASSAY OF ALDOSTERONE: CPT | Performed by: INTERNAL MEDICINE

## 2020-12-15 PROCEDURE — 206N000001 HC R&B BMT UMMC

## 2020-12-15 PROCEDURE — 84443 ASSAY THYROID STIM HORMONE: CPT | Performed by: EMERGENCY MEDICINE

## 2020-12-15 PROCEDURE — 93005 ELECTROCARDIOGRAM TRACING: CPT | Performed by: EMERGENCY MEDICINE

## 2020-12-15 PROCEDURE — 83497 ASSAY OF 5-HIAA: CPT | Performed by: STUDENT IN AN ORGANIZED HEALTH CARE EDUCATION/TRAINING PROGRAM

## 2020-12-15 PROCEDURE — 82533 TOTAL CORTISOL: CPT | Performed by: STUDENT IN AN ORGANIZED HEALTH CARE EDUCATION/TRAINING PROGRAM

## 2020-12-15 PROCEDURE — 99285 EMERGENCY DEPT VISIT HI MDM: CPT | Mod: 25 | Performed by: EMERGENCY MEDICINE

## 2020-12-15 PROCEDURE — 93010 ELECTROCARDIOGRAM REPORT: CPT | Performed by: EMERGENCY MEDICINE

## 2020-12-15 PROCEDURE — 36415 COLL VENOUS BLD VENIPUNCTURE: CPT | Performed by: INTERNAL MEDICINE

## 2020-12-15 PROCEDURE — 87636 SARSCOV2 & INF A&B AMP PRB: CPT | Performed by: EMERGENCY MEDICINE

## 2020-12-15 PROCEDURE — C9803 HOPD COVID-19 SPEC COLLECT: HCPCS | Performed by: EMERGENCY MEDICINE

## 2020-12-15 PROCEDURE — 84484 ASSAY OF TROPONIN QUANT: CPT | Performed by: EMERGENCY MEDICINE

## 2020-12-15 PROCEDURE — 82533 TOTAL CORTISOL: CPT | Performed by: EMERGENCY MEDICINE

## 2020-12-15 PROCEDURE — 250N000013 HC RX MED GY IP 250 OP 250 PS 637: Performed by: EMERGENCY MEDICINE

## 2020-12-15 PROCEDURE — 999N001160 HC STATISICAL ALDOSTERONE/RENIN RATIO: Performed by: INTERNAL MEDICINE

## 2020-12-15 RX ORDER — LABETALOL HYDROCHLORIDE 5 MG/ML
5 INJECTION, SOLUTION INTRAVENOUS EVERY 30 MIN PRN
Status: DISCONTINUED | OUTPATIENT
Start: 2020-12-15 | End: 2020-12-17 | Stop reason: HOSPADM

## 2020-12-15 RX ORDER — AMOXICILLIN 250 MG
2 CAPSULE ORAL 2 TIMES DAILY PRN
Status: DISCONTINUED | OUTPATIENT
Start: 2020-12-15 | End: 2020-12-17 | Stop reason: HOSPADM

## 2020-12-15 RX ORDER — LISINOPRIL 20 MG/1
20 TABLET ORAL DAILY
Status: DISCONTINUED | OUTPATIENT
Start: 2020-12-15 | End: 2020-12-17 | Stop reason: HOSPADM

## 2020-12-15 RX ORDER — LIDOCAINE 40 MG/G
CREAM TOPICAL
Status: DISCONTINUED | OUTPATIENT
Start: 2020-12-15 | End: 2020-12-17 | Stop reason: HOSPADM

## 2020-12-15 RX ORDER — HYDRALAZINE HYDROCHLORIDE 20 MG/ML
5 INJECTION INTRAMUSCULAR; INTRAVENOUS EVERY 4 HOURS PRN
Status: CANCELLED | OUTPATIENT
Start: 2020-12-15

## 2020-12-15 RX ORDER — AMOXICILLIN 250 MG
1 CAPSULE ORAL 2 TIMES DAILY PRN
Status: DISCONTINUED | OUTPATIENT
Start: 2020-12-15 | End: 2020-12-17 | Stop reason: HOSPADM

## 2020-12-15 RX ORDER — CLOPIDOGREL BISULFATE 75 MG/1
75 TABLET ORAL DAILY
Status: DISCONTINUED | OUTPATIENT
Start: 2020-12-16 | End: 2020-12-17 | Stop reason: HOSPADM

## 2020-12-15 RX ORDER — HYDRALAZINE HYDROCHLORIDE 20 MG/ML
5 INJECTION INTRAMUSCULAR; INTRAVENOUS EVERY 6 HOURS PRN
Status: DISCONTINUED | OUTPATIENT
Start: 2020-12-15 | End: 2020-12-15

## 2020-12-15 RX ORDER — CARVEDILOL 3.12 MG/1
3.12 TABLET ORAL 2 TIMES DAILY WITH MEALS
Status: DISCONTINUED | OUTPATIENT
Start: 2020-12-15 | End: 2020-12-17 | Stop reason: HOSPADM

## 2020-12-15 RX ORDER — HYDROXYZINE HYDROCHLORIDE 25 MG/1
25 TABLET, FILM COATED ORAL EVERY 6 HOURS PRN
Status: DISCONTINUED | OUTPATIENT
Start: 2020-12-15 | End: 2020-12-17 | Stop reason: HOSPADM

## 2020-12-15 RX ORDER — OLANZAPINE 5 MG/1
5 TABLET, ORALLY DISINTEGRATING ORAL ONCE
Status: COMPLETED | OUTPATIENT
Start: 2020-12-15 | End: 2020-12-15

## 2020-12-15 RX ORDER — POLYETHYLENE GLYCOL 3350 17 G/17G
17 POWDER, FOR SOLUTION ORAL DAILY PRN
Status: DISCONTINUED | OUTPATIENT
Start: 2020-12-15 | End: 2020-12-17 | Stop reason: HOSPADM

## 2020-12-15 RX ORDER — ACETAMINOPHEN 325 MG/1
650 TABLET ORAL EVERY 4 HOURS PRN
Status: DISCONTINUED | OUTPATIENT
Start: 2020-12-15 | End: 2020-12-17 | Stop reason: HOSPADM

## 2020-12-15 RX ORDER — ASPIRIN 81 MG/1
81 TABLET, CHEWABLE ORAL DAILY
Status: DISCONTINUED | OUTPATIENT
Start: 2020-12-16 | End: 2020-12-17 | Stop reason: HOSPADM

## 2020-12-15 RX ORDER — ATORVASTATIN CALCIUM 10 MG/1
40 TABLET, FILM COATED ORAL EVERY EVENING
Status: DISCONTINUED | OUTPATIENT
Start: 2020-12-15 | End: 2020-12-17 | Stop reason: HOSPADM

## 2020-12-15 RX ORDER — CLONAZEPAM 0.5 MG/1
0.5 TABLET ORAL 2 TIMES DAILY PRN
Status: DISCONTINUED | OUTPATIENT
Start: 2020-12-15 | End: 2020-12-17 | Stop reason: HOSPADM

## 2020-12-15 RX ORDER — HYDRALAZINE HYDROCHLORIDE 20 MG/ML
5 INJECTION INTRAMUSCULAR; INTRAVENOUS EVERY 4 HOURS PRN
Status: DISCONTINUED | OUTPATIENT
Start: 2020-12-15 | End: 2020-12-17 | Stop reason: HOSPADM

## 2020-12-15 RX ORDER — LISINOPRIL 20 MG/1
20 TABLET ORAL DAILY
Status: CANCELLED | OUTPATIENT
Start: 2020-12-16

## 2020-12-15 RX ADMIN — CARVEDILOL 3.12 MG: 3.12 TABLET, FILM COATED ORAL at 17:28

## 2020-12-15 RX ADMIN — CLONAZEPAM 0.5 MG: 0.5 TABLET ORAL at 21:42

## 2020-12-15 RX ADMIN — OLANZAPINE 5 MG: 5 TABLET, ORALLY DISINTEGRATING ORAL at 12:29

## 2020-12-15 RX ADMIN — ATORVASTATIN CALCIUM 40 MG: 10 TABLET, FILM COATED ORAL at 20:19

## 2020-12-15 ASSESSMENT — ENCOUNTER SYMPTOMS
ARTHRALGIAS: 0
FEVER: 0
NECK STIFFNESS: 0
HEADACHES: 0
COLOR CHANGE: 0
ABDOMINAL PAIN: 0
CONFUSION: 0
DIFFICULTY URINATING: 0
SHORTNESS OF BREATH: 0
EYE REDNESS: 0
NUMBNESS: 1
DIAPHORESIS: 1
NERVOUS/ANXIOUS: 1

## 2020-12-15 ASSESSMENT — ACTIVITIES OF DAILY LIVING (ADL): ADLS_ACUITY_SCORE: 13

## 2020-12-15 ASSESSMENT — MIFFLIN-ST. JEOR: SCORE: 1724.32

## 2020-12-15 NOTE — DISCHARGE INSTRUCTIONS
Thank you for coming to the [facility] Emergency Department.     No need to check your blood pressure again tonight unless you feel headache, dizziness or passing out spells.     Tomorrow AM, increase Lisinopril to 20mg (2 tablets) once daily.   Also increase hydroxyzine to 2 tablets every 6 hours if you feel you need it.     When checking your blood pressure at home, be sure you have taken both your blood pressure medication and anxiety medications at least 1 hour before.     Keep your appointment scheduled with your primary care doctor on Wednesday for follow up.

## 2020-12-15 NOTE — H&P
"[facility]  Internal Medicine/Pediatrics  History and Physical    Date of service: 12/15/2020   Patient: Braulio Guardado      : 1955      MRN: [id number]  PCP: No Ref-Primary, Physician            Assessment/Plan:   Braulio Guardado is a 65 year old male with PMHx significant for PTSD, previously seeing a trauma therapist, now presenting with persistent blood pressure elevation.     #Hypertensive urgency  Patient noted to have elevated blood pressure without evidence of end-organ damage. Blood pressure at the time of presentation was 192/113, he was given Hydroxyzine and his BP subsequently decreased to 155/126. Although anxiety may be playing a role in his elevated blood pressures, given the persistence of his symptoms, it is reasonable to consider pheochromocytoma or another secondary cause to hypertension. Previous renal ultrasound with doppler negative for renal artery stenosis.   --Follow results of metanephrine  --Nephrology consulted, appreciate recs  --TSH, random cortisol, and renin/aldosterone ordered  --Target to reduce BP by maximum 20 % in 24 hours and aim for  140/90    #Anxiety  #History of PTSD  Patient notes a history of PTSD, and had previously been in trauma focused therapy for this 2 years ago.  Patient had previously been started on escitalopram, but discontinued after 2 doses due to medication side effects (suicidality)  --Continue home Atarax 25 mg p.o. as needed  --Health psychology consult  --Psychiatry consult  --Klonopin prn    Multiple subacute-chronic CVAs  Multiple areas of infarct of various ages noted during 2020 admission. At that time, \"Neurologic exam largely normal other than some mild numbness and tingling in the left fingers, and per Neurology this may be related to focal ulnar nerve issues\" per documentation.   -- continue plavix.     F/E/N:  - IV Fluids: none  - Electrolytes: Replete electrolytes as needed per protocol  - Diet: regular diet    VTE " "ppx: mechanical  Code Status: Full code  Disposition: In 2-3 days to prior living arrangement after further work-up and management of of hypertensive urgency.     Plan of care discussed with attending physician Dr. COLLEEN Moralez.     Daivd Slade MD  [facility] 5 Service  PGY-3 Internal Medicine-Pediatrics  [facility]            Chief complaint:   Elevated blood pressure, panic attacks          History of Present Illness:   Braulio Guardado is a 65 year old male with PMHx signficant for PTSD and multiple recent presentations for hypertensive urgency, who is presenting today for panic attacks and elevated blood pressure, in the setting of multiple presentations for elevated blood pressure.  During these presentations, he has had a MRI brain, MRA head and, carotid ultrasound, renal ultrasound, and troponin, which were negative. A CBC, CMP were negative.    He states that prior to admission, he was feeling anxious and endorsed chills, panic, sweats, and the right side of his neck and down to his shoulder become really tight during these episodes.  His blood pressure can spike to >200 systolic during these episodes.  He endorses palpitations during these episodes and \"feels blood surging through him.\"  He had to have a family member come and stay with him during the episode.  Of note, he was previously seeing an experienced trauma specialist for symptoms that began at the age of 21, however it has been 2 years since he has seen her since she retired.  Patient notes that he is struggling to find support in the setting of Covid and previous history of trauma.  He was previously on Lexapro for 2 days, before ceasing due to medication side effects (worsening suicidality).  He was also previously on Prozac before discontinuing due to increased thoughts of suicide. He denies headache, chest pain, shortness of breath, nausea, vomiting, abdominal pain, suicidal ideation, homicidal ideation.    Emergency Department " Course:  In the ED, vitals significant for blood pressure of 192/113, respiratory rate of 16, SPO2 96% on room air. Labs were significant for a normal BMP, negative troponin, normal CBC with differential. EKG was normal. He was given Hydroxyzine 25 mg x 1 and Olanzapine 5 mg x 1. Nephrology was consulted in the emergency department.  Concern for potential pheochromocytoma or other etiology that might be causing patient's hypertension.  Given this, he was admitted to the inpatient unit for further work-up and care.             Review of Systems:   10-point ROS reviewed & found negative w/ exceptions noted in the HPI.         Allergies:   No Known Allergies          Medications:        [COMPLETED] hydrOXYzine (ATARAX) tablet 25 mg         aspirin (ASA) 81 MG chewable tablet, Take 1 tablet (81 mg) by mouth daily       atorvastatin (LIPITOR) 40 MG tablet, Take 1 tablet (40 mg) by mouth every evening       clopidogrel (PLAVIX) 75 MG tablet, Take 1 tablet (75 mg) by mouth daily       hydrOXYzine (ATARAX) 25 MG tablet, Take 1 tablet (25 mg) by mouth every 6 hours as needed for other (adjuvant pain)       lisinopril (ZESTRIL) 10 MG tablet, Take 1 tablet (10 mg) by mouth daily              Past Medical History:     Past Medical History:   Diagnosis Date     Cerebral infarction (H)      Hyperlipidemia      Hypertension              Past Surgical History:   No past surgical history on file.         Family History:   No family history on file.          Social History:     Social History     Socioeconomic History     Marital status: Single     Spouse name: Not on file     Number of children: Not on file     Years of education: Not on file     Highest education level: Not on file   Occupational History     Not on file   Social Needs     Financial resource strain: Not on file     Food insecurity     Worry: Not on file     Inability: Not on file     Transportation needs     Medical: Not on file     Non-medical: Not on file  "  Tobacco Use     Smoking status: Never Smoker     Smokeless tobacco: Never Used   Substance and Sexual Activity     Alcohol use: Never     Frequency: Never     Drug use: Yes     Types: Marijuana     Sexual activity: Not on file   Lifestyle     Physical activity     Days per week: Not on file     Minutes per session: Not on file     Stress: Not on file   Relationships     Social connections     Talks on phone: Not on file     Gets together: Not on file     Attends Christian service: Not on file     Active member of club or organization: Not on file     Attends meetings of clubs or organizations: Not on file     Relationship status: Not on file     Intimate partner violence     Fear of current or ex partner: Not on file     Emotionally abused: Not on file     Physically abused: Not on file     Forced sexual activity: Not on file   Other Topics Concern     Not on file   Social History Narrative     Not on file             Physical Exam:   BP (!) 164/111   Pulse 80   Temp 98.7  F (37.1  C) (Oral)   Resp 11   Ht 1.803 m (5' 11\")   Wt 90.7 kg (200 lb)   SpO2 96%   BMI 27.89 kg/m    Temp (24hrs), Av.7  F (37.1  C), Min:98.6  F (37  C), Max:98.7  F (37.1  C)    Wt Readings from Last 2 Encounters:   12/15/20 90.7 kg (200 lb)   20 90.4 kg (199 lb 4.7 oz)     General Appearance: Well developed, well nourished, in no acute distress.   Skin: No rashes, ulcerations, or petechiae.  HEENT: PERRLA, EOMI intact, anicteric sclera, clear conjunctiva, normal external ear anatomy, normal nose anatomy, no lesions, scars, or masses. Normal mucosa lips, teeth, and gums. The oral mucosa, hard and soft palate, tongue and posterior pharynx were normal.  Neck: Supple and symmetric, no thyromegaly, no tenderness, no masses.  Cardiovascular: RRR, no murmurs, rubs, or gallops. Normal carotid pulses. Peripheral pulses were 2+ and symmetric.  Lungs: clear to ascultation bilaterally, no rales, wheezes or rhonchi.   Abdomen: " Non-tender, non-distended abdomen, normal bowel sounds. Liver span was approx 5-6 cm in the right midclavicular line by percussion. The liver edge was nontender. Non-palpable spleen. No inguinal or umbilical hernias, no ascites.  Musculoskeletal: Muscle strength and tone were normal.  Extremities: No edema  Neurologic: Alert and oriented x 3. Normal affect. CX II-XII grossly intact. Normal sensation.           Data:     Results for orders placed or performed during the hospital encounter of 12/15/20 (from the past 24 hour(s))   CBC with platelets differential   Result Value Ref Range    WBC 7.9 4.0 - 11.0 10e9/L    RBC Count 5.36 4.4 - 5.9 10e12/L    Hemoglobin 16.1 13.3 - 17.7 g/dL    Hematocrit 47.3 40.0 - 53.0 %    MCV 88 78 - 100 fl    MCH 30.0 26.5 - 33.0 pg    MCHC 34.0 31.5 - 36.5 g/dL    RDW 12.1 10.0 - 15.0 %    Platelet Count 249 150 - 450 10e9/L    Diff Method Automated Method     % Neutrophils 74.1 %    % Lymphocytes 17.2 %    % Monocytes 7.6 %    % Eosinophils 0.3 %    % Basophils 0.4 %    % Immature Granulocytes 0.4 %    Nucleated RBCs 0 0 /100    Absolute Neutrophil 5.8 1.6 - 8.3 10e9/L    Absolute Lymphocytes 1.4 0.8 - 5.3 10e9/L    Absolute Monocytes 0.6 0.0 - 1.3 10e9/L    Absolute Eosinophils 0.0 0.0 - 0.7 10e9/L    Absolute Basophils 0.0 0.0 - 0.2 10e9/L    Abs Immature Granulocytes 0.0 0 - 0.4 10e9/L    Absolute Nucleated RBC 0.0    Basic metabolic panel   Result Value Ref Range    Sodium 140 133 - 144 mmol/L    Potassium 3.7 3.4 - 5.3 mmol/L    Chloride 109 94 - 109 mmol/L    Carbon Dioxide 26 20 - 32 mmol/L    Anion Gap 5 3 - 14 mmol/L    Glucose 111 (H) 70 - 99 mg/dL    Urea Nitrogen 15 7 - 30 mg/dL    Creatinine 0.81 0.66 - 1.25 mg/dL    GFR Estimate >90 >60 mL/min/[1.73_m2]    GFR Estimate If Black >90 >60 mL/min/[1.73_m2]    Calcium 8.8 8.5 - 10.1 mg/dL   Troponin I   Result Value Ref Range    Troponin I ES <0.015 0.000 - 0.045 ug/L   EKG 12-lead, tracing only   Result Value Ref Range     Interpretation ECG Click View Image link to view waveform and result         Internal Medicine Staff Addendum  Date of Service: 12/15/2020    I have seen and examined this patient, reviewed the data and discussed the plan of care. I agree with the above documentation including plan and ddx unless otherwise stated:     # severe hypertension concerning for risk with history of recent CVAs . L hand numbness concerning for malignant hypertension. Closely monitor.     # panic attack. Ok to use clonazepam short term. Recommending inpatient psych eval as seems to be linked to severe hypertension    Renny Moralez MD  Internal Medicine Hospitalist  [facility]  Attending pager: [phone]

## 2020-12-15 NOTE — CONSULTS
Nephrology Initial Consult  December 15, 2020      Braulio Guardado MRN:[id number] YOB: 1955  Date of Admission:12/15/2020  Primary care provider: No Ref-Primary, Physician  Requesting physician: Renny Moralez, *    ASSESSMENT AND RECOMMENDATIONS:   65-year-old male patient admitted with hypertensive urgency.  On presentation blood pressure 192/113.  Nephrology consulted for further management of uncontrolled blood pressure.    Persistent hypertensive urgency  Patient is documenting elevated blood pressures with systolics in the 200s even at home.  Not responding to home dose of lisinopril 10 mg daily at home.  His recent MRI imaging did show subacute to chronic stroke.  No definite history of whether he has had blood pressure for a long time.  But based on the fact that he has been having subacute/chronic strokes this is a possibility.  TTE unremarkable with LVEF 65%     RECOMMENDATIONS    1.  Increase dose of lisinopril to 20 mg daily.  Consider Labetalol PRN /Hydralazine prn  For SBP > 160 ( Labetalol 5 mg every  30 minutes prn  . If not effective consider hydralazine 5 mg IV every 4 hrs )   2.  Start Coreg 3.125 mg twice daily - titrate up as needed   3.  Please follow results of metanephrine. Also please check urine 5HIAA  4.  Please Check Renin , Aldosterone, cortisol level ,TSH   5.  Please do Renal US with doppler to r/o renal artery stenosis   6.  Target to reduce BP by maximum 20 % in 24 hours and aim for  140/90   7.  Daily renal panel , I/O daily weight    8. Please notify  Nephrology on call if  BP pressure still remains elevated > 160 despite above measures     Recommendations were communicated to primary team via note and verbal communication  Patient seen and discussed with Dr Vinh Mitchell MD, FACP  Nephrology Fellow   [facility]   Pager [phone]              REASON FOR CONSULT: hypertensive urgency     HISTORY OF PRESENT ILLNESS:  Admitting  provider and nursing notes reviewed  Braulio Guardado is a 65 year old male patient with history of PTSD, depression, HTN, HLD, recent history of cerebral infarction admitted with hypertensive urgency.  On presentation blood pressure 192/113.  Nephrology consulted for further management of uncontrolled blood pressure.  Recurrent ED presentation for similar symptoms of uncontrolled hypertension    Recently admitted between 12/9/2020 to 12/11/2020 due to hypertensive urgency, with SBP same 200s and diastolic blood pressure in the 140s on presentation.  She responded to IV hydralazine and IV labetalol.  He was started on oral amlodipine.  Improved his blood pressure readings.  He was found to have multiple subacute-chronic CVAs.  He had some tingling and numbness over his left fingers which neurology felt was due to ulnar nerve neuropathy.  Carotid artery ultrasound was normal.  TTE was normal.  A1c normal LDL was elevated.  He was started on high intensity statin therapy, aspirin and Plavix.  He is being further evaluated for any regular rhythm with Zio patch.  When he went back home and checked his blood pressure, he found his systolic blood pressure to be high in 200s so he came back again and was readmitted.  This time his amlodipine was stopped and he was started on lisinopril.  He also needed IV labetalol and clonidine to normalize his blood pressure.  He was discharged when his blood pressure stabilized on lisinopril 10 mg daily.  He came back to the ED with elevated blood pressures on 12/14/2020, unclear whether he got any antihypertensives but he did get Vistaril.  He is back again today with similar symptoms of elevated blood pressure.  On presentation his blood pressure was 192/113    Feels not well, no shortness of breath or chest pain.  Just feels like a pressure sensation in his neck and arms.  His labs are unremarkable.  Normal creatinine at 0.81 and GFR greater than 90.  Glucose 111.  Troponins are  normal electrolytes are normal no acute acid-base issues.  Hemoglobin stable at 16.1.  EKG does not show any acute ST changes.  It shows normal sinus rhythm.    PAST MEDICAL HISTORY:  Reviewed with patient on 12/15/2020     Past Medical History:   Diagnosis Date     Cerebral infarction (H)      Hyperlipidemia      Hypertension        No past surgical history on file.     MEDICATIONS:  PTA Meds  Prior to Admission medications    Medication Sig Last Dose Taking? Auth Provider   aspirin (ASA) 81 MG chewable tablet Take 1 tablet (81 mg) by mouth daily   Ana Langley MD   atorvastatin (LIPITOR) 40 MG tablet Take 1 tablet (40 mg) by mouth every evening   Ana Langley MD   clopidogrel (PLAVIX) 75 MG tablet Take 1 tablet (75 mg) by mouth daily   Ana Langley MD   hydrOXYzine (ATARAX) 25 MG tablet Take 1 tablet (25 mg) by mouth every 6 hours as needed for other (adjuvant pain)   Shara Kimball APRN CNP   lisinopril (ZESTRIL) 10 MG tablet Take 1 tablet (10 mg) by mouth daily   Shara Kimball APRN CNP      Current Meds    sodium chloride (PF)  3 mL Intracatheter Q8H     Infusion Meds      ALLERGIES:    No Known Allergies    REVIEW OF SYSTEMS:  A comprehensive of systems was negative except as noted above.    SOCIAL HISTORY:   Social History     Socioeconomic History     Marital status: Single     Spouse name: Not on file     Number of children: Not on file     Years of education: Not on file     Highest education level: Not on file   Occupational History     Not on file   Social Needs     Financial resource strain: Not on file     Food insecurity     Worry: Not on file     Inability: Not on file     Transportation needs     Medical: Not on file     Non-medical: Not on file   Tobacco Use     Smoking status: Never Smoker     Smokeless tobacco: Never Used   Substance and Sexual Activity     Alcohol use: Never     Frequency: Never     Drug use: Yes     Types: Marijuana     Sexual  "activity: Not on file   Lifestyle     Physical activity     Days per week: Not on file     Minutes per session: Not on file     Stress: Not on file   Relationships     Social connections     Talks on phone: Not on file     Gets together: Not on file     Attends Synagogue service: Not on file     Active member of club or organization: Not on file     Attends meetings of clubs or organizations: Not on file     Relationship status: Not on file     Intimate partner violence     Fear of current or ex partner: Not on file     Emotionally abused: Not on file     Physically abused: Not on file     Forced sexual activity: Not on file   Other Topics Concern     Not on file   Social History Narrative     Not on file     Reviewed with patient       FAMILY MEDICAL HISTORY:   No family history on file.  Reviewed with patient     PHYSICAL EXAM:   Temp  Av.7  F (37.1  C)  Min: 98.6  F (37  C)  Max: 98.7  F (37.1  C)      Pulse  Av.1  Min: 71  Max: 115 Resp  Av.7  Min: 11  Max: 36  SpO2  Av.6 %  Min: 94 %  Max: 97 %       BP (!) 160/111   Pulse 81   Temp 98.7  F (37.1  C) (Oral)   Resp 15   Ht 1.803 m (5' 11\")   Wt 90.7 kg (200 lb)   SpO2 96%   BMI 27.89 kg/m        Admit Weight: 90.7 kg (200 lb)     GENERAL APPEARANCE: no  distress, he is  awake  EYES: no scleral icterus, pupils equal  Endo: no  goiter, no moon facies  Lymphatics: no cervical or supraclavicular LAD  Pulmonary: lungs clear to auscultation with equal breath sounds bilaterally, no clubbing  CV: regular rhythm, normal rate, no rub   - JVD  no   - Edema none  GI: soft, nontender, normal bowel sounds  MS: no evidence of inflammation in joints, no muscle tenderness  : no mendieta  SKIN: no rash, warm, dry, no cyanosis  NEURO: face symmetric, no  asterixis     LABS:   CMP  Recent Labs   Lab 12/15/20  1056 20  1746 20  0653 20  0924 20  2225 20  2225 20  1709 20  1709    141 140  --   --  141   < > " 140   POTASSIUM 3.7 3.4 3.9 3.6   < > 3.3*   < > 3.2*   CHLORIDE 109 108 109  --   --  108   < > 107   CO2 26 27 25  --   --  25   < > 24   ANIONGAP 5 6 5  --   --  7   < > 9   * 98 93  --   --  119*   < > 106*   BUN 15 13 17  --   --  19   < > 14   CR 0.81 0.89 0.94  --   --  0.90   < > 0.90   GFRESTIMATED >90 >90 86  --   --  >90   < > >90   GFRESTBLACK >90 >90 >90  --   --  >90   < > >90   ALVINA 8.8 8.8 9.0  --   --  9.2   < > 9.2   MAG  --   --  2.5*  --   --  2.5*  --  2.6*   PROTTOTAL  --   --   --   --   --  7.5  --  7.5   ALBUMIN  --   --   --   --   --  3.9  --  3.9   BILITOTAL  --   --   --   --   --  0.7  --  0.6   ALKPHOS  --   --   --   --   --  102  --  101   AST  --   --   --   --   --  20  --  15   ALT  --   --   --   --   --  34  --  25    < > = values in this interval not displayed.     CBC  Recent Labs   Lab 12/15/20  1056 12/14/20  1746 12/13/20  0653 12/11/20  2225   HGB 16.1 16.7 15.6 16.5   WBC 7.9 10.0 7.9 8.3   RBC 5.36 5.53 5.18 5.50   HCT 47.3 48.6 46.4 48.4   MCV 88 88 90 88   MCH 30.0 30.2 30.1 30.0   MCHC 34.0 34.4 33.6 34.1   RDW 12.1 11.9 12.2 12.3    258 257 263     INR  Recent Labs   Lab 12/09/20  1709   INR 1.04     ABGNo lab results found in last 7 days.   URINE STUDIES  Recent Labs   Lab Test 12/12/20  0042   COLOR Yellow   APPEARANCE Clear   URINEGLC Negative   URINEBILI Negative   URINEKETONE Negative   SG 1.018   UBLD Trace*   URINEPH 5.5   PROTEIN 10*   NITRITE Negative   LEUKEST Negative   RBCU 4*   WBCU 1     No lab results found.  PTH  No lab results found.  IRON STUDIES  No lab results found.    IMAGING:  All imaging studies reviewed by me.     Paula Barraza MD

## 2020-12-15 NOTE — PHARMACY-ADMISSION MEDICATION HISTORY
Admission Medication History Completed by Pharmacy    See [facility] Admission Navigator for allergy information, preferred outpatient pharmacy, prior to admission medications and immunization status.     Medication History Sources:     Prescription adjudication database, patient, chart review      Changes made to PTA medication list (reason):    Added:   o Vitamin B12 tablet; 1 tab PO every day (patient unsure of the strength)    Deleted: None    Changed: None    Additional Information:  The patient was a reliable historian and was able to recall medication names, doses and administration instructions.     Patient had no further questions and was advised to reach out to the nursing staff to contact pharmacy to address any medication related concerns.     Patient reported that last doses of medications was this morning at 8AM prior to coming into the hospital    Prior to Admission medications    Medication Sig Last Dose Taking? Auth Provider   aspirin (ASA) 81 MG chewable tablet Take 1 tablet (81 mg) by mouth daily 12/15/2020 at am Yes Ana Langley MD   atorvastatin (LIPITOR) 40 MG tablet Take 1 tablet (40 mg) by mouth every evening 12/14/2020 at pm Yes Ana Langley MD   clopidogrel (PLAVIX) 75 MG tablet Take 1 tablet (75 mg) by mouth daily 12/15/2020 at am Yes Ana Langley MD   Cyanocobalamin (VITAMIN B 12 PO) Take 1 tablet by mouth daily 12/15/2020 at am Yes Unknown, Entered By History   hydrOXYzine (ATARAX) 25 MG tablet Take 1 tablet (25 mg) by mouth every 6 hours as needed for other (adjuvant pain) 12/15/2020 at am Yes Shara Kimball APRN CNP   lisinopril (ZESTRIL) 10 MG tablet Take 1 tablet (10 mg) by mouth daily 12/15/2020 at am Yes Shara Kimball APRN CNP       Date completed: 12/15/20    Medication history completed by:   Shalom Teixeira, PharmD  PGY-1 Pharmacy Resident

## 2020-12-15 NOTE — ED TRIAGE NOTES
Dx with CVA on 12/9, states they found 4 small chronic CVAs  Had the MRI due to HTN and left finger numbness but no changes in his neurologic symptoms in the last few days  New symptoms are sweating, agitation, anxiety, PTSD flares  Gets a spiky/tingly pain to his neck when his BP is high  Seen several times for hypertension and lots of med changes  Took his BP routinely this AM and was 206/121

## 2020-12-15 NOTE — ED PROVIDER NOTES
[place] EMERGENCY DEPARTMENT ([facility])  12/15/20   History     Chief Complaint   Patient presents with     Hypertension     The history is provided by the patient and medical records.     Braulio Guardado is a 65 year old male with a past medical history significant for PTSD, depression, anxiety, hypertension (treated with lisinopril), hyperlipidemia (on atorvastatin), and cerebral infarction on Plavix (seen with imaging on 12/10/2020) who presents with persistent hypertension.    Reviewed the patient's medical record, he was seen in neurology clinic on 12/9/2020 complaining of numbness to his left small finger for 6 months.  He had blood pressures taken showing 228/145 and heart rate of 107.  He had a rapid response called and was sent to the ER for evaluation. MR brain was done with, and without contrast and he was found to have subacute/chronic CVAs in setting of hypertensive urgency. Full impression noted below.  He was started on Plavix, and amlodipine.  He continued to have elevated blood pressures and was seen in the ED soon after discharge and was in the observation unit here at the [facility] from 12/11-12/13/2020.  He had trial of labetalol without improvement.  He was advised to stop taking the amlodipine when he was found that he had a better response to lisinopril.  He was discharged to home on lisinopril 10 mg daily.  Was seen in the ED yesterday, told to double his lisinopril and discharge home again.     Today, he has been experiencing diaphoresis, agitation, anxiety and PTSD flares.  He has a tingling sensation to his neck when his blood pressure is higher.  Patient states that he has had ongoing hypertension since being discharged from the ED last night.  Patient states that he took his blood pressure twice yesterday and noted to be 204/123 with a heart rate of 104.  He checked it and while later after trying to lay down, and found it to be 206/121 with a  "heart rate of 104.  The patient states he took his doubled his dosage of lisinopril (20 mg total) to attempt to reduce his blood pressure, but states that this was not effective.  He states he felt \"miserable\" last night, and even endorsed thoughts of wanting to die.  He denies thoughts of suicide, suicidal plan, or even history of self-injurious behavior.  The patient states that his hydroxyzine has not been helping his anxiety as much as it has in the past.  He believes his anxiety has been \"out of control\".  He states that he has a distant history of panic attacks, but notes he has had 3 panic attacks since 12/9/2020. He has a history of adverse reaction to Prozac years ago, in the setting on increased panic attacks. He was recently treated with Lexapro while in the observation unit, and reports this also made him have a panic attack. Patient woke up this morning, and again doubled his lisinopril around 8:40 AM.  This did not improve his BP, and patient states this is what elected to him to come in. He states he has had new episodes of \"hot flashes\" associated with the sweating down the back of the neck. He states he has sweat through a shirt last night. This increased diaphoresis has been ongoing for the past 1-2 months, but states that it has worsened, and happened more often over the past 3 days. The patient also endorses ongoing \"stroke\" symptoms, which consist of feeling as if his throat is closing, and \" odd twinges\" to the outside of the mouth.    Per chart review previous work-up includes MRI brain, MRA head and neck, carotid ultrasound, renal ultrasound, and labs including troponin and urine metanephrine    I have reviewed the Medications, Allergies, Past Medical and Surgical History, and Social History in the [employer] system.  PAST MEDICAL HISTORY:   Past Medical History:   Diagnosis Date     Cerebral infarction (H)      Hyperlipidemia      Hypertension        PAST SURGICAL HISTORY: No past surgical history " "on file.    Past medical history, past surgical history, medications, and allergies were reviewed with the patient. Additional pertinent items: None    FAMILY HISTORY: No family history on file.    SOCIAL HISTORY:   Social History     Tobacco Use     Smoking status: Never Smoker     Smokeless tobacco: Never Used   Substance Use Topics     Alcohol use: Never     Frequency: Never     Social history was reviewed with the patient. Additional pertinent items: None      Patient's Medications   New Prescriptions    No medications on file   Previous Medications    ASPIRIN (ASA) 81 MG CHEWABLE TABLET    Take 1 tablet (81 mg) by mouth daily    ATORVASTATIN (LIPITOR) 40 MG TABLET    Take 1 tablet (40 mg) by mouth every evening    CLOPIDOGREL (PLAVIX) 75 MG TABLET    Take 1 tablet (75 mg) by mouth daily    HYDROXYZINE (ATARAX) 25 MG TABLET    Take 1 tablet (25 mg) by mouth every 6 hours as needed for other (adjuvant pain)    LISINOPRIL (ZESTRIL) 10 MG TABLET    Take 1 tablet (10 mg) by mouth daily   Modified Medications    No medications on file   Discontinued Medications    No medications on file        No Known Allergies     Review of Systems   Constitutional: Positive for diaphoresis. Negative for fever.   HENT: Negative for congestion.    Eyes: Negative for redness.   Respiratory: Negative for shortness of breath.    Cardiovascular: Negative for chest pain.   Gastrointestinal: Negative for abdominal pain.   Genitourinary: Negative for difficulty urinating.   Musculoskeletal: Negative for arthralgias and neck stiffness.   Skin: Negative for color change.   Neurological: Positive for numbness. Negative for headaches.   Psychiatric/Behavioral: Negative for confusion. The patient is nervous/anxious.        Physical Exam   BP: (!) 192/113  Pulse: 105  Temp: 98.7  F (37.1  C)  Resp: 16  Height: 180.3 cm (5' 11\")  Weight: 90.7 kg (200 lb)  SpO2: 96 %      General: awake, alert, NAD  Head: normal cephalic  HEENT: pupils equal, " conjugate gaze intact  Neck: Supple  CV: regular rate and rhythm without murmur  Lungs: clear to auscultation  Abd: soft, non-tender, no guarding, no peritoneal signs  EXT: lower extremities without swelling or edema  Neuro: awake, answers questions appropriately. No focal deficits noted     ED Course     Medications   OLANZapine zydis (zyPREXA) ODT tab 5 mg (has no administration in time range)     Results for orders placed or performed during the hospital encounter of 12/15/20   CBC with platelets differential     Status: None   Result Value Ref Range    WBC 7.9 4.0 - 11.0 10e9/L    RBC Count 5.36 4.4 - 5.9 10e12/L    Hemoglobin 16.1 13.3 - 17.7 g/dL    Hematocrit 47.3 40.0 - 53.0 %    MCV 88 78 - 100 fl    MCH 30.0 26.5 - 33.0 pg    MCHC 34.0 31.5 - 36.5 g/dL    RDW 12.1 10.0 - 15.0 %    Platelet Count 249 150 - 450 10e9/L    Diff Method Automated Method     % Neutrophils 74.1 %    % Lymphocytes 17.2 %    % Monocytes 7.6 %    % Eosinophils 0.3 %    % Basophils 0.4 %    % Immature Granulocytes 0.4 %    Nucleated RBCs 0 0 /100    Absolute Neutrophil 5.8 1.6 - 8.3 10e9/L    Absolute Lymphocytes 1.4 0.8 - 5.3 10e9/L    Absolute Monocytes 0.6 0.0 - 1.3 10e9/L    Absolute Eosinophils 0.0 0.0 - 0.7 10e9/L    Absolute Basophils 0.0 0.0 - 0.2 10e9/L    Abs Immature Granulocytes 0.0 0 - 0.4 10e9/L    Absolute Nucleated RBC 0.0    Basic metabolic panel     Status: Abnormal   Result Value Ref Range    Sodium 140 133 - 144 mmol/L    Potassium 3.7 3.4 - 5.3 mmol/L    Chloride 109 94 - 109 mmol/L    Carbon Dioxide 26 20 - 32 mmol/L    Anion Gap 5 3 - 14 mmol/L    Glucose 111 (H) 70 - 99 mg/dL    Urea Nitrogen 15 7 - 30 mg/dL    Creatinine 0.81 0.66 - 1.25 mg/dL    GFR Estimate >90 >60 mL/min/[1.73_m2]    GFR Estimate If Black >90 >60 mL/min/[1.73_m2]    Calcium 8.8 8.5 - 10.1 mg/dL   Troponin I     Status: None   Result Value Ref Range    Troponin I ES <0.015 0.000 - 0.045 ug/L   EKG 12-lead, tracing only     Status: None  (Preliminary result)   Result Value Ref Range    Interpretation ECG Click View Image link to view waveform and result           Procedures             EKG Interpretation:      Interpreted by Oral Roman MD  Time reviewed: 10:58 AM  Symptoms at time of EKG: hypertension    Rhythm: normal sinus   Rate: 82  Axis: normal  Ectopy: none  Conduction: normal  ST Segments/ T Waves: No ST-T wave changes  Q Waves: none  Comparison to prior: Unchanged from 12/14/20    Clinical Impression: normal EKG             Assessments & Plan (with Medical Decision Making)   Braulio Guardado is a 65-year-old male who presents to the emergency department for the fouth time in the last 6 days for hypertension.  Patient is endorsing a lot of symptoms including's diffuse sweating, anxiety, and persistent numbness and tingling however none of the symptoms appear to be acute or changed.    I did review past medical history, patient did have a normal TSH collected just earlier this week so do not think that repeating that would be warranted.  Per chart review patient has already had extensive evaluation including normal Doppler ultrasounds of the kidneys, normal ultrasound of the carotids, cervical spine MRI that shows degenerative changes but no high-grade spinal canal stenosis, CTA of the head and neck which demonstrates no aneurysms or stenosis of the major neck or intracranial arteries, and the MRI that shows that shows chronic infarcts.  Here the patient is hypertensive but does not have other new signs or symptoms of endorgan damage such as headache, vision changes, new chest pain.  Patient does have persistent numbness and persistent neck symptoms and persistent anxiety which seems to be relatively unchanged in the over the time that he has had this extensive evaluation.    Patient's EKG is unchanged.  Patient had normal CBC, BMP, and negative troponin.    Patient was given Zyprexa here in the emergency department for his anxiety.    I  discussed case with nephrology to see if they could make additional recommendations about blood pressure management, they ultimately recommended admission stating that if patient continues to have pressures in the 220s at home he needs to be admitted for monitoring.  Patient's blood pressures did improve in the ER without treatment.  Would recommend a psych consult as well once the patient is admitted.  Called the lab, his metanephrine tests are still pending but should be back later today or tomorrow.    This part of the document was transcribed by Sherri Reagan, Medical Scribe.      I have reviewed the nursing notes.    I have reviewed the findings, diagnosis, plan and need for follow up with the patient.    New Prescriptions    No medications on file       Final diagnoses:   Benign essential hypertension   I, Brenden Valentin, am serving as a trained medical scribe to document services personally performed by Oral Roman MD, based on the provider's statements to me.      IOral MD, was physically present and have reviewed and verified the accuracy of this note documented by Brenden Valentin.    12/15/2020   [facility] EMERGENCY DEPARTMENT     Oral Roman MD  12/15/20 [id number]

## 2020-12-16 PROBLEM — Z20.828 EXPOSURE TO SARS-ASSOCIATED CORONAVIRUS: Status: ACTIVE | Noted: 2020-12-16

## 2020-12-16 PROCEDURE — G0378 HOSPITAL OBSERVATION PER HR: HCPCS

## 2020-12-16 PROCEDURE — 250N000013 HC RX MED GY IP 250 OP 250 PS 637: Performed by: STUDENT IN AN ORGANIZED HEALTH CARE EDUCATION/TRAINING PROGRAM

## 2020-12-16 PROCEDURE — 206N000001 HC R&B BMT UMMC

## 2020-12-16 PROCEDURE — 99233 SBSQ HOSP IP/OBS HIGH 50: CPT | Mod: GC | Performed by: INTERNAL MEDICINE

## 2020-12-16 PROCEDURE — 99207 PR CDG-CODE CATEGORY CHANGED: CPT

## 2020-12-16 PROCEDURE — 99222 1ST HOSP IP/OBS MODERATE 55: CPT

## 2020-12-16 PROCEDURE — 99231 SBSQ HOSP IP/OBS SF/LOW 25: CPT | Mod: GC | Performed by: INTERNAL MEDICINE

## 2020-12-16 RX ADMIN — CARVEDILOL 3.12 MG: 3.12 TABLET, FILM COATED ORAL at 20:11

## 2020-12-16 RX ADMIN — ATORVASTATIN CALCIUM 40 MG: 10 TABLET, FILM COATED ORAL at 20:11

## 2020-12-16 RX ADMIN — CLONAZEPAM 0.5 MG: 0.5 TABLET ORAL at 20:16

## 2020-12-16 RX ADMIN — LISINOPRIL 20 MG: 20 TABLET ORAL at 10:42

## 2020-12-16 RX ADMIN — CLOPIDOGREL BISULFATE 75 MG: 75 TABLET ORAL at 08:14

## 2020-12-16 RX ADMIN — ASPIRIN 81 MG CHEWABLE TABLET 81 MG: 81 TABLET CHEWABLE at 08:14

## 2020-12-16 RX ADMIN — CARVEDILOL 3.12 MG: 3.12 TABLET, FILM COATED ORAL at 10:42

## 2020-12-16 ASSESSMENT — ACTIVITIES OF DAILY LIVING (ADL)
ADLS_ACUITY_SCORE: 13
ADLS_ACUITY_SCORE: 13
ADLS_ACUITY_SCORE: 12
ADLS_ACUITY_SCORE: 13

## 2020-12-16 ASSESSMENT — MIFFLIN-ST. JEOR: SCORE: 1712.98

## 2020-12-16 NOTE — PLAN OF CARE
"PRIMARY DIAGNOSIS: \"GENERIC\" NURSING  OUTPATIENT/OBSERVATION GOALS TO BE MET BEFORE DISCHARGE:  ADLs back to baseline: Yes    Activity and level of assistance: Ambulating independently.    Pain status: Pain free.    Return to near baseline physical activity: Yes    Pt exhibits mild anxiety today but declines intervention stating it is tolerable. Mild neck ache, using hot pack with adequate relief.     Discharge Planner Nurse   Safe discharge environment identified: Yes  Barriers to discharge: Yes    Discharge goals:  -- Seen and evaluated by psychiatry -pending  -- Seen and evaluated by health psychology - pending  -- Completion of inpatient renal work-up -pending  -- BP goal <150/90- /84 after resuming lisinopril and coreg.       Entered by: Suzi Ferreira 12/16/2020 2:53 PM     Please review provider order for any additional goals.   Nurse to notify provider when observation goals have been met and patient is ready for discharge.    Problem: Adult Inpatient Plan of Care  Goal: Plan of Care Review  Outcome: Improving  Flowsheets (Taken 12/16/2020 0996)  Plan of Care Reviewed With: patient  Progress: improving     "

## 2020-12-16 NOTE — PROVIDER NOTIFICATION
Paged resident Dr. Ware:  for pt to be observation status, there needs to be measurable observation goals ordered.

## 2020-12-16 NOTE — PROVIDER NOTIFICATION
Paged Dr. Ware/ resident:  pt asking about his blood pressure meds (lisinopril and coreg) which are currently on hold. /96 HR 65.  Awaiting response.    1045: provider unheld lisinopril and coreg.

## 2020-12-16 NOTE — PLAN OF CARE
"/89   Pulse 57   Temp 97.8  F (36.6  C) (Oral)   Resp 18   Ht 1.803 m (5' 11\")   Wt 90.7 kg (200 lb)   SpO2 95%   BMI 27.89 kg/m      Patient up independently in the room. Vitals were stable. Hypertensive at times, prn medication parameters not met. Blood pressures have been in the 110s/80's at end of shift. Urine sample sent down to lab. Patient received one dose of clonazepam for anxiety at 1940. Clonazepam helped with patient's anxiety overnight. Patient denies pain at this time. No other complaints at this time. Will continue to follow plan of care.   Problem: Adult Inpatient Plan of Care  Goal: Absence of Hospital-Acquired Illness or Injury  Intervention: Identify and Manage Fall Risk  Recent Flowsheet Documentation  Taken 12/15/2020 2000 by Alma Workman RN  Safety Promotion/Fall Prevention:    assistive device/personal items within reach    nonskid shoes/slippers when out of bed    fall prevention program maintained    clutter free environment maintained     Problem: Adult Inpatient Plan of Care  Goal: Absence of Hospital-Acquired Illness or Injury  Intervention: Prevent and Manage VTE (Venous Thromboembolism) Risk  Recent Flowsheet Documentation  Taken 12/15/2020 2000 by Alma Workman RN  VTE Prevention/Management:    ambulation promoted    fluids promoted     "

## 2020-12-16 NOTE — PROGRESS NOTES
Patient admitted to: 5C  Admitted from: ED  Arrived by: litter  Reason for admission: control hypertension  Patient accompanied by: none  Belongings: with him clothes and cell phone  Teaching: done  Skin double check completed by:mark Cameron

## 2020-12-16 NOTE — PROGRESS NOTES
[facility]     Medicine Progress Note    Date of Service (when I saw the patient): 2020  Name: Braulio Guardado  : 1955; 65 year old  MRN: [id number]  Date of Admission: 12/15/2020  Hospital Day: 1     Assessment & Plan   Braulio Guardado is a 65 year old male with PMHx significant for PTSD, previously seeing a trauma therapist, now presenting with persistent blood pressure elevation.      Changes Today:  -- No changes today    #Hypertensive urgency  Patient noted to have elevated blood pressure without evidence of end-organ damage. Blood pressure at the time of presentation was 192/113, he was given Hydroxyzine and his BP subsequently decreased to 155/126. Although anxiety may be playing a role in his elevated blood pressures, given the persistence of his symptoms, it is reasonable to consider pheochromocytoma or another secondary cause to hypertension. Previous renal ultrasound with doppler negative for renal artery stenosis. BPs improved this AM to wnl, although only received coreg 3.125 mg  x1 and klonopin. Suspect overnight resumption of normal pressures may indicate anxiety as a primary component.  --Follow results of metanephrine  --held coreg 3.125 mg PO BID and lisinopril 20 mg PO daily  --Nephrology consulted, appreciate recs  --TSH, random cortisol, and renin/aldosterone ordered  --Target to reduce BP by maximum 20 % in 24 hours and aim for  140/90     #Anxiety  #History of PTSD  Patient notes a history of PTSD, and had previously been in trauma focused therapy for this 2 years ago.  Patient had previously been started on escitalopram, but discontinued after 2 doses due to medication side effects (suicidality)  --Continue home Atarax 25 mg p.o. as needed  --Health psychology consult  --Psychiatry consult  --Klonopin prn     Multiple subacute-chronic CVAs  Multiple areas of infarct of various ages noted during 2020 admission. At that time,  "\"Neurologic exam largely normal other than some mild numbness and tingling in the left fingers, and per Neurology this may be related to focal ulnar nerve issues\" per documentation.   -- continue plavix.      F/E/N:  - IV Fluids: none  - Electrolytes: Replete electrolytes as needed per protocol  - Diet: regular diet     VTE ppx: mechanical  Code Status: Full code  Disposition: In 2-3 days to prior living arrangement after further work-up and management of of hypertensive urgency.      Plan of care discussed with attending physician Dr. COLLEEN Moralez.      MD Kenia [facility]  PGY-3 Internal Medicine-Pediatrics  [facility]       Interval History   Nursing notes reviewed. Patient doing well overall with no acute complaints. Denies chest pain, shortness of breath, abdominal pain. Was walking around and meditating.     Physical Exam   Temp: 97.8  F (36.6  C) Temp src: Oral BP: 116/89 Pulse: 57   Resp: 18 SpO2: 95 % O2 Device: None (Room air)    Vitals:    12/15/20 1024   Weight: 90.7 kg (200 lb)     Vital Signs with Ranges  Temp:  [97.8  F (36.6  C)-98.7  F (37.1  C)] 97.8  F (36.6  C)  Pulse:  [] 57  Resp:  [11-32] 18  BP: (116-192)/() 116/89  SpO2:  [95 %-97 %] 95 %  I/O last 3 completed shifts:  In: -   Out: 300 [Urine:300]    General: WDWN and in no acute distress  HEENT: anicteric sclera, conjunctiva clear. EOMI. Normal external ear anatomy. External ear normal without discharge.   Heart: RRR, nl S1 and S2, no murmurs, rubs, or gallops  Lungs: CTAB, no rales, wheezes, or rhonchi  Abdomen: NTND, soft, no masses  Extremities: WWP. No edema.    Medications       aspirin  81 mg Oral Daily     atorvastatin  40 mg Oral QPM     [Held by provider] carvedilol  3.125 mg Oral BID w/meals     clopidogrel  75 mg Oral Daily     [Held by provider] lisinopril  20 mg Oral Daily     sodium chloride (PF)  3 mL Intracatheter Q8H       Data   No new labs/imaging today      Internal Medicine " Staff Addendum  Date of Service: 12/16/2020    I have seen and examined this patient, reviewed the data and discussed the plan of care. I agree with the above documentation including plan and ddx unless otherwise stated:     #    Renny Moralez MD  Internal Medicine Hospitalist  [facility]  Attending pager: [phone]

## 2020-12-16 NOTE — UTILIZATION REVIEW
"    Admission Status; Secondary Review Determination         Under the authority of the Utilization Management Committee, the utilization review process indicated a secondary review on the above patient.  The review outcome is based on review of the medical records, discussions with staff, and applying clinical experience noted on the date of the review.        ()      Inpatient Status Appropriate - This patient's medical care is consistent with medical management for inpatient care and reasonable inpatient medical practice.      (x) Observation Status Appropriate - This patient does not meet hospital inpatient criteria and is placed in observation status. If this patient's primary payer is Medicare and was admitted as an inpatient, Condition Code 44 should be used and patient status changed to \"observation\".   () Admission Status NOT Appropriate - This patient's medical care is not consistent with medical management for Inpatient or Observation Status.          RATIONALE FOR DETERMINATION     \"Braulio Guardado is a 65 year old male with PMHx significant for PTSD, previously seeing a trauma therapist, now presenting with persistent blood pressure elevation.\"    Pt was admitted with HTN urgency and his BP is better now. He is having w/u for secondary causes of HTN.    The patient is going to discharge today and I discussed case with Dr Ware and advised observation status.    The severity of illness, intensity of service provided, expected LOS make it appropriate for hospital observation.        The information on this document is developed by the utilization review team in order for the business office to ensure compliance.  This only denotes the appropriateness of proper admission status and does not reflect the quality of care rendered.         The definitions of Inpatient Status and Observation Status used in making the determination above are those provided in the CMS Coverage Manual, Chapter 1 and Chapter 6, " section 70.4.      Sincerely,     SONYA LEI MD    Physician Advisor  Utilization Review/ Case Management  [facility].

## 2020-12-16 NOTE — PROGRESS NOTES
Nephrology Progress note   December 16, 2020      Braulio Guardado MRN:[id number] YOB: 1955  Date of Admission:12/15/2020  Primary care provider: No Ref-Primary, Physician  Requesting physician: Renny Moralez, *  Interval history  ASSESSMENT AND RECOMMENDATIONS:   65-year-old male patient admitted with hypertensive urgency.  On presentation blood pressure 192/113.  Nephrology consulted for further management of uncontrolled blood pressure.    hypertensive urgency  TTE unremarkable with LVEF 65%   Blood pressures are now within acceptable range.  Patient got lisinopril 20 mg today morning along with Coreg 3.125 mg.  In addition patient got Klonopin last night and patient got olanzapine 5 mg today.  Renal ultrasound with Doppler on 12/12/2020 did not show any renal artery stenosis  Cortisol normal  TSH normal  Follow-up pending results: Urine 5 HIAA, renin activity, aldosterone,.  Urine metanephrine    RECOMMENDATIONS    1.  Continue lisinopril 20 mg daily  2.  As blood pressure has significantly improved, we can continue to hold Coreg and agree with primary team's approach to address his anxiety with anxiolytics.  3.  Other alternative approach would be to consider holding patient on clonidine 0.1 mg twice daily if blood pressure remains elevated despite being on lisinopril.  4.  Please refer patient to nephrology clinic for further follow-up of his hypertension in the next 1 to 2 weeks.    Nephrology team will sign off.  Please call us if any questions    Recommendations were communicated to primary team via note and verbal communication  Patient seen and discussed with Dr Vinh Mitchell MD, FACP  Nephrology Fellow   [facility]   Pager [phone]      INTERVAL HISTORY    Patient got lisinopril 20 mg and Coreg 3.125 mg at 10:42 AM today.  In addition patient got Klonopin 0.5 mg last night.  Patient also wants olanzapine 5 mg at 12:29 PM today.  Patient's blood  "pressures are within acceptable range currently.  There is strong concern for anxiety component contributing to his uncontrolled blood pressures.  Denies any chest pain, focal weakness or altered sensation.    Review of system: Four-point review of system unremarkable except for that mentioned in interval history.    MEDICATIONS:  PTA Meds  Prior to Admission medications    Medication Sig Last Dose Taking? Auth Provider   aspirin (ASA) 81 MG chewable tablet Take 1 tablet (81 mg) by mouth daily   Ana Langley MD   atorvastatin (LIPITOR) 40 MG tablet Take 1 tablet (40 mg) by mouth every evening   Ana Langley MD   clopidogrel (PLAVIX) 75 MG tablet Take 1 tablet (75 mg) by mouth daily   Ana Langley MD   hydrOXYzine (ATARAX) 25 MG tablet Take 1 tablet (25 mg) by mouth every 6 hours as needed for other (adjuvant pain)   Shara Kimball APRN CNP   lisinopril (ZESTRIL) 10 MG tablet Take 1 tablet (10 mg) by mouth daily   Shara Kimball APRN CNP      Current Meds    aspirin  81 mg Oral Daily     atorvastatin  40 mg Oral QPM     carvedilol  3.125 mg Oral BID w/meals     clopidogrel  75 mg Oral Daily     lisinopril  20 mg Oral Daily     sodium chloride (PF)  3 mL Intracatheter Q8H     Infusion Meds      ALLERGIES:    No Known Allergies      PHYSICAL EXAM:   Temp  Av.7  F (37.1  C)  Min: 98.6  F (37  C)  Max: 98.7  F (37.1  C)      Pulse  Av.1  Min: 71  Max: 115 Resp  Av.7  Min: 11  Max: 36  SpO2  Av.6 %  Min: 94 %  Max: 97 %       BP (!) 143/84 (BP Location: Left arm)   Pulse 75   Temp 98.1  F (36.7  C) (Oral)   Resp 18   Ht 1.803 m (5' 11\")   Wt 89.6 kg (197 lb 8 oz)   SpO2 96%   BMI 27.55 kg/m        Admit Weight: 90.7 kg (200 lb)     GENERAL APPEARANCE: no  distress, he is  awake  Lungs clear to auscultation with equal breath sounds bilaterally, no clubbing  CV: regular rhythm, normal rate, no rub   - JVD  no   - Edema none  GI: soft, nontender, normal " bowel sounds  NEURO: face symmetric, no  asterixis     LABS:   CMP  Recent Labs   Lab 12/15/20  1056 12/14/20  1746 12/13/20  0653 12/12/20 0924 12/11/20 2225 12/11/20 2225 12/09/20  1709 12/09/20  1709    141 140  --   --  141   < > 140   POTASSIUM 3.7 3.4 3.9 3.6   < > 3.3*   < > 3.2*   CHLORIDE 109 108 109  --   --  108   < > 107   CO2 26 27 25  --   --  25   < > 24   ANIONGAP 5 6 5  --   --  7   < > 9   * 98 93  --   --  119*   < > 106*   BUN 15 13 17  --   --  19   < > 14   CR 0.81 0.89 0.94  --   --  0.90   < > 0.90   GFRESTIMATED >90 >90 86  --   --  >90   < > >90   GFRESTBLACK >90 >90 >90  --   --  >90   < > >90   ALVINA 8.8 8.8 9.0  --   --  9.2   < > 9.2   MAG  --   --  2.5*  --   --  2.5*  --  2.6*   PROTTOTAL  --   --   --   --   --  7.5  --  7.5   ALBUMIN  --   --   --   --   --  3.9  --  3.9   BILITOTAL  --   --   --   --   --  0.7  --  0.6   ALKPHOS  --   --   --   --   --  102  --  101   AST  --   --   --   --   --  20  --  15   ALT  --   --   --   --   --  34  --  25    < > = values in this interval not displayed.     CBC  Recent Labs   Lab 12/15/20  1056 12/14/20  1746 12/13/20 0653 12/11/20 2225   HGB 16.1 16.7 15.6 16.5   WBC 7.9 10.0 7.9 8.3   RBC 5.36 5.53 5.18 5.50   HCT 47.3 48.6 46.4 48.4   MCV 88 88 90 88   MCH 30.0 30.2 30.1 30.0   MCHC 34.0 34.4 33.6 34.1   RDW 12.1 11.9 12.2 12.3    258 257 263     INR  Recent Labs   Lab 12/09/20  1709   INR 1.04     ABGNo lab results found in last 7 days.   URINE STUDIES  Recent Labs   Lab Test 12/12/20  0042   COLOR Yellow   APPEARANCE Clear   URINEGLC Negative   URINEBILI Negative   URINEKETONE Negative   SG 1.018   UBLD Trace*   URINEPH 5.5   PROTEIN 10*   NITRITE Negative   LEUKEST Negative   RBCU 4*   WBCU 1     No lab results found.  PTH  No lab results found.  IRON STUDIES  No lab results found.    IMAGING:  All imaging studies reviewed by me.     Paula Barraza MD

## 2020-12-17 ENCOUNTER — PATIENT OUTREACH (OUTPATIENT)
Dept: CARE COORDINATION | Facility: CLINIC | Age: 65
End: 2020-12-17

## 2020-12-17 VITALS
HEIGHT: 71 IN | TEMPERATURE: 98.3 F | WEIGHT: 199.7 LBS | OXYGEN SATURATION: 97 % | HEART RATE: 77 BPM | DIASTOLIC BLOOD PRESSURE: 96 MMHG | SYSTOLIC BLOOD PRESSURE: 151 MMHG | BODY MASS INDEX: 27.96 KG/M2 | RESPIRATION RATE: 16 BRPM

## 2020-12-17 PROCEDURE — 250N000013 HC RX MED GY IP 250 OP 250 PS 637: Performed by: STUDENT IN AN ORGANIZED HEALTH CARE EDUCATION/TRAINING PROGRAM

## 2020-12-17 PROCEDURE — G0378 HOSPITAL OBSERVATION PER HR: HCPCS

## 2020-12-17 PROCEDURE — 99239 HOSP IP/OBS DSCHRG MGMT >30: CPT | Mod: GC | Performed by: INTERNAL MEDICINE

## 2020-12-17 RX ORDER — CARVEDILOL 3.12 MG/1
3.12 TABLET ORAL 2 TIMES DAILY WITH MEALS
Qty: 60 TABLET | Refills: 0 | Status: ON HOLD | OUTPATIENT
Start: 2020-12-17 | End: 2020-12-25

## 2020-12-17 RX ORDER — LISINOPRIL 20 MG/1
20 TABLET ORAL DAILY
Qty: 30 TABLET | Refills: 0 | Status: SHIPPED | OUTPATIENT
Start: 2020-12-18 | End: 2021-03-09

## 2020-12-17 RX ORDER — LORAZEPAM 0.5 MG/1
0.5 TABLET ORAL
Status: COMPLETED | OUTPATIENT
Start: 2020-12-17 | End: 2020-12-17

## 2020-12-17 RX ORDER — LORAZEPAM 0.5 MG/1
0.5 TABLET ORAL EVERY 8 HOURS PRN
Qty: 12 TABLET | Refills: 0 | Status: SHIPPED | OUTPATIENT
Start: 2020-12-17 | End: 2020-12-21

## 2020-12-17 RX ADMIN — LISINOPRIL 20 MG: 20 TABLET ORAL at 08:28

## 2020-12-17 RX ADMIN — ASPIRIN 81 MG CHEWABLE TABLET 81 MG: 81 TABLET CHEWABLE at 08:28

## 2020-12-17 RX ADMIN — HYDROXYZINE HYDROCHLORIDE 25 MG: 25 TABLET, FILM COATED ORAL at 12:41

## 2020-12-17 RX ADMIN — CARVEDILOL 3.12 MG: 3.12 TABLET, FILM COATED ORAL at 08:29

## 2020-12-17 RX ADMIN — CLOPIDOGREL BISULFATE 75 MG: 75 TABLET ORAL at 08:28

## 2020-12-17 RX ADMIN — LORAZEPAM 0.5 MG: 0.5 TABLET ORAL at 12:58

## 2020-12-17 ASSESSMENT — MIFFLIN-ST. JEOR: SCORE: 1722.96

## 2020-12-17 ASSESSMENT — ACTIVITIES OF DAILY LIVING (ADL): DEPENDENT_IADLS:: INDEPENDENT

## 2020-12-17 NOTE — DISCHARGE INSTRUCTIONS
Mental Health Provider Resources:  [facility] Mental Health Providers: https://www.[facility].com/care/find/doctors/mental-behavioral-health/  [facility] Mental Health Providers: https://www.[place].org/services/counseling-centers  [facility] Mental Health Treatment: https://www.[facility].org/care/overarching-care/behavioral-health-and-mental-health-adult/mental-health-treatment-programs  [facility]: https://provider.[place]/ines/public/#/one/city=&state=&postalCode=&country=&insurerCode=BCBSA_I&brandCode=BCBSANDHF&alphaPrefix=&bcbsaProductId/results/allRemote=false&alphaPrefix=LMN&isPromotionSearch=false&key=&location=[place]25[place]&maxLatitude=&maxLongitude=&minLatitude=&minLongitude=&page=1&patientAge=&providerType=&query=mental%2520health&radius=25&searchType=default&searchCategory=SPECIALTY&sort=DEFAULT&waitForOop=false&doWebAlert=true&productCode=&filter=SPECIALTY%253AMental%2520Health%2520Counseling    [facility]  [phone] (https://www.[place].us/content/mental-health-crisis-services)  https://www.[facility].us/residents/health-medical/clinics-services/mental-health/adult-mental-health    hospitals Ur[facility] for Mental Health Services  [phone] (Phone Calls are answer 24/7)  [address] [place] [address]  Hours  Monday through Friday, 8 a.m. - 5:30 p.m.  Closed on Saturday, Sunday and holidays.  https://www.[place].us/residents/health-medical/clinics-services/mental-behavorial-health/adult-mental-health-chemical-health/urgent-care-adult-mental-health    Suicide Hotline   24 hours / 7 days  (462) 256-1722   Crisis Program    National Suicide Prevention Hotline  1-997.316.2049  Https://suicidepreventionlifeline.org/    [place] Choices Assessment for Additional Resources  https://www.[facility].mn.us/main/idcplg?IdcService=GET_DYNAMIC_CONVERSION&RevisionSelectionMethod=LatestReleased&dDocName=[facility]350057    [facility]  Support Groups & Providers  https://www.psychologytoday.com/us/groups/trauma-and-ptsd/[place]      Primary Care Provider has been established at:  [facility] [id number] [place] [id number]    With Provider: CHRISTEL Moura  A follow-up visit has been scheduled for you for December 24, 2020 at 9:30am with the clinic above.

## 2020-12-17 NOTE — CONSULTS
Care Management Initial Consult    General Information  Assessment completed with: Patient,    Type of CM/SW Visit: Initial Assessment    Primary Care Provider verified and updated as needed: No   Readmission within the last 30 days: yes    Reason for Consult: discharge planning, mental health concerns, community resources, emotional/coping/adjustment concerns  Advance Care Planning: Advance Care Planning Reviewed: (Not discussed)       Communication Assessment  Patient's communication style: spoken language (English or Bilingual)    Hearing Difficulty or Deaf: no   Wear Glasses or Blind: no    Cognitive  Cognitive/Neuro/Behavioral: WDL  Level of Consciousness: alert  Arousal Level: opens eyes spontaneously  Orientation: oriented x 4  Mood/Behavior: calm, cooperative, behavior appropriate to situation  Best Language: 0 - No aphasia  Speech: clear, spontaneous, logical    Living Environment:   People in home: alone     Current living Arrangements: house      Able to return to prior arrangements: yes    Family/Social Support:  Care provided by: self  Provides care for: no one  Marital Status: Single  Sibling(s)(Friend (Sparkle))          Description of Support System:        Current Resources:   Skilled Home Care Services:    Community Resources: None  Equipment currently used at home: none  Supplies currently used at home: None    Employment/Financial:  Employment Status: unemployed        Financial Concerns: No concerns identified     Lifestyle & Psychosocial Needs:     Socioeconomic History     Marital status: Single     Spouse name: Not on file     Number of children: Not on file     Years of education: Not on file     Highest education level: Not on file     Tobacco Use     Smoking status: Never Smoker     Smokeless tobacco: Never Used   Substance and Sexual Activity     Alcohol use: Never     Frequency: Never     Drug use: Yes     Types: Marijuana     Functional Status:  Prior to admission patient needed  "assistance:   Dependent ADLs:: Independent  Dependent IADLs:: Independent  Assesssment of Functional Status: At functional baseline    Mental Health Status:  Mental Health Status: Current Concern  Mental Health Management: (Pt has a dx of PTSD, anxiety, and depression)    Chemical Dependency Status:  Chemical Dependency Status: No Current Concerns           Values/Beliefs:  Spiritual, Cultural Beliefs, Mu-ism Practices, Values that affect care: yes  Description of Beliefs that Will Affect Care: Practicing Mosque     Care Management Follow Up    Length of Stay (days): 1    Expected Discharge Date: 12/17/20     Concerns to be Addressed: Mental Health, Community Resources  Patient plan of care discussed at interdisciplinary rounds: Yes    Anticipated Discharge Disposition: Home   Anticipated Discharge Services: TBD, PCP referral made to [facility].  Anticipated Discharge DME:  None    Patient/family educated on Medicare website which has current facility and service quality ratings:  N/A  Education Provided on the Discharge Plan:  N/A  Patient/Family in Agreement with the Plan: N/A    Referrals Placed by CM/SW:  SW made PCP referral to [facility].   Private pay costs discussed: Not applicable    Additional Information:  SW spoke with pt to introduce self, role, and to complete an initial assessment with pt. SW asked if she could ask pt some questions and he answered yes. SW verified that pt lives in [place], in a house. Pt lives alone, with his dog and has been in the same house for 9 years.    Pt shared that his sister has been staying at his house the past few days to help out but will be leaving either today or tomorrow. Pt shared that he loves his sister very much and appreciates her coming to town to help him but she is not \"very empathetic\" d/t family history. Pt reports that he does not have a big support system as he is unable to maintain relationships. Pt reported that his friend Sparkle is a " big support to him and is watching his dog while he is hospitalized. Pt shared that he is an overall anxious person and can get anxious often. Pt confirms that he is in a safe place but his anxiety and depression can make things hard. Pt reported that has had 2 panic attacks in the past month and his last panic attack was after he was discharged from his previous hospital stay. Pt reported that he got overwhelmed with figuring out his blood pressure cuff and that led to a high blood pressure that required him to come to the hospital.    SW asked if pt would be open to getting resources on local support groups surrounding PTSD and pt reported yes. Pt would like a support group that is managed by a mental health professional because he shares that he tends to take on the anxiety of other group members as well. Pt would like a group where he can process with the group and one that is not dominated by group members.    Pt was seeing a private therapist two years ago prior to her senior living and has not gone to therapy since. Pt was informed that [facility] has providers and pt would like the therapists within the [facility] network. SW to provide pt with therapists within network. SW will also provide pt the [place] link for therapists and for additional therapists focusing on trauma. SW to also include link for a [employer] Assessment. Pt reports that he is unemployed and his sister manages his finance. Pt reported no concerns about finance currently but will be planning on discussing his choices with the [employer] worker through [employer].     Pt reports that he sees an osteopathic provider (Dr. Perez) but other than that, does not have any other community resources. Pt denies utilizing any home equipments. Pt reports to have IADLs. When asked if pt had any thoughts of harming himself or others, pt reported no. Pt reported that he had suicidal thoughts the other night but had no intentions on acting out. SW will  provide pt the suicide hotline number as a resource so he can have it available when he is out of the hospital. Pt denied any chemical dependency usuage or concerns currently.    Pt would like a referral to be made for the [facility]. Pt reports that he would like to see a specific provider at the clinic. SW to call clinic and make referral for an appointment next week.    ADDENDUM 1150  SW informed pt that the [facility] is closed temporarily. SW asked pt if there was another location he would be open to going as the clinic on [facility] Is far from him. Pt asked to be referred to [facility]. Pt asked that the provider be female and have a holistic approach. Pt understands that the holistic approach might be hard to find and wants to ensure that provider is female primarily.     CHUY scheduled an appointment for pt with provider Lisbeth Nagel at [facility], for December 24th, 2021 at 9:30am.    ADELFO Hendrix, [facility]  Acute Care Float   [facility]  Phone: [phone]  Pager: [phone]

## 2020-12-17 NOTE — PROGRESS NOTES
Pt discharged to: private residence  Via: private vehicle  Accompanied by: his sister  Belongings: packed up per pt and taken with him  Teaching: pt instructed to use atarax and ativan to control anxiety, report to ER if BP persists >200/90 after taking medications  Clinic appointment: pt made appts  Report called/faxed: n/a  Local housing: n/a    PIV removed prior to discharge. Pt will stop at discharge pharmacy to  meds on his way out.

## 2020-12-17 NOTE — PLAN OF CARE
"PRIMARY DIAGNOSIS: \"GENERIC\" NURSING  OUTPATIENT/OBSERVATION GOALS TO BE MET BEFORE DISCHARGE:  ADLs back to baseline: Yes    Activity and level of assistance: Ambulating independently.    Pain status: Pain free.    Return to near baseline physical activity: Yes     Discharge Planner Nurse   Safe discharge environment identified: Yes  Barriers to discharge: Yes    Observation goals:  -- Seen and evaluated by psychiatry - completed  -- Seen and evaluated by health psychology - not ordered  -- Completion of inpatient renal work-up - nephrology signed off, awaiting sendout test results will follow-up outpatient  -- BP goal <150/90- /93  Pt complains of mild anxiety but declines intervention. Plan for him to stay overnight to monitor BP and likely discharge tomorrow.         Entered by: Suzi Ferreira 12/16/2020 6:52 PM     Please review provider order for any additional goals.   Nurse to notify provider when observation goals have been met and patient is ready for discharge.  Problem: Adult Inpatient Plan of Care  Goal: Plan of Care Review  12/16/2020 1852 by Suzi Ferreira, RN  Outcome: Improving  12/16/2020 1453 by Suzi Ferreira, RN  Outcome: Improving  Flowsheets (Taken 12/16/2020 1453)  Plan of Care Reviewed With: patient  Progress: improving     "

## 2020-12-17 NOTE — PROVIDER NOTIFICATION
Paged Dr. Ware:  /101, pt is anxious at this time after multiple phone calls regarding PCP and insurance, I gave him atarax and plan to recheck in an hour.    Orders received for one time dose of ativan 0.5mg PO.

## 2020-12-17 NOTE — PROGRESS NOTES
"Observation goals:  -- Seen and evaluated by psychiatry - completed  -- Seen and evaluated by health psychology - not ordered  -- Completion of inpatient renal work-up - nephrology signed off, awaiting sendout test results will follow-up outpatient  -- BP goal <150/90- /89     Discharge Planner Nurse   Safe discharge environment identified: Yes  Barriers to discharge: Yes     Please review provider order for any additional goals.   Nurse to notify provider when observation goals have been met and patient is ready for discharge        PRIMARY DIAGNOSIS: \"GENERIC\" NURSING  OUTPATIENT/OBSERVATION GOALS TO BE MET BEFORE DISCHARGE:  1. ADLs back to baseline: Yes     2. Activity and level of assistance: Ambulating independently.     3. Pain status: Pain free.     4. Return to near baseline physical activity: Yes        VSS. BP well controlled. Denies pain, nausea, dizziness and chest pain. Patient complained of mild anxiety. Klonopin given per pt request. Slept well thereafter. Up ad marcela and voiding freely. Still for social work consult. Plan for possible discharge today.  "
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
## 2020-12-17 NOTE — PROVIDER NOTIFICATION
Paged Dr. Ware:  Pt /96. are you comfortable discharging with this blood pressure?    Spoke with provider: comfortable with discharging. Pt instructed to take blood pressure meds as ordered and if increased anxiety or blood pressure to try atarax and/or ativan, if blood pressure remains elevated, please call.

## 2020-12-17 NOTE — PROGRESS NOTES
"Observation goals:  -- Seen and evaluated by psychiatry - completed  -- Seen and evaluated by health psychology - not ordered  -- Completion of inpatient renal work-up - nephrology signed off, awaiting sendout test results will follow-up outpatient  -- BP goal <150/90- /77     Discharge Planner Nurse   Safe discharge environment identified: Yes  Barriers to discharge: Yes     Please review provider order for any additional goals.   Nurse to notify provider when observation goals have been met and patient is ready for discharge        PRIMARY DIAGNOSIS: \"GENERIC\" NURSING  OUTPATIENT/OBSERVATION GOALS TO BE MET BEFORE DISCHARGE:  1. ADLs back to baseline: Yes     2. Activity and level of assistance: Ambulating independently.     3. Pain status: Pain free.     4. Return to near baseline physical activity: Yes  "

## 2020-12-17 NOTE — PROGRESS NOTES
"Observation goals:  -- Seen and evaluated by psychiatry - completed  -- Seen and evaluated by health psychology - not ordered  -- Completion of inpatient renal work-up - nephrology signed off, awaiting sendout test results will follow-up outpatient  -- BP goal <150/90- /87    Discharge Planner Nurse   Safe discharge environment identified: Yes  Barriers to discharge: Yes    Please review provider order for any additional goals.   Nurse to notify provider when observation goals have been met and patient is ready for discharge       PRIMARY DIAGNOSIS: \"GENERIC\" NURSING  OUTPATIENT/OBSERVATION GOALS TO BE MET BEFORE DISCHARGE:  1. ADLs back to baseline: Yes     2. Activity and level of assistance: Ambulating independently.     3. Pain status: Pain free.     4. Return to near baseline physical activity: Yes  "

## 2020-12-18 LAB
5HIAA & CREATININE UR-IMP: NORMAL
5OH-INDOLEACETATE 24H UR-MCNC: 7.8 MG/L
5OH-INDOLEACETATE 24H UR-MRATE: NORMAL MG/D (ref 0–15)
5OH-INDOLEACETATE/CREAT 24H UR: 4 MG/GCR (ref 0–14)
ALDOST SERPL-MCNC: 15.9 NG/DL (ref 0–31)
COLLECT DURATION TIME UR: NORMAL HR
CREAT 24H UR-MRATE: NORMAL MG/D (ref 800–2100)
CREAT SERPL-MCNC: 207 MG/DL
SPECIMEN VOL ?TM UR: NORMAL ML

## 2020-12-18 NOTE — PROGRESS NOTES
"[facility]: Post-Discharge Note  SITUATION                                                      Admission:    Admission Date: 12/15/20   Reason for Admission: Hypertensive urgency  Discharge:   Discharge Date: 12/17/20  Discharge Diagnosis: Hypertensive urgency    BACKGROUND                                                      Braulio Guardado is a 65 year old male with PMHx significant for PTSD, previously seeing a trauma therapist, now presenting with persistent blood pressure elevation    ASSESSMENT      Discharge Assessment  Patient reports symptoms are: Unchanged(BP was \"high this morning but I am just going to chill out over the weekend and follow up with my PCP on Tuesday\".)  Does the patient have all of their medications?: Yes  Does patient know what their new medications are for?: Yes  Does patient have a follow-up appointment scheduled?: Yes  Does patient have any other questions or concerns?: No    Post-op  Did the patient have surgery or a procedure: No  Fever: No  Chills: No  Eating & Drinking: eating and drinking without complaints/concerns  PO Intake: regular diet  Bowel Function: normal  Urinary Status: voiding without complaint/concerns    PLAN                                                      Outpatient Plan:      1.Follow up with your primary care provider within 1 week of discharge.  2. Follow-up with nephrology within 1-2 weeks of discharge.  3. Follow-up with health psychology within 1-2 weeks of discharge.    Future Appointments   Date Time Provider Department Center   12/21/2020  9:00 AM Sugar Gordillo MD [facility]   12/24/2020  9:30 AM Lisbeth Nagel APRN CNP [facility]   12/28/2020  8:45 AM Elvira Aquino, PT [facility]   12/29/2020  9:15 AM Olive Emerson, OT [facility]   1/13/2021  1:30 PM Kerri Villanueva, NP [facility]           Lee Ann Meng, [facility]                "

## 2020-12-20 NOTE — DISCHARGE SUMMARY
[facility]   Discharge Summary - Medicine & Pediatrics       Date of Admission:  12/15/2020  Date of Discharge:  12/17/2020  4:13 PM  Discharging Provider: David Ware MD  Discharge Service: Lucero Badillo    Discharge Diagnoses   Hypertensive urgency  Anxiety  PTSD    Chronic issues:  Multiple subacute and chronic CVAs    Follow-ups Needed After Discharge   Follow-up Appointments     Adult [facility] Follow-up and recommended labs and tests      1.Follow up with your primary care provider within 1 week of discharge.  2. Follow-up with nephrology within 1-2 weeks of discharge.  3. Follow-up with health psychology within 1-2 weeks of discharge.    Appointments on [place] and/or [facility] (with [facility] or [facility]   provider or service). Call [phone] if you haven't heard regarding   these appointments within 7 days of discharge.             Unresulted Labs Ordered in the Past 30 Days of this Admission     Date and Time Order Name Status Description    12/15/2020 1902 Renin activity In process     12/15/2020 1902 Aldosterone Renin Ratio In process     12/13/2020 1300 Metanephrine random or 24 hr urine In process       These results will be followed up by nephrology    Discharge Disposition   Discharged to home  Condition at discharge: Stable    Hospital Course   Braulio Guardado is a 65 year old male with PMHx significant for PTSD, previously seeing a trauma therapist, who presented to the emergency department with significant blood pressure elevation and anxiety and was subsequently admitted for further assessment and treatment/monitoring.     #Hypertensive urgency  Patient noted to have elevated blood pressure without evidence of end-organ damage. Blood pressure at the time of presentation was 192/113, he was given Hydroxyzine and his BP subsequently decreased to 155/126. Although anxiety may be playing a role in his elevated blood pressures, given the persistence of  "his symptoms, a secondary hypertension work-up was initiated, including work-up to assess for pheochromocytoma.  A renal ultrasound with Doppler was negative for renal artery stenosis.  He was started on Coreg 3.125 mg p.o., his lisinopril was increased to 20 mg p.o. daily, and he was started on as needed Klonopin.  A TSH and free cortisol were also obtained, and both were within normal limits.  He was discharged with nephrology follow-up within 1 to 2 weeks and given return precautions.     #Anxiety  #History of PTSD  Patient notes a history of PTSD, and had previously been in trauma focused therapy for this 2 years ago. Patient had previously been started on escitalopram, but discontinued after 2 doses due to medication side effects (suicidality).  He was also previously on Prozac and experienced similar side effects.  During his admission he was seen by psychiatry, who recommended short-term use of Klonopin versus lorazepam.  He was discharged with instructions to follow-up with health psychology, [place], and to follow-up with nephrology as above.  At the time of discharge his blood pressure was in acceptable limits.    Multiple subacute-chronic CVAs  Multiple areas of infarct of various ages noted during 12/9/2020 admission. At that time, \"Neurologic exam largely normal other than some mild numbness and tingling in the left fingers, and per Neurology this may be related to focal ulnar nerve issues\" per documentation. He was continued on his plavix throughout admission.     Plan of care discussed with attending physician Dr. COLLEEN Moralez.      David Slade MD  [place] [address] Service  PGY-3 Internal Medicine-Pediatrics  [facility]         Consultations This Hospital Stay   NEPHROLOGY GENERAL ADULT IP CONSULT  MEDICATION HISTORY IP PHARMACY CONSULT  PSYCHIATRY IP CONSULT  SOCIAL WORK IP CONSULT  PSYCHOLOGY ADULT IP CONSULT  PSYCHOLOGY ADULT IP CONSULT    Code " Status   Full Code       ______________________________________________________________________    Physical Exam   Vital Signs:                   Weight: 199 lbs 11.2 oz  General Appearance: Well developed, well nourished, in no acute distress.   Skin: No rashes, ulcerations, or petechiae.  HEENT: PERRLA, EOMI intact, anicteric sclera, clear conjunctiva, normal external ear anatomy, normal nose anatomy, no lesions, scars, or masses. Normal mucosa lips, teeth, and gums. The oral mucosa, hard and soft palate, tongue and posterior pharynx were normal.  Cardiovascular: RRR, no murmurs, rubs, or gallops.   Lungs: clear to ascultation bilaterally, no rales, wheezes or rhonchi.   Abdomen: Non-tender, non-distended abdomen, normal bowel sounds.  Musculoskeletal: Normal gait, no tenderness or effusions noted. Muscle strength and tone were normal.  Extremities: No cyanosis, clubbing or edema.  Neurologic: Alert and oriented x 3. Normal affect. CX II-XII grossly intact. Normal sensation.        Primary Care Physician   Lisbeth Nagel    Discharge Orders      Reason for your hospital stay    You were hospitalized for elevated blood pressure. You were treated with anti-hypertensive medication during your stay. You are being discharged with outpatient     Reason for your hospital stay    You were admitted for hypertension and anxiety. You were treated with medication to help lower your blood pressure and control your anxiety. Your blood pressure improved during your hospitalization. You were seen by nephrology to figure out the cause of your hypertension.     Adult [facility] Follow-up and recommended labs and tests    1.Follow up with your primary care provider within 1 week of discharge.  2. Follow-up with nephrology within 1-2 weeks of discharge.  3. Follow-up with health psychology within 1-2 weeks of discharge.    Appointments on [place] and/or [facility] (with [facility] or [facility] provider or service). Call [phone]  if you haven't heard regarding these appointments within 7 days of discharge.     Activity    Your activity upon discharge: activity as tolerated     When to contact your care team    Call your primary doctor if you have any of the following:  increased shortness of breath, BPs >200/>90.     Full Code     Diet    Follow this diet upon discharge: Orders Placed This Encounter      Combination Diet Regular Diet Adult       Significant Results and Procedures   Results for orders placed or performed during the hospital encounter of 12/11/20   US Renal Complete w Duplex Complete    Narrative    Exam: Duplex Doppler ultrasound of the kidneys and bilateral renal  arteries dated 12/12/2020 11:21 AM    Comparison Study: No direct comparisons are available    Clinical Information: Recent diagnosis of HTN, evaluate for renal  artery stenosis    Technique: B-mode (grayscale) and duplex Doppler evaluation of the  abdominal aorta and renal arteries performed. Velocity measurements  obtained with angle correction at or less than 60 degrees.    Findings:    The right kidney measures 12.2 cm in length. The left kidney measures  11.7 cm in length. Cortical thickness and echogenicity both kidneys is  normal. No evidence of stones, masses or hydronephrosis. Urinary  bladder is unremarkable.    Suprarenal abdominal aorta: 98 cm/sec  Infrarenal abdominal aorta: 92 cm/sec     Right renal artery velocities:  Origin: Not well-visualized   Proximal renal artery: 49 cm/sec   Mid renal artery: 80 cm/sec   Hilum/distal renal artery: 42 cm/sec    Resistive indices in the arcuate arteries:   Inferior pole: 0.63  Mid pole: 0.64  Superior pole: 0.60    Renal artery to aorta ratio: <1      Left renal artery velocities:  Origin: 93 cm/sec   Proximal renal artery: Not well-visualized   Mid renal artery: 74 cm/sec   Hilum/distal renal artery: 66 cm/sec    Resistive indices in the arcuate arteries:   Inferior pole: .061  Mid pole: 0.60  Superior pole:  0.61    Renal artery to aorta ratio: <1        Impression    Impression:  No evidence of renal artery stenosis although unable to visualize the  right renal artery origin.      I have personally reviewed the examination and initial interpretation  and I agree with the findings.    GENEVIEVE PENALOZA MD     Most Recent 3 Troponin's:  Recent Labs   Lab Test 12/15/20  1056 12/14/20  1746 12/11/20  2225   TROPI <0.015 <0.015 <0.015     Most Recent TSH and T4:  Recent Labs   Lab Test 12/15/20  1056   TSH 2.22        Discharge Medications   Discharge Medication List as of 12/17/2020  2:57 PM      START taking these medications    Details   carvedilol (COREG) 3.125 MG tablet Take 1 tablet (3.125 mg) by mouth 2 times daily (with meals), Disp-60 tablet, R-0, E-Prescribe      LORazepam (ATIVAN) 0.5 MG tablet Take 1 tablet (0.5 mg) by mouth every 8 hours as needed for anxiety, Disp-12 tablet, R-0, Local Print         CONTINUE these medications which have CHANGED    Details   lisinopril (ZESTRIL) 20 MG tablet Take 1 tablet (20 mg) by mouth daily, Disp-30 tablet, R-0, E-Prescribe         CONTINUE these medications which have NOT CHANGED    Details   aspirin (ASA) 81 MG chewable tablet Take 1 tablet (81 mg) by mouth daily, Disp-30 tablet, R-1, E-Prescribe      atorvastatin (LIPITOR) 40 MG tablet Take 1 tablet (40 mg) by mouth every evening, Disp-30 tablet, R-1, E-Prescribe      clopidogrel (PLAVIX) 75 MG tablet Take 1 tablet (75 mg) by mouth daily, Disp-90 tablet, R-0, E-Prescribe      Cyanocobalamin (VITAMIN B 12 PO) Take 1 tablet by mouth daily, Historical      hydrOXYzine (ATARAX) 25 MG tablet Take 1 tablet (25 mg) by mouth every 6 hours as needed for other (adjuvant pain), Disp-20 tablet, R-0, E-Prescribe           Allergies   No Known Allergies      Staff Addendum to Discharge Summary  Date of Service: 12/17/2020  I have seen and examined the patient, reviewed the data and discussed the plan of care with the service team.  I  agree with the above documentation.    >30 minutes spent in discharge, including >50% in counseling and coordination of care, medication review and plan of care recommended on follow up. Questions were answered at length with patient including medication counseling and follow-up care.     Lisbeth Styles  (PCP) was contacted electronically  at the time of discharge, so as to bridge from hospital to outpatient care.   It was our pleasure to care for the patient during this hospitalization. Please do not hesitate to contact me should there be questions regarding the hospital course or discharge plan.      Renny Moralez MD   of Medicine  [facility]

## 2020-12-21 ENCOUNTER — VIRTUAL VISIT (OUTPATIENT)
Dept: NEUROLOGY | Facility: CLINIC | Age: 65
End: 2020-12-21
Payer: COMMERCIAL

## 2020-12-21 DIAGNOSIS — I63.81 CEREBROVASCULAR ACCIDENT (CVA) DUE TO OCCLUSION OF SMALL ARTERY (H): ICD-10-CM

## 2020-12-21 DIAGNOSIS — I10 UNCONTROLLED HYPERTENSION: ICD-10-CM

## 2020-12-21 DIAGNOSIS — R20.0 LEFT ARM NUMBNESS: Primary | ICD-10-CM

## 2020-12-21 PROCEDURE — 99215 OFFICE O/P EST HI 40 MIN: CPT | Mod: 95 | Performed by: PSYCHIATRY & NEUROLOGY

## 2020-12-21 RX ORDER — AMLODIPINE BESYLATE 5 MG/1
TABLET ORAL
COMMUNITY
Start: 2020-12-11 | End: 2020-12-21

## 2020-12-21 RX ORDER — LISINOPRIL 10 MG/1
TABLET ORAL
COMMUNITY
Start: 2020-12-13 | End: 2020-12-21

## 2020-12-21 NOTE — PROGRESS NOTES
"Braulio Guardado is a 65 year old male who is being evaluated via a billable video visit.      The patient has been notified of following:     \"This video visit will be conducted via a call between you and your physician/provider. We have found that certain health care needs can be provided without the need for an in-person physical exam.  This service lets us provide the care you need with a video conversation.  If a prescription is necessary we can send it directly to your pharmacy.  If lab work is needed we can place an order for that and you can then stop by our lab to have the test done at a later time.    Video visits are billed at different rates depending on your insurance coverage.  Please reach out to your insurance provider with any questions.    If during the course of the call the physician/provider feels a video visit is not appropriate, you will not be charged for this service.\"    Patient has given verbal consent for Video visit? Yes  How would you like to obtain your AVS? MyChart  If you are dropped from the video visit, the video invite should be resent to: send to phone number: [phone]  Will anyone else be joining your video visit? No      Video-Visit Details    Type of service:  Video Visit    Video time: 45 minutes    Originating Location (pt. Location): Home    Distant Location (provider location):  [facility] [place]     Platform used for Video Visit: Jarvis Bolivar          [facility]    Braulio Guardado MRN# [id number]   Age: 65 year old YOB: 1955     Requesting physician: No ref. provider found  Yoko Rdz            Assessment and Plan:   Assessment:  1. Ischemic strokes-likely due to uncontrolled hypertension  2. Left hand numbness ulnar neuropathy vs stroke sequela     Plan:  I reviewed the risk factors for stroke and goals of treatment with the patient  SBP goal < 135, DBP < 85 long term, ay take weeks " to reach  LDL goal < 100 even if at goal should continue on statin. Recheck lipid profile in 6 months  Plavix and Aspirin for 90 days and then reduce down to ASA 81 mg daily only  Reviewed the importance of exercise    He will go to the ER if he has new acute symptoms suggestive of a new stroke    He will go for an EMG of the left upper extremity to make sure we aren't missing an ulnar neuropathy in addition to the strokes.     45 minutes with the patient over 50% counseling.  Sugar Gordillo MD           History of Present Illness:     chief complaint:   Chief Complaint   Patient presents with     Consult     VIDEO VISIT JOSHUA        Braulio Guardado is a 65 year old right-handed gentleman who is scheduled for a visit to discuss left arm numbness. He was originally scheduled to see me and when he called to report worsening symptoms and was asking for an earlier appointment he was seen by Nabil Mejia and Ana 12/9/2020. At the time of the appointment he was noted to have hypertensive urgency blood pressure 228/145, pulse 107. EKG showed sinus tachycardia. He was sent on to the ER and admitted. He has had several return trips to the ED and hospital since then for uncontrolled BP. This morning he reports blood pressure is still high but getting better. He has systolics in the 170s. He also acknowledges that the stress and anxiety of having high blood pressure doesn't help and his numbers do get better when he is able to calm himself and relax.     While hospitalized he had an MRI brain which revealed subacute strokes in the right frontoparietal junction and occipital lobe. Also in the right centrum semiovale. In addition chronic sequelae of microvascular ischemic disease was seen. The patient is on aspirin and Plavix for 90 days to then switch to aspirin. He has been started on lipid therapy with atorvastatin and multiple blood pressure medicines have been started. He meets with a new PCP tomorrow in [facility],     He  continues to report left hand numbness. Mainly in the 4th and 5th digit. He also feels he has less strength in hte hand. Position seems to make it worse such as elevation and when he sleeps on his arm. He does have some neck pain as well.            Physical Exam:   Limited due to video. He took BP this morning and reports it was 178/112    General Appearance:  Well groomed and cooperative with examination, no acute distress  Neurological Examination:  Muscle bulk looks normal. He denies tinels at the wrist and phalens is negative. He does have some tenderness to palpation around the left medial elbow.               Data:   All laboratory data reviewed  All imaging studies reviewed by me             DATA for DOCUMENTATION:         Past Medical History:     Patient Active Problem List   Diagnosis     Left upper extremity numbness     Abnormal head CT     Hypertensive urgency     Anxiety     Essential hypertension     Benign essential hypertension     Exposure to SARS-associated coronavirus     Hypertension, uncontrolled     Past Medical History:   Diagnosis Date     Cerebral infarction (H)      Hyperlipidemia      Hypertension        Also see scanned health assessment forms.       Past Surgical History:   No past surgical history on file.         Social History:     Social History     Socioeconomic History     Marital status: Single     Spouse name: Not on file     Number of children: Not on file     Years of education: Not on file     Highest education level: Not on file   Occupational History     Not on file   Social Needs     Financial resource strain: Not on file     Food insecurity     Worry: Not on file     Inability: Not on file     Transportation needs     Medical: Not on file     Non-medical: Not on file   Tobacco Use     Smoking status: Never Smoker     Smokeless tobacco: Never Used   Substance and Sexual Activity     Alcohol use: Never     Frequency: Never     Drug use: Yes     Types: Marijuana     Sexual  activity: Not on file   Lifestyle     Physical activity     Days per week: Not on file     Minutes per session: Not on file     Stress: Not on file   Relationships     Social connections     Talks on phone: Not on file     Gets together: Not on file     Attends Gnosticism service: Not on file     Active member of club or organization: Not on file     Attends meetings of clubs or organizations: Not on file     Relationship status: Not on file     Intimate partner violence     Fear of current or ex partner: Not on file     Emotionally abused: Not on file     Physically abused: Not on file     Forced sexual activity: Not on file   Other Topics Concern     Not on file   Social History Narrative     Not on file              Family History:   No family history on file.         Medications:     Current Outpatient Medications   Medication Sig     aspirin (ASA) 81 MG chewable tablet Take 1 tablet (81 mg) by mouth daily     atorvastatin (LIPITOR) 40 MG tablet Take 1 tablet (40 mg) by mouth every evening     clopidogrel (PLAVIX) 75 MG tablet Take 1 tablet (75 mg) by mouth daily     Cyanocobalamin (VITAMIN B 12 PO) Take 1 tablet by mouth daily     lisinopril (ZESTRIL) 20 MG tablet Take 1 tablet (20 mg) by mouth daily     carvedilol (COREG) 25 MG tablet Take 1 tablet (25 mg) by mouth 2 times daily (with meals)     clonazePAM (KLONOPIN) 0.5 MG tablet Take 1 tablet (0.5 mg) by mouth At Bedtime     LORazepam (ATIVAN) 0.5 MG tablet Take 0.5 mg by mouth every 8 hours as needed for anxiety     OLANZapine (ZYPREXA) 5 MG tablet Take 0.5-1 tablets (2.5-5 mg) by mouth nightly as needed (Anxiety)     No current facility-administered medications for this visit.               Review of Systems:   A comprehensive 10 point review of systems (constitutional, ENT, cardiac, peripheral vascular, lymphatic, respiratory, GI, , Musculoskeletal, skin, Neurological) was performed and found to be negative except as described in this note.

## 2020-12-21 NOTE — LETTER
"12/21/2020       RE: Braulio Guardado  [address]  [place] [address]     Dear Colleague,    Thank you for referring your patient, Braulio Guardado, to the [facility] NEUROLOGY CLINIC [place] at [facility]. Please see a copy of my visit note below.    Braulio Guardado is a 65 year old male who is being evaluated via a billable video visit.      The patient has been notified of following:     \"This video visit will be conducted via a call between you and your physician/provider. We have found that certain health care needs can be provided without the need for an in-person physical exam.  This service lets us provide the care you need with a video conversation.  If a prescription is necessary we can send it directly to your pharmacy.  If lab work is needed we can place an order for that and you can then stop by our lab to have the test done at a later time.    Video visits are billed at different rates depending on your insurance coverage.  Please reach out to your insurance provider with any questions.    If during the course of the call the physician/provider feels a video visit is not appropriate, you will not be charged for this service.\"    Patient has given verbal consent for Video visit? Yes  How would you like to obtain your AVS? MyChart  If you are dropped from the video visit, the video invite should be resent to: send to phone number: [phone]  Will anyone else be joining your video visit? No      Video-Visit Details    Type of service:  Video Visit    Video time: 45 minutes    Originating Location (pt. Location): Home    Distant Location (provider location):  [facility] NEUROLOGY [facility]     Platform used for Video Visit: Jarvis Bolivar          [facility]    Braulio Guardado MRN# [id number]   Age: 65 year old YOB: 1955     Requesting physician: No ref. provider found  Yoko Rdz "            Assessment and Plan:   Assessment:  1. Ischemic strokes-likely due to uncontrolled hypertension  2. Left hand numbness ulnar neuropathy vs stroke sequela     Plan:  I reviewed the risk factors for stroke and goals of treatment with the patient  SBP goal < 135, DBP < 85 long term, ay take weeks to reach  LDL goal < 100 even if at goal should continue on statin. Recheck lipid profile in 6 months  Plavix and Aspirin for 90 days and then reduce down to ASA 81 mg daily only  Reviewed the importance of exercise    He will go to the ER if he has new acute symptoms suggestive of a new stroke    He will go for an EMG of the left upper extremity to make sure we aren't missing an ulnar neuropathy in addition to the strokes.     45 minutes with the patient over 50% counseling.  Sugar Gordillo MD           History of Present Illness:     chief complaint:   Chief Complaint   Patient presents with     Consult     VIDEO VISIT JOSHUA Guardado is a 65 year old right-handed gentleman who is scheduled for a visit to discuss left arm numbness. He was originally scheduled to see me and when he called to report worsening symptoms and was asking for an earlier appointment he was seen by Nabil Mejia and Ana 12/9/2020. At the time of the appointment he was noted to have hypertensive urgency blood pressure 228/145, pulse 107. EKG showed sinus tachycardia. He was sent on to the ER and admitted. He has had several return trips to the ED and hospital since then for uncontrolled BP. This morning he reports blood pressure is still high but getting better. He has systolics in the 170s. He also acknowledges that the stress and anxiety of having high blood pressure doesn't help and his numbers do get better when he is able to calm himself and relax.     While hospitalized he had an MRI brain which revealed subacute strokes in the right frontoparietal junction and occipital lobe. Also in the right centrum semiovale. In  addition chronic sequelae of microvascular ischemic disease was seen. The patient is on aspirin and Plavix for 90 days to then switch to aspirin. He has been started on lipid therapy with atorvastatin and multiple blood pressure medicines have been started. He meets with a new PCP tomorrow in [facility],     He continues to report left hand numbness. Mainly in the 4th and 5th digit. He also feels he has less strength in hte hand. Position seems to make it worse such as elevation and when he sleeps on his arm. He does have some neck pain as well.            Physical Exam:   Limited due to video. He took BP this morning and reports it was 178/112    General Appearance:  Well groomed and cooperative with examination, no acute distress  Neurological Examination:  Muscle bulk looks normal. He denies tinels at the wrist and phalens is negative. He does have some tenderness to palpation around the left medial elbow.               Data:   All laboratory data reviewed  All imaging studies reviewed by me             DATA for DOCUMENTATION:         Past Medical History:     Patient Active Problem List   Diagnosis     Left upper extremity numbness     Abnormal head CT     Hypertensive urgency     Anxiety     Essential hypertension     Benign essential hypertension     Exposure to SARS-associated coronavirus     Hypertension, uncontrolled     Past Medical History:   Diagnosis Date     Cerebral infarction (H)      Hyperlipidemia      Hypertension        Also see scanned health assessment forms.       Past Surgical History:   No past surgical history on file.         Social History:     Social History     Socioeconomic History     Marital status: Single     Spouse name: Not on file     Number of children: Not on file     Years of education: Not on file     Highest education level: Not on file   Occupational History     Not on file   Social Needs     Financial resource strain: Not on file     Food insecurity     Worry: Not on file      Inability: Not on file     Transportation needs     Medical: Not on file     Non-medical: Not on file   Tobacco Use     Smoking status: Never Smoker     Smokeless tobacco: Never Used   Substance and Sexual Activity     Alcohol use: Never     Frequency: Never     Drug use: Yes     Types: Marijuana     Sexual activity: Not on file   Lifestyle     Physical activity     Days per week: Not on file     Minutes per session: Not on file     Stress: Not on file   Relationships     Social connections     Talks on phone: Not on file     Gets together: Not on file     Attends Jewish service: Not on file     Active member of club or organization: Not on file     Attends meetings of clubs or organizations: Not on file     Relationship status: Not on file     Intimate partner violence     Fear of current or ex partner: Not on file     Emotionally abused: Not on file     Physically abused: Not on file     Forced sexual activity: Not on file   Other Topics Concern     Not on file   Social History Narrative     Not on file              Family History:   No family history on file.         Medications:     Current Outpatient Medications   Medication Sig     aspirin (ASA) 81 MG chewable tablet Take 1 tablet (81 mg) by mouth daily     atorvastatin (LIPITOR) 40 MG tablet Take 1 tablet (40 mg) by mouth every evening     clopidogrel (PLAVIX) 75 MG tablet Take 1 tablet (75 mg) by mouth daily     Cyanocobalamin (VITAMIN B 12 PO) Take 1 tablet by mouth daily     lisinopril (ZESTRIL) 20 MG tablet Take 1 tablet (20 mg) by mouth daily     carvedilol (COREG) 25 MG tablet Take 1 tablet (25 mg) by mouth 2 times daily (with meals)     clonazePAM (KLONOPIN) 0.5 MG tablet Take 1 tablet (0.5 mg) by mouth At Bedtime     LORazepam (ATIVAN) 0.5 MG tablet Take 0.5 mg by mouth every 8 hours as needed for anxiety     OLANZapine (ZYPREXA) 5 MG tablet Take 0.5-1 tablets (2.5-5 mg) by mouth nightly as needed (Anxiety)     No current facility-administered  medications for this visit.             Review of Systems:   A comprehensive 10 point review of systems (constitutional, ENT, cardiac, peripheral vascular, lymphatic, respiratory, GI, , Musculoskeletal, skin, Neurological) was performed and found to be negative except as described in this note.     Again, thank you for allowing me to participate in the care of your patient.      Sincerely,    Sugar Gordillo MD

## 2020-12-22 LAB
ALDOSTERONE RENIN RATIO: 17.7 (ref 0–25)
RENIN PLAS-CCNC: 0.9 NG/ML/HR

## 2020-12-24 ENCOUNTER — NURSE TRIAGE (OUTPATIENT)
Dept: NURSING | Facility: CLINIC | Age: 65
End: 2020-12-24

## 2020-12-24 ENCOUNTER — HOSPITAL ENCOUNTER (OUTPATIENT)
Facility: CLINIC | Age: 65
Setting detail: OBSERVATION
Discharge: HOME | End: 2020-12-25
Attending: INTERNAL MEDICINE | Admitting: INTERNAL MEDICINE
Payer: COMMERCIAL

## 2020-12-24 DIAGNOSIS — F41.1 GENERALIZED ANXIETY DISORDER: ICD-10-CM

## 2020-12-24 DIAGNOSIS — I10 UNCONTROLLED HYPERTENSION: ICD-10-CM

## 2020-12-24 DIAGNOSIS — I10 HYPERTENSION, UNCONTROLLED: Primary | ICD-10-CM

## 2020-12-24 DIAGNOSIS — Z20.828 EXPOSURE TO SARS-ASSOCIATED CORONAVIRUS: ICD-10-CM

## 2020-12-24 DIAGNOSIS — I16.0 HYPERTENSIVE URGENCY: ICD-10-CM

## 2020-12-24 LAB
ALBUMIN SERPL-MCNC: 3.1 G/DL (ref 3.4–5)
ALP SERPL-CCNC: 101 U/L (ref 40–150)
ALT SERPL W P-5'-P-CCNC: 25 U/L (ref 0–70)
ANION GAP SERPL CALCULATED.3IONS-SCNC: 4 MMOL/L (ref 3–14)
AST SERPL W P-5'-P-CCNC: 18 U/L (ref 0–45)
BASOPHILS # BLD AUTO: 0 10E9/L (ref 0–0.2)
BASOPHILS NFR BLD AUTO: 0.5 %
BILIRUB SERPL-MCNC: 0.3 MG/DL (ref 0.2–1.3)
BUN SERPL-MCNC: 15 MG/DL (ref 7–30)
CALCIUM SERPL-MCNC: 8.5 MG/DL (ref 8.5–10.1)
CHLORIDE SERPL-SCNC: 109 MMOL/L (ref 94–109)
CO2 SERPL-SCNC: 29 MMOL/L (ref 20–32)
CREAT SERPL-MCNC: 0.84 MG/DL (ref 0.66–1.25)
CRP SERPL-MCNC: 11 MG/L (ref 0–8)
DIFFERENTIAL METHOD BLD: NORMAL
EOSINOPHIL # BLD AUTO: 0.1 10E9/L (ref 0–0.7)
EOSINOPHIL NFR BLD AUTO: 1 %
ERYTHROCYTE [DISTWIDTH] IN BLOOD BY AUTOMATED COUNT: 11.8 % (ref 10–15)
GFR SERPL CREATININE-BSD FRML MDRD: >90 ML/MIN/{1.73_M2}
GLUCOSE SERPL-MCNC: 105 MG/DL (ref 70–99)
HCT VFR BLD AUTO: 43 % (ref 40–53)
HGB BLD-MCNC: 14.4 G/DL (ref 13.3–17.7)
IMM GRANULOCYTES # BLD: 0 10E9/L (ref 0–0.4)
IMM GRANULOCYTES NFR BLD: 0.3 %
INTERPRETATION ECG - MUSE: NORMAL
LYMPHOCYTES # BLD AUTO: 1.8 10E9/L (ref 0.8–5.3)
LYMPHOCYTES NFR BLD AUTO: 21.2 %
MCH RBC QN AUTO: 29.5 PG (ref 26.5–33)
MCHC RBC AUTO-ENTMCNC: 33.5 G/DL (ref 31.5–36.5)
MCV RBC AUTO: 88 FL (ref 78–100)
MONOCYTES # BLD AUTO: 0.8 10E9/L (ref 0–1.3)
MONOCYTES NFR BLD AUTO: 8.7 %
NEUTROPHILS # BLD AUTO: 5.9 10E9/L (ref 1.6–8.3)
NEUTROPHILS NFR BLD AUTO: 68.3 %
NRBC # BLD AUTO: 0 10*3/UL
NRBC BLD AUTO-RTO: 0 /100
PLATELET # BLD AUTO: 249 10E9/L (ref 150–450)
POTASSIUM SERPL-SCNC: 3.6 MMOL/L (ref 3.4–5.3)
PROT SERPL-MCNC: 6.8 G/DL (ref 6.8–8.8)
RBC # BLD AUTO: 4.88 10E12/L (ref 4.4–5.9)
SODIUM SERPL-SCNC: 142 MMOL/L (ref 133–144)
WBC # BLD AUTO: 8.7 10E9/L (ref 4–11)

## 2020-12-24 PROCEDURE — G0378 HOSPITAL OBSERVATION PER HR: HCPCS

## 2020-12-24 PROCEDURE — 96375 TX/PRO/DX INJ NEW DRUG ADDON: CPT | Performed by: INTERNAL MEDICINE

## 2020-12-24 PROCEDURE — 80053 COMPREHEN METABOLIC PANEL: CPT | Performed by: INTERNAL MEDICINE

## 2020-12-24 PROCEDURE — 93010 ELECTROCARDIOGRAM REPORT: CPT | Performed by: INTERNAL MEDICINE

## 2020-12-24 PROCEDURE — 93005 ELECTROCARDIOGRAM TRACING: CPT | Performed by: INTERNAL MEDICINE

## 2020-12-24 PROCEDURE — C9803 HOPD COVID-19 SPEC COLLECT: HCPCS | Performed by: INTERNAL MEDICINE

## 2020-12-24 PROCEDURE — U0003 INFECTIOUS AGENT DETECTION BY NUCLEIC ACID (DNA OR RNA); SEVERE ACUTE RESPIRATORY SYNDROME CORONAVIRUS 2 (SARS-COV-2) (CORONAVIRUS DISEASE [COVID-19]), AMPLIFIED PROBE TECHNIQUE, MAKING USE OF HIGH THROUGHPUT TECHNOLOGIES AS DESCRIBED BY CMS-2020-01-R: HCPCS | Performed by: INTERNAL MEDICINE

## 2020-12-24 PROCEDURE — 96374 THER/PROPH/DIAG INJ IV PUSH: CPT | Performed by: INTERNAL MEDICINE

## 2020-12-24 PROCEDURE — 250N000011 HC RX IP 250 OP 636: Performed by: INTERNAL MEDICINE

## 2020-12-24 PROCEDURE — 85025 COMPLETE CBC W/AUTO DIFF WBC: CPT | Performed by: INTERNAL MEDICINE

## 2020-12-24 PROCEDURE — 99284 EMERGENCY DEPT VISIT MOD MDM: CPT | Mod: 25 | Performed by: INTERNAL MEDICINE

## 2020-12-24 PROCEDURE — 99285 EMERGENCY DEPT VISIT HI MDM: CPT | Mod: 25 | Performed by: INTERNAL MEDICINE

## 2020-12-24 PROCEDURE — 86140 C-REACTIVE PROTEIN: CPT | Performed by: INTERNAL MEDICINE

## 2020-12-24 PROCEDURE — 250N000013 HC RX MED GY IP 250 OP 250 PS 637: Performed by: INTERNAL MEDICINE

## 2020-12-24 RX ORDER — LORAZEPAM 2 MG/ML
0.5 INJECTION INTRAMUSCULAR ONCE
Status: COMPLETED | OUTPATIENT
Start: 2020-12-24 | End: 2020-12-24

## 2020-12-24 RX ORDER — ONDANSETRON 2 MG/ML
4 INJECTION INTRAMUSCULAR; INTRAVENOUS EVERY 6 HOURS PRN
Status: DISCONTINUED | OUTPATIENT
Start: 2020-12-24 | End: 2020-12-25 | Stop reason: HOSPADM

## 2020-12-24 RX ORDER — ACETAMINOPHEN 650 MG/1
650 SUPPOSITORY RECTAL EVERY 4 HOURS PRN
Status: DISCONTINUED | OUTPATIENT
Start: 2020-12-24 | End: 2020-12-25 | Stop reason: HOSPADM

## 2020-12-24 RX ORDER — LABETALOL HYDROCHLORIDE 5 MG/ML
10 INJECTION, SOLUTION INTRAVENOUS ONCE
Status: COMPLETED | OUTPATIENT
Start: 2020-12-24 | End: 2020-12-24

## 2020-12-24 RX ORDER — ACETAMINOPHEN 325 MG/1
650 TABLET ORAL EVERY 4 HOURS PRN
Status: DISCONTINUED | OUTPATIENT
Start: 2020-12-24 | End: 2020-12-25 | Stop reason: HOSPADM

## 2020-12-24 RX ORDER — LORAZEPAM 0.5 MG/1
0.5 TABLET ORAL EVERY 8 HOURS PRN
Status: DISCONTINUED | OUTPATIENT
Start: 2020-12-24 | End: 2020-12-25 | Stop reason: HOSPADM

## 2020-12-24 RX ORDER — POLYETHYLENE GLYCOL 3350 17 G/17G
17 POWDER, FOR SOLUTION ORAL DAILY
Status: DISCONTINUED | OUTPATIENT
Start: 2020-12-25 | End: 2020-12-25 | Stop reason: HOSPADM

## 2020-12-24 RX ORDER — ASPIRIN 81 MG/1
81 TABLET, CHEWABLE ORAL DAILY
Status: DISCONTINUED | OUTPATIENT
Start: 2020-12-25 | End: 2020-12-25 | Stop reason: HOSPADM

## 2020-12-24 RX ORDER — HYDROXYZINE HYDROCHLORIDE 25 MG/1
25 TABLET, FILM COATED ORAL EVERY 6 HOURS PRN
Status: DISCONTINUED | OUTPATIENT
Start: 2020-12-24 | End: 2020-12-25 | Stop reason: HOSPADM

## 2020-12-24 RX ORDER — CLOPIDOGREL BISULFATE 75 MG/1
75 TABLET ORAL DAILY
Status: DISCONTINUED | OUTPATIENT
Start: 2020-12-25 | End: 2020-12-25 | Stop reason: HOSPADM

## 2020-12-24 RX ORDER — ATORVASTATIN CALCIUM 40 MG/1
40 TABLET, FILM COATED ORAL EVERY EVENING
Status: DISCONTINUED | OUTPATIENT
Start: 2020-12-24 | End: 2020-12-25 | Stop reason: HOSPADM

## 2020-12-24 RX ORDER — LORAZEPAM 0.5 MG/1
0.5 TABLET ORAL EVERY 8 HOURS PRN
COMMUNITY
End: 2021-03-09

## 2020-12-24 RX ORDER — ONDANSETRON 4 MG/1
4 TABLET, ORALLY DISINTEGRATING ORAL EVERY 6 HOURS PRN
Status: DISCONTINUED | OUTPATIENT
Start: 2020-12-24 | End: 2020-12-25 | Stop reason: HOSPADM

## 2020-12-24 RX ORDER — LISINOPRIL 20 MG/1
20 TABLET ORAL DAILY
Status: DISCONTINUED | OUTPATIENT
Start: 2020-12-25 | End: 2020-12-25 | Stop reason: HOSPADM

## 2020-12-24 RX ORDER — OLANZAPINE 5 MG/1
5 TABLET, ORALLY DISINTEGRATING ORAL ONCE
Status: COMPLETED | OUTPATIENT
Start: 2020-12-24 | End: 2020-12-24

## 2020-12-24 RX ORDER — AMOXICILLIN 250 MG
2 CAPSULE ORAL 2 TIMES DAILY
Status: DISCONTINUED | OUTPATIENT
Start: 2020-12-24 | End: 2020-12-25 | Stop reason: HOSPADM

## 2020-12-24 RX ORDER — AMOXICILLIN 250 MG
1 CAPSULE ORAL 2 TIMES DAILY
Status: DISCONTINUED | OUTPATIENT
Start: 2020-12-24 | End: 2020-12-25 | Stop reason: HOSPADM

## 2020-12-24 RX ADMIN — LABETALOL HYDROCHLORIDE 10 MG: 5 INJECTION, SOLUTION INTRAVENOUS at 21:06

## 2020-12-24 RX ADMIN — LORAZEPAM 0.5 MG: 2 INJECTION INTRAMUSCULAR; INTRAVENOUS at 20:39

## 2020-12-24 RX ADMIN — OLANZAPINE 5 MG: 5 TABLET, ORALLY DISINTEGRATING ORAL at 18:34

## 2020-12-24 ASSESSMENT — ENCOUNTER SYMPTOMS
CONFUSION: 0
SPEECH DIFFICULTY: 0
PALPITATIONS: 0
HEADACHES: 0
FEVER: 0
TROUBLE SWALLOWING: 0
ABDOMINAL PAIN: 0
COUGH: 0
CHILLS: 0
WHEEZING: 0
LIGHT-HEADEDNESS: 0
NERVOUS/ANXIOUS: 1
NAUSEA: 0
NUMBNESS: 0
ADENOPATHY: 0
WEAKNESS: 0
BACK PAIN: 0
DIFFICULTY URINATING: 0
NECK PAIN: 0
SHORTNESS OF BREATH: 0
VOMITING: 0

## 2020-12-24 ASSESSMENT — MIFFLIN-ST. JEOR: SCORE: 1731.12

## 2020-12-24 NOTE — TELEPHONE ENCOUNTER
Calling again with his high blood pressure.  Called earlier, and did make an appointment with his  PCP with [facility] on Tuesday.    But now his BP is 222/131, pulse 82.  Very anxious and took Lorazepam 1 hour ago and it did not help.  He is worried about prior stroke, and wants to be seen in ER.    Janice Randle RN  [facility]      Additional Information    Negative: Difficult to awaken or acting confused (e.g., disoriented, slurred speech)    Negative: Severe difficulty breathing (e.g., struggling for each breath, speaks in single words)    Negative: [1] Weakness of the face, arm or leg on one side of the body AND [2] new onset    Negative: [1] Numbness (i.e., loss of sensation) of the face, arm or leg on one side of the body AND [2] new onset    Negative: [1] Chest pain lasts > 5 minutes AND [2] history of heart disease  (i.e., heart attack, bypass surgery, angina, angioplasty, CHF)    Negative: [1] Chest pain AND [2] took nitrogylcerin AND [3] pain was not relieved    Negative: Sounds like a life-threatening emergency to the triager    Negative: [1] Systolic BP  >= 160 OR Diastolic >= 100 AND [2] cardiac or neurologic symptoms (e.g., chest pain, difficulty breathing, unsteady gait, blurred vision)    Negative: [1] Pregnant > 20 weeks (or postpartum < 6 weeks) AND [2] new hand or face swelling    Negative: [1] Pregnant > 20 weeks AND [2] BP Systolic BP  >= 140 OR Diastolic >= 90    [1] Systolic BP  >= 200 OR Diastolic >= 120  AND [2] having NO cardiac or neurologic symptoms    Protocols used: HIGH BLOOD PRESSURE-A-AH

## 2020-12-24 NOTE — ED PROVIDER NOTES
[place] EMERGENCY DEPARTMENT ([facility])  12/24/20  History     Chief Complaint   Patient presents with     Hypertension     The history is provided by the patient and medical records.     Braulio Guardado is a 65 year old male with medical history signficant for HTN, HLD, cerebral infarction, and anxiety.  Patient was recently admitted at [facility] ED from 12/15 to 12/17/2020 for evaluation of significant blood pressure elevation, anxiety, and PTSD.  Patient presented to the ED with a BP of 192/113, he was given Hydroxyzine and his BP decreased to 155/126. Patient's renal ultrasound was negative for renal artery stenosis; TSH and free cortisol were within normal limits. Metanephines and aldosterone normal.  Patient was started on Coreg (3.125 mg p.o), lisinopril increased to 10 mg p.o daily, and started on Klonopin.  Patient was discharged with instruction to follow up with nephrology.  Patient was seen at [facility] at 12/22/2020 with hypertensive urgency. He saw his primary physician in clinic 2 days ago. Blood pressure was still 180/100. His Coreg was increased to 12.5 mg BID. This morning, his SBP was 200 at home and remained in the 200 range this afternoon. He contacted the nurse call line and was advised to come in. He shoveled snow last night without difficulty. He has no headache, chest pain, shortness of breath, nausea, vomiting, abdominal pain. He has some numbness in his left hand and in his left toes which is ongoing. He does feel his anxiety is worse since this morning. Ativan this afternoon did not help.        I have reviewed the Medications, Allergies, Past Medical and Surgical History, and Social History in the [employer] system.    PAST MEDICAL HISTORY:   Past Medical History:   Diagnosis Date     Cerebral infarction (H)      Hyperlipidemia      Hypertension        PAST SURGICAL HISTORY: History reviewed. No pertinent surgical history.    Past medical history, past surgical history,  medications, and allergies were reviewed with the patient. Additional pertinent items: None    FAMILY HISTORY: History reviewed. No pertinent family history.    SOCIAL HISTORY:   Social History     Tobacco Use     Smoking status: Never Smoker     Smokeless tobacco: Never Used   Substance Use Topics     Alcohol use: Never     Frequency: Never     Social history was reviewed with the patient. Additional pertinent items: None      Patient's Medications   New Prescriptions    No medications on file   Previous Medications    ASPIRIN (ASA) 81 MG CHEWABLE TABLET    Take 1 tablet (81 mg) by mouth daily    ATORVASTATIN (LIPITOR) 40 MG TABLET    Take 1 tablet (40 mg) by mouth every evening    CARVEDILOL (COREG) 3.125 MG TABLET    Take 1 tablet (3.125 mg) by mouth 2 times daily (with meals)    CLOPIDOGREL (PLAVIX) 75 MG TABLET    Take 1 tablet (75 mg) by mouth daily    CYANOCOBALAMIN (VITAMIN B 12 PO)    Take 1 tablet by mouth daily    HYDROXYZINE (ATARAX) 25 MG TABLET    Take 1 tablet (25 mg) by mouth every 6 hours as needed for other (adjuvant pain)    LISINOPRIL (ZESTRIL) 20 MG TABLET    Take 1 tablet (20 mg) by mouth daily    LORAZEPAM (ATIVAN) 0.5 MG TABLET    Take 0.5 mg by mouth every 8 hours as needed for anxiety   Modified Medications    No medications on file   Discontinued Medications    No medications on file        No Known Allergies     Review of Systems   Constitutional: Negative for chills and fever.   HENT: Negative for congestion and trouble swallowing.    Eyes: Negative for visual disturbance.   Respiratory: Negative for cough, shortness of breath and wheezing.    Cardiovascular: Negative for chest pain and palpitations.   Gastrointestinal: Negative for abdominal pain, nausea and vomiting.   Genitourinary: Negative for difficulty urinating.   Musculoskeletal: Negative for back pain and neck pain.   Skin: Negative for rash.   Neurological: Negative for speech difficulty, weakness, light-headedness, numbness  "and headaches.   Hematological: Negative for adenopathy.   Psychiatric/Behavioral: Negative for confusion. The patient is nervous/anxious.          Physical Exam   BP: (!) 180/109  Pulse: 89  Temp: 97.6  F (36.4  C)  Resp: 15  Height: 177.8 cm (5' 10\")  Weight: 93 kg (205 lb)  SpO2: 97 %      Physical Exam  Vitals signs and nursing note reviewed.   Constitutional:       General: He is not in acute distress.     Appearance: Normal appearance.   HENT:      Head: Normocephalic and atraumatic.      Right Ear: External ear normal.      Left Ear: External ear normal.      Nose: Nose normal.      Mouth/Throat:      Mouth: Mucous membranes are moist.   Eyes:      Extraocular Movements: Extraocular movements intact.      Pupils: Pupils are equal, round, and reactive to light.   Neck:      Musculoskeletal: Normal range of motion.   Cardiovascular:      Rate and Rhythm: Normal rate and regular rhythm.      Heart sounds: No murmur.   Pulmonary:      Effort: Pulmonary effort is normal.      Breath sounds: Normal breath sounds. No wheezing or rales.   Abdominal:      General: Abdomen is flat.      Palpations: Abdomen is soft.      Tenderness: There is no abdominal tenderness. There is no guarding.   Musculoskeletal:      Right lower leg: No edema.      Left lower leg: No edema.   Skin:     General: Skin is warm and dry.   Neurological:      General: No focal deficit present.      Mental Status: He is alert and oriented to person, place, and time.      Cranial Nerves: No cranial nerve deficit.   Psychiatric:         Mood and Affect: Mood normal.         Behavior: Behavior normal.         ED Course        Procedures             EKG Interpretation:      Interpreted by JS DUKES MD  Time reviewed: 1823  Symptoms at time of EKG: elevated blood pressure   Rhythm: normal sinus   Rate: Normal  Axis: Normal  Ectopy: none  Conduction: normal  ST Segments/ T Waves: No ST-T wave changes and No acute ischemic changes  Q Waves: " none  Comparison to prior: Unchanged    Clinical Impression: normal EKG        Results for orders placed or performed during the hospital encounter of 12/24/20 (from the past 24 hour(s))   EKG 12-lead, tracing only   Result Value Ref Range    Interpretation ECG Click View Image link to view waveform and result    CBC with platelets differential   Result Value Ref Range    WBC 8.7 4.0 - 11.0 10e9/L    RBC Count 4.88 4.4 - 5.9 10e12/L    Hemoglobin 14.4 13.3 - 17.7 g/dL    Hematocrit 43.0 40.0 - 53.0 %    MCV 88 78 - 100 fl    MCH 29.5 26.5 - 33.0 pg    MCHC 33.5 31.5 - 36.5 g/dL    RDW 11.8 10.0 - 15.0 %    Platelet Count 249 150 - 450 10e9/L    Diff Method Automated Method     % Neutrophils 68.3 %    % Lymphocytes 21.2 %    % Monocytes 8.7 %    % Eosinophils 1.0 %    % Basophils 0.5 %    % Immature Granulocytes 0.3 %    Nucleated RBCs 0 0 /100    Absolute Neutrophil 5.9 1.6 - 8.3 10e9/L    Absolute Lymphocytes 1.8 0.8 - 5.3 10e9/L    Absolute Monocytes 0.8 0.0 - 1.3 10e9/L    Absolute Eosinophils 0.1 0.0 - 0.7 10e9/L    Absolute Basophils 0.0 0.0 - 0.2 10e9/L    Abs Immature Granulocytes 0.0 0 - 0.4 10e9/L    Absolute Nucleated RBC 0.0    Comprehensive metabolic panel   Result Value Ref Range    Sodium 142 133 - 144 mmol/L    Potassium 3.6 3.4 - 5.3 mmol/L    Chloride 109 94 - 109 mmol/L    Carbon Dioxide 29 20 - 32 mmol/L    Anion Gap 4 3 - 14 mmol/L    Glucose 105 (H) 70 - 99 mg/dL    Urea Nitrogen 15 7 - 30 mg/dL    Creatinine 0.84 0.66 - 1.25 mg/dL    GFR Estimate >90 >60 mL/min/[1.73_m2]    GFR Estimate If Black >90 >60 mL/min/[1.73_m2]    Calcium 8.5 8.5 - 10.1 mg/dL    Bilirubin Total 0.3 0.2 - 1.3 mg/dL    Albumin 3.1 (L) 3.4 - 5.0 g/dL    Protein Total 6.8 6.8 - 8.8 g/dL    Alkaline Phosphatase 101 40 - 150 U/L    ALT 25 0 - 70 U/L    AST 18 0 - 45 U/L   CRP inflammation   Result Value Ref Range    CRP Inflammation 11.0 (H) 0.0 - 8.0 mg/L     Medications   OLANZapine zydis (zyPREXA) ODT tab 5 mg (5 mg Oral  Given 12/24/20 1834)   LORazepam (ATIVAN) injection 0.5 mg (0.5 mg Intravenous Given 12/24/20 2039)   labetalol (NORMODYNE/TRANDATE) injection 10 mg (10 mg Intravenous Given 12/24/20 2106)             Assessments & Plan (with Medical Decision Making)   Impression:  Middle aged male presents with uncontrolled HTN. Blood pressure management has been complicated by his underlying anxiety disorder. He has a long history of anxiety and PTSD. He was admitted to the hospital for uncontrolled hypertension and had comprehensive workup for secondary HTN, renovascular HTN and endocrine HTN. He has had multiple changes in blood pressure medication. Norvasc was stopped as felt to be minimally effective. He was discharged from the hospital on lisinopril and low dose Coreg. @ days ago he visited his primary MD with blood pressure again elevated to the 180-200 range. Coreg was increased to 12.5 mg. Today he continues to have blood pressure in the 200/110 range. He was prescribed Klonopin and then ativan for anxiety, which he took this afternoon without improvement.  He had some improvement of blood pressure following zyprexa and ativan, but did need additional IV labetalol to obtain adequate blood pressure control. At this point, I would like to place him on Seroquel 12.5 mg BID for chronic anxiety and increase the Coreg to 25 mg BID. If he continues to be hypertensive, addition of low dose hydrochlorothiazide (12.5 mg) or addition of Norvasc again could be considered. Anxiety does appear to be contributing to his HTN, but I do not think he has had adequate medical control of the HTN at this point. I discussed admission to ED observation again tonight, and he is agreeable to this.    I have reviewed the nursing notes.    I have reviewed the findings, diagnosis, plan and need for follow up with the patient.    New Prescriptions    No medications on file       Final diagnoses:   Uncontrolled hypertension   Generalized anxiety  disorder       12/24/2020   [facility] EMERGENCY DEPARTMENT     Rosalino Contreras MD  12/24/20 [id number]

## 2020-12-24 NOTE — ED TRIAGE NOTES
Presents with hypertension since this morning. Patient took his BP around 16:30 and found it to be 222/131. Per triage nurse to come into ER for further evaluation.

## 2020-12-24 NOTE — TELEPHONE ENCOUNTER
Pt reporting he has been having issues with his blood pressure for the last few months.  Today the Pt went outside to shovel some snow.   Started feeling his heart pounding and racing.    Pt went back into the house to take his blood pressure and it was 200/115.      No other symptoms.    Pt is feeling fine now.   But refused to take his blood pressure while we were on the phone.     I suggested he call his Primary Care Provider at [facility] and make an office visit for Pt care.    Pt refused to go back to the ER for his high blood pressure.       Pt will make an office visit with his Primary Care Provider after we get off the phone.       No further action needed.    Rin Lock RN  Central Triage Red Flags/Med Refills            Additional Information    Negative: Sounds like a life-threatening emergency to the triager    Negative: Pregnant > 20 weeks or postpartum (< 6 weeks after delivery) and new hand or face swelling    Negative: Pregnant > 20 weeks and BP > 140/90    Negative: Systolic BP >= 160 OR Diastolic >= 100, and any cardiac or neurologic symptoms (e.g., chest pain, difficulty breathing, unsteady gait, blurred vision)    Negative: Patient sounds very sick or weak to the triager    Negative: BP Systolic BP >= 140 OR Diastolic >= 90 and postpartum (from 0 to 6 weeks after delivery)    Negative: Systolic BP >= 180 OR Diastolic >= 110, and missed most recent dose of blood pressure medication    Systolic BP >= 180 OR Diastolic >= 110    Protocols used: HIGH BLOOD PRESSURE-A-OH

## 2020-12-25 VITALS
DIASTOLIC BLOOD PRESSURE: 100 MMHG | SYSTOLIC BLOOD PRESSURE: 166 MMHG | OXYGEN SATURATION: 96 % | HEART RATE: 63 BPM | TEMPERATURE: 98.6 F | BODY MASS INDEX: 28.7 KG/M2 | HEIGHT: 71 IN | RESPIRATION RATE: 16 BRPM | WEIGHT: 205 LBS

## 2020-12-25 LAB
LABORATORY COMMENT REPORT: NORMAL
SARS-COV-2 RNA SPEC QL NAA+PROBE: NEGATIVE
SPECIMEN SOURCE: NORMAL

## 2020-12-25 PROCEDURE — G0378 HOSPITAL OBSERVATION PER HR: HCPCS

## 2020-12-25 PROCEDURE — 250N000013 HC RX MED GY IP 250 OP 250 PS 637: Performed by: PHYSICIAN ASSISTANT

## 2020-12-25 PROCEDURE — 250N000013 HC RX MED GY IP 250 OP 250 PS 637: Performed by: NURSE PRACTITIONER

## 2020-12-25 RX ORDER — CARVEDILOL 6.25 MG/1
12.5 TABLET ORAL 2 TIMES DAILY WITH MEALS
Status: DISCONTINUED | OUTPATIENT
Start: 2020-12-25 | End: 2020-12-25

## 2020-12-25 RX ORDER — CARVEDILOL 6.25 MG/1
12.5 TABLET ORAL ONCE
Status: COMPLETED | OUTPATIENT
Start: 2020-12-25 | End: 2020-12-25

## 2020-12-25 RX ORDER — CARVEDILOL 25 MG/1
25 TABLET ORAL 2 TIMES DAILY WITH MEALS
Status: DISCONTINUED | OUTPATIENT
Start: 2020-12-25 | End: 2020-12-25 | Stop reason: HOSPADM

## 2020-12-25 RX ORDER — OLANZAPINE 5 MG/1
5 TABLET, ORALLY DISINTEGRATING ORAL ONCE
Status: COMPLETED | OUTPATIENT
Start: 2020-12-25 | End: 2020-12-25

## 2020-12-25 RX ORDER — OLANZAPINE 5 MG/1
2.5-5 TABLET ORAL
Qty: 30 TABLET | Refills: 0 | Status: SHIPPED | OUTPATIENT
Start: 2020-12-25 | End: 2021-01-24

## 2020-12-25 RX ORDER — CLONAZEPAM 0.5 MG/1
0.5 TABLET ORAL AT BEDTIME
Qty: 30 TABLET | Refills: 1 | Status: SHIPPED | OUTPATIENT
Start: 2020-12-25 | End: 2022-09-19

## 2020-12-25 RX ORDER — CARVEDILOL 25 MG/1
25 TABLET ORAL 2 TIMES DAILY WITH MEALS
Qty: 60 TABLET | Refills: 1 | Status: SHIPPED | OUTPATIENT
Start: 2020-12-25 | End: 2021-03-09

## 2020-12-25 RX ADMIN — CARVEDILOL 12.5 MG: 6.25 TABLET, FILM COATED ORAL at 06:08

## 2020-12-25 RX ADMIN — ASPIRIN 81 MG CHEWABLE TABLET 81 MG: 81 TABLET CHEWABLE at 08:04

## 2020-12-25 RX ADMIN — LISINOPRIL 20 MG: 20 TABLET ORAL at 08:05

## 2020-12-25 RX ADMIN — OLANZAPINE 5 MG: 5 TABLET, ORALLY DISINTEGRATING ORAL at 14:32

## 2020-12-25 RX ADMIN — CARVEDILOL 12.5 MG: 6.25 TABLET, FILM COATED ORAL at 10:21

## 2020-12-25 RX ADMIN — CLOPIDOGREL BISULFATE 75 MG: 75 TABLET ORAL at 08:05

## 2020-12-25 RX ADMIN — QUETIAPINE FUMARATE 12.5 MG: 25 TABLET, FILM COATED ORAL at 08:04

## 2020-12-25 NOTE — PROGRESS NOTES
"Patient interviewed and examine. Labs, imaging and chart notes reviewed.  Braulio Guardado has a history of recently diagnosed severe hypertension and anxiety disorder/PTSD. He had an extensive workup for renovascular, endocrine and other secondary causes of hypertension earlier this month. His blood pressure has in part been sensitive to his state of anxiety. It has remained elevated despite trial of lisinopril, norvasc and now lisinopril plus Coreg. He presented to the ED yesterday with elevated home BP readings in the 180-200/110 range. In the ED his blood pressure improved somewhat with zyprexa plus ativan for anxiety, but he did need additional IV labetalol to attain adequate control. His primary MD had increased the dose of Coreg from 3.125 mg BID to 12.5 mg a couple of days earlier. On ED observation, his blood pressure has improved overnight.     He doesn't like the way seroquel makes him feel. He states it makes him feel depressed. He states zyprexa seemed better.    Past Medical History:   Diagnosis Date     Cerebral infarction (H)      Hyperlipidemia      Hypertension        History reviewed. No pertinent surgical history.    History reviewed. No pertinent family history.    Social History     Tobacco Use     Smoking status: Never Smoker     Smokeless tobacco: Never Used   Substance Use Topics     Alcohol use: Never     Frequency: Never     Physical Exam:  Middle aged male in NAD.  BP (!) 150/95 (BP Location: Left arm)   Pulse 60   Temp 98  F (36.7  C) (Oral)   Resp 18   Ht 1.803 m (5' 11\")   Wt 93 kg (205 lb)   SpO2 97%   BMI 28.59 kg/m    HEENT: PERRLA. EOMI.  Neck: No mass or bruit.   Lungs: Clear.  Cardiac: RRR. Normal S1 and S2.  Abdomen: Soft.  Extrem: No edema.  Psych: Mormal mood and affect.    Labs/Imaging    Results for orders placed or performed during the hospital encounter of 12/24/20 (from the past 24 hour(s))   EKG 12-lead, tracing only   Result Value Ref Range    Interpretation ECG " Click View Image link to view waveform and result    CBC with platelets differential   Result Value Ref Range    WBC 8.7 4.0 - 11.0 10e9/L    RBC Count 4.88 4.4 - 5.9 10e12/L    Hemoglobin 14.4 13.3 - 17.7 g/dL    Hematocrit 43.0 40.0 - 53.0 %    MCV 88 78 - 100 fl    MCH 29.5 26.5 - 33.0 pg    MCHC 33.5 31.5 - 36.5 g/dL    RDW 11.8 10.0 - 15.0 %    Platelet Count 249 150 - 450 10e9/L    Diff Method Automated Method     % Neutrophils 68.3 %    % Lymphocytes 21.2 %    % Monocytes 8.7 %    % Eosinophils 1.0 %    % Basophils 0.5 %    % Immature Granulocytes 0.3 %    Nucleated RBCs 0 0 /100    Absolute Neutrophil 5.9 1.6 - 8.3 10e9/L    Absolute Lymphocytes 1.8 0.8 - 5.3 10e9/L    Absolute Monocytes 0.8 0.0 - 1.3 10e9/L    Absolute Eosinophils 0.1 0.0 - 0.7 10e9/L    Absolute Basophils 0.0 0.0 - 0.2 10e9/L    Abs Immature Granulocytes 0.0 0 - 0.4 10e9/L    Absolute Nucleated RBC 0.0    Comprehensive metabolic panel   Result Value Ref Range    Sodium 142 133 - 144 mmol/L    Potassium 3.6 3.4 - 5.3 mmol/L    Chloride 109 94 - 109 mmol/L    Carbon Dioxide 29 20 - 32 mmol/L    Anion Gap 4 3 - 14 mmol/L    Glucose 105 (H) 70 - 99 mg/dL    Urea Nitrogen 15 7 - 30 mg/dL    Creatinine 0.84 0.66 - 1.25 mg/dL    GFR Estimate >90 >60 mL/min/[1.73_m2]    GFR Estimate If Black >90 >60 mL/min/[1.73_m2]    Calcium 8.5 8.5 - 10.1 mg/dL    Bilirubin Total 0.3 0.2 - 1.3 mg/dL    Albumin 3.1 (L) 3.4 - 5.0 g/dL    Protein Total 6.8 6.8 - 8.8 g/dL    Alkaline Phosphatase 101 40 - 150 U/L    ALT 25 0 - 70 U/L    AST 18 0 - 45 U/L   CRP inflammation   Result Value Ref Range    CRP Inflammation 11.0 (H) 0.0 - 8.0 mg/L   Asymptomatic SARS-CoV-2 COVID-19 Virus (Coronavirus) by PCR    Specimen: Nasopharyngeal   Result Value Ref Range    SARS-CoV-2 Virus Specimen Source Nasopharyngeal     SARS-CoV-2 PCR Result NEGATIVE     SARS-CoV-2 PCR Comment       Testing was performed using the Xpert Xpress SARS-CoV-2 Assay on the Cepheid Gene-Xpert    Instrument Systems. Additional information about this Emergency Use Authorization (EUA)   assay can be found via the Lab Guide.         Impression:  Middle aged male with chronic anxiety disorder and newly diagnosed hypertension, with evidence of chronic small vessel ischemic disease on MRI of the head. His blood pressure seems improved with increased dose of the beta blocker. His anxiety seemed to respond better to zyprexa than anything else. He was on Klonopin in the past, but was told to use it PRN (which was not ideal for an agent with slow onset and long half-life.    His home blood pressure monitor is not accurate on comparison to hospital monitors.    He is working on setting up outpatient mental health services.    Plan:  Anticipate discharge this afternoon.  1. Continue Lisinopril 20 mg daily.  2. Increase Coreg to 25 mg BID.  3. Start Klonopin 1 mg at bedtime.  4. Stop seroquel.  5. Start zyprexa oral dissolving 2.5-5 mg BID PRN severe anxiety.  6. New home blood pressure monitor. ([employer] brand have high accuracy ratings)  7. Home health nursing for blood pressure monitoring.  8. Patient to arrange outpatient MH appointment with provider in his network.  9. Follow up with primary MD.    Rosalino Contreras MD

## 2020-12-25 NOTE — H&P
[facility]     History and Physical - ED Observation Service       Date of Admission:  12/24/2020    Assessment & Plan   Braulio Guardado is a 65 year old male admitted on 12/24/2020. He has medical history signficant for HTN, HLD, cerebral infarction, and anxiety.     ##. Hypertensive Urgency: Patient was discharged on 12/17/20 on Coreg 3.125 mg po bid and lisinopril increased to 20 mg p.o daily, and Klonopin prn. He was seen at [facility] 12/22/2020 with hypertensive urgency /100. At that visit reported home BPs 170's-180's/100's. Coreg increased to 12.5 mg BID. This AM SBP was 220's/130's and remained in 200 range all afternoon. Ativan and hydroxyzine this afternoon did not help. He shoveled snow last night without difficulty. Advised to come in by nurse. Denies headache, chest pain, shortness of breath, nausea, vomiting, abdominal pain. He has some numbness in his left hand and in his left toes which is ongoing and not new. In ED, HR 60's-80's, 's-180's/90's-130's, RR 15-25, SaO2 92-97% on RA, Temp 97.6. Labs show normal CMP and CBC. CRP 11. EKG unremarkable. Covid19 PCR negative. In the ED the patient was given olanzapine 5mg po x 1, ativan 0.5mg IV x 1, labetalol 10mg IV x 1. He is being admitted for better BP control. Just prior to transfer to ED Obs BP is 129/80. Anxiety does appear to be contributing to his HTN, though BP is still not well controlled.   - Continue Coreg 12.5mg BID. Increase Coreg to 25 mg BID if BP is still elevated  - Consider addition of low dose hydrochlorothiazide (12.5 mg) or of Norvasc again   - Monitor BP q4h    ##. Anxiety  ##. PTSD  - Continue Ativan and Hydroxyzine prn  - Consider prn doses of Zyprexa as this seemed to have worked in ED  - Start Seroquel 12.5 mg BID for chronic anxiety     ##. Multiple subacute-chronic CVAs  Multiple areas of infarct of various ages noted during 12/9/2020 admission. At that  "time, \"Neurologic exam largely normal other than some mild numbness and tingling in the left fingers, and per Neurology this may be related to focal ulnar nerve issues\" per documentation.   -- continue plavix.     ##. Left-sided 4th, 5th Finger, Foot Numbness: Per patient still present and persistent. Per previous Neurology note, not consistent with stroke syndromes related to lesion seen on head imaging.  More likely to be peripheral neuropathy.    - F/u Neurology clinic visit     Diet: Regular Diet Adult    DVT Prophylaxis: on plavix  [place] Catheter: not present  Code Status: Full Code           Disposition Plan   Expected discharge: Tomorrow, recommended to prior living arrangement once blood pressure less than 150/90 consistently.  Entered: THUY Larson 12/24/2020, 11:24 PM     The patient's care was discussed with the Attending Physician, Dr. Contreras.    THUY Larson  [facility]   Contact information available via [facility] Paging/Directory      ______________________________________________________________________    Chief Complaint   Hypertension     History is obtained from the patient    History of Present Illness   Braulio Guardado is a 65 year old male with medical history signficant for HTN, HLD, cerebral infarction, and anxiety.  Patient was recently admitted to [facility] from 12/15 - 12/17/2020 for evaluation of significant blood pressure elevation, anxiety, and PTSD. At that time patient presented to the ED with a BP of 192/113, he was given hydroxyzine and his BP decreased to 155/126. He was worked up for secondary hypertension including evaluation for pheochromocytoma, TSH, cortisol and renal ultrasound all of which were unrevealing. Nephrology Service was consulted during admission and made recommendations regarding BP control. Patient was started on Coreg 3.125 mg po, lisinopril increased to 20 mg p.o daily, and " "Klonopin prn.  For his anxiety/PTSD he was started on Lexapro at his previous admission to Observation Unit on 12/11/20 when Psychiatry was consulted but discontinued after 2 doses due to medication side effects (suicidality). He was also previously on Prozac and experienced similar side effects. However during his last admission he was seen by psychiatry, who recommended short-term use of Klonopin versus lorazepam. Multiple areas of infarct of various ages noted during 12/9/2020 admission. At that time, \"Neurologic exam largely normal other than some mild numbness and tingling in the left fingers, and per Neurology this may be related to focal ulnar nerve issues\" per documentation. He was continued on his plavix throughout admission.     Patient was seen at [facility] on 12/22/2020 with hypertensive urgency with /100. At that visit he reported home BPs 170's-180's/100's. His Coreg was increased to 12.5 mg BID. This morning, his SBP was 220's/130's at home and remained in the 200 range all afternoon. He contacted the nurse call line and was advised to come in. He does feel his anxiety is worse since this morning. Ativan and hydroxyzine this afternoon did not help. He shoveled snow last night without difficulty. Denies headache, chest pain, shortness of breath, nausea, vomiting, abdominal pain. He has some numbness in his left hand and in his left toes which is ongoing and not new.    In the ED, HR 60's-80's, 's-180's/90's-130's, RR 15-25, SaO2 92-97% on RA, Temp 97.6. Labs show normal CMP and CBC. CRP 11. EKG unremarkable. Covid19 PCR negative. In the ED the patient was given olanzapine 5mg po x 1, ativan 0.5mg IV x 1, labetalol 10mg IV x 1. He is being admitted for better BP control.       Review of Systems    All other ROS negative except those mentioned in above note.      Past Medical History    I have reviewed this patient's medical history and updated it with pertinent information " if needed.   Past Medical History:   Diagnosis Date     Cerebral infarction (H)      Hyperlipidemia      Hypertension        Past Surgical History   I have reviewed this patient's surgical history and updated it with pertinent information if needed.  History reviewed. No pertinent surgical history.    Social History   I have reviewed this patient's social history and updated it with pertinent information if needed.  Social History     Tobacco Use     Smoking status: Never Smoker     Smokeless tobacco: Never Used   Substance Use Topics     Alcohol use: Never     Frequency: Never     Drug use: Yes     Types: Marijuana       Family History       Prior to Admission Medications   Prior to Admission Medications   Prescriptions Last Dose Informant Patient Reported? Taking?   Cyanocobalamin (VITAMIN B 12 PO)   Yes No   Sig: Take 1 tablet by mouth daily   LORazepam (ATIVAN) 0.5 MG tablet 12/24/2020 at 1530  Yes Yes   Sig: Take 0.5 mg by mouth every 8 hours as needed for anxiety   aspirin (ASA) 81 MG chewable tablet 12/24/2020 at Unknown time  No Yes   Sig: Take 1 tablet (81 mg) by mouth daily   atorvastatin (LIPITOR) 40 MG tablet 12/23/2020 at Unknown time  No Yes   Sig: Take 1 tablet (40 mg) by mouth every evening   carvedilol (COREG) 3.125 MG tablet 12/24/2020 at Unknown time  No Yes   Sig: Take 1 tablet (3.125 mg) by mouth 2 times daily (with meals)   Patient taking differently: Take 12.5 mg by mouth 2 times daily (with meals)    clopidogrel (PLAVIX) 75 MG tablet 12/24/2020 at Unknown time  No Yes   Sig: Take 1 tablet (75 mg) by mouth daily   hydrOXYzine (ATARAX) 25 MG tablet 12/24/2020  No Yes   Sig: Take 1 tablet (25 mg) by mouth every 6 hours as needed for other (adjuvant pain)   lisinopril (ZESTRIL) 20 MG tablet 12/24/2020 at Unknown time  No Yes   Sig: Take 1 tablet (20 mg) by mouth daily      Facility-Administered Medications: None     Allergies   No Known Allergies    Physical Exam   Vital Signs: Temp: 97.6  F  (36.4  C) Temp src: Oral BP: 129/80 Pulse: 62   Resp: 23 SpO2: 95 % O2 Device: None (Room air)    Weight: 205 lbs 0 oz    Constitutional: awake, alert, cooperative, no apparent distress, and appears stated age  Eyes: Lids and lashes normal, pupils equal, round and reactive to light, extra ocular muscles intact, sclera clear, conjunctiva normal  ENT: Normocephalic, without obvious abnormality, atraumatic, sinuses nontender on palpation, external ears without lesions, oral pharynx with moist mucous membranes, tonsils without erythema or exudates, gums normal and good dentition.  Hematologic / Lymphatic: no cervical lymphadenopathy  Respiratory: No increased work of breathing, good air exchange, clear to auscultation bilaterally, no crackles or wheezing  Cardiovascular: Normal apical impulse, regular rate and rhythm, normal S1 and S2, no S3 or S4, and no murmur noted  GI: No scars, normal bowel sounds, soft, non-distended, non-tender, no masses palpated, no hepatosplenomegally   Skin: no bruising or bleeding  Musculoskeletal: There is no redness, warmth, or swelling of the joints.  Full range of motion noted.  Motor strength is 5 out of 5 all extremities bilaterally.  Tone is normal.  Neurologic: Awake, alert, oriented to name, place and time.  Cranial nerves II-XII are grossly intact.  Motor is 5 out of 5 bilaterally.  Cerebellar finger to nose, heel to shin intact.  Sensory is intact.  Babinski down going, Romberg negative, and gait is normal.  Neuropsychiatric: General: normal, calm and normal eye contact    Data   Data reviewed today: I reviewed all medications, new labs and imaging results over the last 24 hours.   Recent Labs   Lab 12/24/20  1839   WBC 8.7   HGB 14.4   MCV 88         POTASSIUM 3.6   CHLORIDE 109   CO2 29   BUN 15   CR 0.84   ANIONGAP 4   ALVINA 8.5   *   ALBUMIN 3.1*   PROTTOTAL 6.8   BILITOTAL 0.3   ALKPHOS 101   ALT 25   AST 18     Most Recent 3 CBC's:  Recent Labs   Lab Test  12/24/20  1839 12/15/20  1056 12/14/20  1746   WBC 8.7 7.9 10.0   HGB 14.4 16.1 16.7   MCV 88 88 88    249 258     Most Recent 3 BMP's:  Recent Labs   Lab Test 12/24/20 1839 12/15/20  1056 12/14/20  1746    140 141   POTASSIUM 3.6 3.7 3.4   CHLORIDE 109 109 108   CO2 29 26 27   BUN 15 15 13   CR 0.84 0.81 0.89   ANIONGAP 4 5 6   ALVINA 8.5 8.8 8.8   * 111* 98     Most Recent 2 LFT's:  Recent Labs   Lab Test 12/24/20 1839 12/11/20  2225   AST 18 20   ALT 25 34   ALKPHOS 101 102   BILITOTAL 0.3 0.7     Most Recent 3 Creatinines:  Recent Labs   Lab Test 12/24/20  1839 12/15/20  1056 12/14/20  1746   CR 0.84 0.81 0.89     Most Recent 3 Hemoglobins:  Recent Labs   Lab Test 12/24/20  1839 12/15/20  1056 12/14/20  1746   HGB 14.4 16.1 16.7     Most Recent 3 Troponin's:  Recent Labs   Lab Test 12/15/20  1056 12/14/20  1746 12/11/20  2225   TROPI <0.015 <0.015 <0.015     Most Recent 6 Bacteria Isolates From Any Culture (See EPIC Reports for Culture Details):No lab results found.  Most Recent TSH and T4:  Recent Labs   Lab Test 12/15/20  1056   TSH 2.22     Most Recent Hemoglobin A1c:  Recent Labs   Lab Test 12/10/20  0720   A1C 5.2     Most Recent 6 glucoses:  Recent Labs   Lab Test 12/24/20  1839 12/15/20  1056 12/14/20  1746 12/13/20  0653 12/11/20  2225 12/11/20  0435   * 111* 98 93 119* 99     Most Recent Urinalysis:  Recent Labs   Lab Test 12/12/20  0042   COLOR Yellow   APPEARANCE Clear   URINEGLC Negative   URINEBILI Negative   URINEKETONE Negative   SG 1.018   UBLD Trace*   URINEPH 5.5   PROTEIN 10*   NITRITE Negative   LEUKEST Negative   RBCU 4*   WBCU 1     No results found for this or any previous visit (from the past 24 hour(s)).

## 2020-12-25 NOTE — PLAN OF CARE
Discharge instruction reviewed. Patient verbalized understanding. PIV removed, patient ambulated to the main lobby. Lyft ride awaiting at main lobby. Patient discharged with all belongings and medications. VSS.

## 2020-12-25 NOTE — PLAN OF CARE
"-diagnostic tests and consults completed and resulted- not met     -vital signs normal or at patient baseline- not met, BP (!) 149/102 (BP Location: Left arm)   Pulse 60   Temp 98  F (36.7  C) (Oral)   Resp 18   Ht 1.803 m (5' 11\")   Wt 93 kg (205 lb)   SpO2 97%   BMI 28.59 kg/m      -tolerating oral intake to maintain hydration- met    -safe disposition plan has been identified- met    -BP better controlled- not met, BP still running high, provider notified and BP meds adjusted  "

## 2020-12-25 NOTE — PLAN OF CARE
"Outpatient/Observation goals to be met before discharge home:     -diagnostic tests and consults completed and resulted -not met   -vital signs normal or at patient baseline -hypertensive but improved, otherwise VSS   -tolerating oral intake to maintain hydration -met  -safe disposition plan has been identified -prior living once cleared for discharge   -BP better controlled -in progress     BP (!) 155/89 (BP Location: Left arm)   Pulse 60   Temp 98  F (36.7  C) (Oral)   Resp 18   Ht 1.803 m (5' 11\")   Wt 93 kg (205 lb)   SpO2 97%   BMI 28.59 kg/m      "

## 2020-12-25 NOTE — ED NOTES
[facility]    ED Nurse to Floor Handoff     Braulio Guardado is a 65 year old male who speaks English and lives alone,  in a home  They arrived in the ED by car from home    ED Chief Complaint: Hypertension    ED Dx;   Final diagnoses:   Uncontrolled hypertension         Needed?: No    Allergies: No Known Allergies.  Past Medical Hx:   Past Medical History:   Diagnosis Date     Cerebral infarction (H)      Hyperlipidemia      Hypertension       Baseline Mental status: WDL  Current Mental Status changes: at basesline    Infection present or suspected this encounter: no  Sepsis suspected: No  Isolation type: No active isolations  Patient tested for COVID 19 prior to admission: YES     Activity level - Baseline/Home:  Independent  Activity Level - Current:   Independent    Bariatric equipment needed?: No    In the ED these meds were given:   Medications   OLANZapine zydis (zyPREXA) ODT tab 5 mg (5 mg Oral Given 12/24/20 1834)   LORazepam (ATIVAN) injection 0.5 mg (0.5 mg Intravenous Given 12/24/20 2039)   labetalol (NORMODYNE/TRANDATE) injection 10 mg (10 mg Intravenous Given 12/24/20 2106)       Drips running?  No    Home pump  No    Current LDAs       Labs results:   Labs Ordered and Resulted from Time of ED Arrival Up to the Time of Departure from the ED   COMPREHENSIVE METABOLIC PANEL - Abnormal; Notable for the following components:       Result Value    Glucose 105 (*)     Albumin 3.1 (*)     All other components within normal limits   CRP INFLAMMATION - Abnormal; Notable for the following components:    CRP Inflammation 11.0 (*)     All other components within normal limits   CBC WITH PLATELETS DIFFERENTIAL   SARS-COV-2 (COVID-19) VIRUS RT-PCR   CARDIAC CONTINUOUS MONITORING       Imaging Studies: No results found for this or any previous visit (from the past 24 hour(s)).    Recent vital signs:   /80   Pulse 62   Temp 97.6  F (36.4  C) (Oral)    "Resp 23   Ht 1.778 m (5' 10\")   Wt 93 kg (205 lb)   SpO2 95%   BMI 29.41 kg/m      [facility] Coma Scale Score: 15 (12/24/20 1724)       Cardiac Rhythm: Normal Sinus  Pt needs tele? No  Skin/wound Issues: None    Code Status: See EPIC    Pain control: pt had none    Nausea control: pt had none    Abnormal labs/tests/findings requiring intervention:     Family present during ED course? No   Family Comments/Social Situation comments:     Tasks needing completion: None    Kalia Esqueda    [phone] [facility]      "

## 2020-12-25 NOTE — PLAN OF CARE
"-diagnostic tests and consults completed and resulted- met     -vital signs normal or at patient baseline- not met, elevated BP, BP (!) 166/100 (BP Location: Left arm)   Pulse 63   Temp 98.6  F (37  C) (Oral)   Resp 16   Ht 1.803 m (5' 11\")   Wt 93 kg (205 lb)   SpO2 96%   BMI 28.59 kg/m      -tolerating oral intake to maintain hydration- met    -safe disposition plan has been identified- met    -BP better controlled- not met    Pt received medications for discharge.  "

## 2020-12-25 NOTE — DISCHARGE SUMMARY
Discharge Summary    Braulio Guardado MRN# [id number]   YOB: 1955 Age: 65 year old     Date of Admission:  12/24/2020  Date of Discharge:  12/25/2020  5:59 PM  Admitting Physician:  Rosalino Dukes MD  Discharge Physician:  ROSALINO DUKES  Discharging Service:  Emergency Department Observation Unit     Primary Provider: Yoko Rdz          Discharge Diagnosis:     Hypertension, uncontrolled    * No resolved hospital problems. *               Discharge Disposition:   Discharged to home           Condition on Discharge:   Discharge condition: Stable   Code status on discharge: Full Code           Procedures:   No procedures performed during this admission          Discharge Medications:     Current Discharge Medication List      START taking these medications    Details   clonazePAM (KLONOPIN) 0.5 MG tablet Take 1 tablet (0.5 mg) by mouth At Bedtime  Qty: 30 tablet, Refills: 1    Associated Diagnoses: Generalized anxiety disorder      OLANZapine (ZYPREXA) 5 MG tablet Take 0.5-1 tablets (2.5-5 mg) by mouth nightly as needed (Anxiety)  Qty: 30 tablet, Refills: 0    Associated Diagnoses: Generalized anxiety disorder         CONTINUE these medications which have CHANGED    Details   carvedilol (COREG) 25 MG tablet Take 1 tablet (25 mg) by mouth 2 times daily (with meals)  Qty: 60 tablet, Refills: 1    Associated Diagnoses: Hypertension, uncontrolled         CONTINUE these medications which have NOT CHANGED    Details   aspirin (ASA) 81 MG chewable tablet Take 1 tablet (81 mg) by mouth daily  Qty: 30 tablet, Refills: 1    Associated Diagnoses: Cerebrovascular accident (CVA), unspecified mechanism (H)      atorvastatin (LIPITOR) 40 MG tablet Take 1 tablet (40 mg) by mouth every evening  Qty: 30 tablet, Refills: 1    Associated Diagnoses: Cerebrovascular accident (CVA), unspecified mechanism (H)      clopidogrel (PLAVIX) 75 MG tablet Take 1 tablet (75 mg) by mouth  daily  Qty: 90 tablet, Refills: 0    Associated Diagnoses: Cerebrovascular accident (CVA), unspecified mechanism (H)      lisinopril (ZESTRIL) 20 MG tablet Take 1 tablet (20 mg) by mouth daily  Qty: 30 tablet, Refills: 0    Associated Diagnoses: Benign essential hypertension      LORazepam (ATIVAN) 0.5 MG tablet Take 0.5 mg by mouth every 8 hours as needed for anxiety      Cyanocobalamin (VITAMIN B 12 PO) Take 1 tablet by mouth daily         STOP taking these medications       hydrOXYzine (ATARAX) 25 MG tablet Comments:   Reason for Stopping:                     Consultations:   No consultations were requested during this admission             Brief History of Illness:   Braulio Guardado is a 65 year old male admitted on 12/24/2020. He has medical history signficant for HTN, HLD, cerebral infarction, and anxiety.        Hospital Course:   ##. Hypertensive Urgency: Patient was discharged on 12/17/20 on Coreg 3.125 mg po bid and lisinopril increased to 20 mg p.o daily, and Klonopin prn. He was seen at [facility] 12/22/2020 with hypertensive urgency /100. At that visit reported home BPs 170's-180's/100's. Coreg increased to 12.5 mg BID. This AM SBP was 220's/130's and remained in 200 range all afternoon. Ativan and hydroxyzine this afternoon did not help. He shoveled snow last night without difficulty. Advised to come in by nurse. Denies headache, chest pain, shortness of breath, nausea, vomiting, abdominal pain. He has some numbness in his left hand and in his left toes which is ongoing and not new. In ED, HR 60's-80's, 's-180's/90's-130's, RR 15-25, SaO2 92-97% on RA, Temp 97.6. Labs show normal CMP and CBC. CRP 11. EKG unremarkable. Covid19 PCR negative. In the ED the patient was given olanzapine 5mg po x 1, ativan 0.5mg IV x 1, labetalol 10mg IV x 1. He is being admitted for better BP control. Just prior to transfer to ED Obs BP is 129/80. Anxiety does appear to be contributing to his  "HTN, though BP is still not well controlled.   - Increase Coreg to 25 mg BID if BP is still elevated  - Monitor BP  - Follow PCP     ##. Anxiety  ##. PTSD  - Discontinue Hydroxyzine  - Discontinue Seroquel   - Start Zyprexa 2.5-5 mg HS PRN  - Start Klonopin 0.5 mg HS      ##. Multiple subacute-chronic CVAs  Multiple areas of infarct of various ages noted during 12/9/2020 admission. At that time, \"Neurologic exam largely normal other than some mild numbness and tingling in the left fingers, and per Neurology this may be related to focal ulnar nerve issues\" per documentation.   -- continue plavix.      ##. Left-sided 4th, 5th Finger, Foot Numbness: Per patient still present and persistent. Per previous Neurology note, not consistent with stroke syndromes related to lesion seen on head imaging.  More likely to be peripheral neuropathy.    - F/u Neurology clinic visit            Final Day of Progress before Discharge:       Physical Exam:  Blood pressure (!) 166/100, pulse 63, temperature 98.6  F (37  C), temperature source Oral, resp. rate 16, height 1.803 m (5' 11\"), weight 93 kg (205 lb), SpO2 96 %.    EXAM:  Physical Exam   Constitutional: Pt is oriented to person, place, and time.Pt appears well-developed and well-nourished.   HENT:   Head: Normocephalic and atraumatic.   Eyes: Conjunctivae are normal. Pupils are equal, round, and reactive to light.   Neck: Normal range of motion. Neck supple.   Cardiovascular: Normal rate, regular rhythm, normal heart sounds and intact distal pulses.    Pulmonary/Chest: Effort normal and breath sounds normal. No respiratory distress. Pt has no wheezes. Pt has no rales  Abdominal: Soft. Bowel sounds are normal. Pt exhibits no distension and no mass. No tenderness. Pt has no rebound and no guarding.   Musculoskeletal: Normal range of motion. Pt exhibits no edema.   Neurological: Pt is alert and oriented to person, place, and time. Normal reflexes.   Skin: Skin is warm and dry. No " rash noted.   Psychiatric: Pt has a normal mood and affect. Behavior is normal. Judgment and thought content normal.             Data:  All laboratory data reviewed             Significant Results:   None  Results for orders placed or performed during the hospital encounter of 12/24/20   CBC with platelets differential     Status: None   Result Value Ref Range    WBC 8.7 4.0 - 11.0 10e9/L    RBC Count 4.88 4.4 - 5.9 10e12/L    Hemoglobin 14.4 13.3 - 17.7 g/dL    Hematocrit 43.0 40.0 - 53.0 %    MCV 88 78 - 100 fl    MCH 29.5 26.5 - 33.0 pg    MCHC 33.5 31.5 - 36.5 g/dL    RDW 11.8 10.0 - 15.0 %    Platelet Count 249 150 - 450 10e9/L    Diff Method Automated Method     % Neutrophils 68.3 %    % Lymphocytes 21.2 %    % Monocytes 8.7 %    % Eosinophils 1.0 %    % Basophils 0.5 %    % Immature Granulocytes 0.3 %    Nucleated RBCs 0 0 /100    Absolute Neutrophil 5.9 1.6 - 8.3 10e9/L    Absolute Lymphocytes 1.8 0.8 - 5.3 10e9/L    Absolute Monocytes 0.8 0.0 - 1.3 10e9/L    Absolute Eosinophils 0.1 0.0 - 0.7 10e9/L    Absolute Basophils 0.0 0.0 - 0.2 10e9/L    Abs Immature Granulocytes 0.0 0 - 0.4 10e9/L    Absolute Nucleated RBC 0.0    Comprehensive metabolic panel     Status: Abnormal   Result Value Ref Range    Sodium 142 133 - 144 mmol/L    Potassium 3.6 3.4 - 5.3 mmol/L    Chloride 109 94 - 109 mmol/L    Carbon Dioxide 29 20 - 32 mmol/L    Anion Gap 4 3 - 14 mmol/L    Glucose 105 (H) 70 - 99 mg/dL    Urea Nitrogen 15 7 - 30 mg/dL    Creatinine 0.84 0.66 - 1.25 mg/dL    GFR Estimate >90 >60 mL/min/[1.73_m2]    GFR Estimate If Black >90 >60 mL/min/[1.73_m2]    Calcium 8.5 8.5 - 10.1 mg/dL    Bilirubin Total 0.3 0.2 - 1.3 mg/dL    Albumin 3.1 (L) 3.4 - 5.0 g/dL    Protein Total 6.8 6.8 - 8.8 g/dL    Alkaline Phosphatase 101 40 - 150 U/L    ALT 25 0 - 70 U/L    AST 18 0 - 45 U/L   CRP inflammation     Status: Abnormal   Result Value Ref Range    CRP Inflammation 11.0 (H) 0.0 - 8.0 mg/L   Asymptomatic SARS-CoV-2 COVID-19  Virus (Coronavirus) by PCR     Status: None    Specimen: Nasopharyngeal   Result Value Ref Range    SARS-CoV-2 Virus Specimen Source Nasopharyngeal     SARS-CoV-2 PCR Result NEGATIVE     SARS-CoV-2 PCR Comment       Testing was performed using the Xpert Xpress SARS-CoV-2 Assay on the Cepheid Gene-Xpert   Instrument Systems. Additional information about this Emergency Use Authorization (EUA)   assay can be found via the Lab Guide.     EKG 12-lead, tracing only     Status: None   Result Value Ref Range    Interpretation ECG Click View Image link to view waveform and result       No results found for this or any previous visit (from the past 48 hour(s)).             Pending Results:   Unresulted Labs Ordered in the Past 30 Days of this Admission     Date and Time Order Name Status Description    12/13/2020 1300 Metanephrine random or 24 hr urine In process                   Discharge Instructions and Follow-Up:     Discharge Procedure Orders   Reason for your hospital stay   Order Comments: Hypertensive urgency     Follow Up and recommended labs and tests   Order Comments: Follow up with your primary in 3-5 days     Activity   Order Comments: Your activity upon discharge: activity as tolerated     Order Specific Question Answer Comments   Is discharge order? Yes      When to contact your care team   Order Comments: Please go to your nearest emergency room If you were to have a change in the type of chest pain i.e more severe, lasting longer or radiating to your shoulder, arm, neck, jaw or back, shortness of breath or increased pain with breathing, coughing up blood, feel dizzy or lightheaded, or notice swelling in one leg.     Discharge Instructions   Order Comments: You were admitted for hypertension and anxiety. You were treated with antihypertensives. Your blood pressure is currently improving. Your Coreg was increased for 12.5m twice a day to 25 mg twice a day. Take Klonopin 0.5 mg at bedtime and Zyprexa 2.5-5 mg  twice a day as needed for anxiety. Follow up with your primary in 3-5 days. Return to the ED if having worsening symptoms.     Full Code     Order Specific Question Answer Comments   Code status determined by: Discussion with patient/ legal decision maker      Diet   Order Comments: Follow this diet upon discharge: Regular     Order Specific Question Answer Comments   Is discharge order? Yes           Attestation:  Paula Moreno PA-C.

## 2020-12-28 ENCOUNTER — HOSPITAL ENCOUNTER (OUTPATIENT)
Dept: PHYSICAL THERAPY | Facility: CLINIC | Age: 65
Setting detail: THERAPIES SERIES
End: 2020-12-28
Attending: INTERNAL MEDICINE
Payer: COMMERCIAL

## 2020-12-28 DIAGNOSIS — I63.9 CEREBROVASCULAR ACCIDENT (CVA), UNSPECIFIED MECHANISM (H): ICD-10-CM

## 2020-12-28 PROCEDURE — 97162 PT EVAL MOD COMPLEX 30 MIN: CPT | Mod: GP | Performed by: PHYSICAL THERAPIST

## 2020-12-28 PROCEDURE — 97110 THERAPEUTIC EXERCISES: CPT | Mod: GP | Performed by: PHYSICAL THERAPIST

## 2020-12-28 NOTE — PROGRESS NOTES
12/28/20 0800   Quick Adds   Type of Visit Initial OP PT Evaluation   General Information   Start of Care Date 12/28/20   Referring Physician Ana Langley MD    Orders Evaluate and Treat as Indicated   Order Date 12/11/20   Medical Diagnosis Chronic/subacute CVAs with mild motor deficits   Surgical/Medical history reviewed Yes   Pertinent history of current problem (include personal factors and/or comorbidities that impact the POC) ED visits d/t high BP - MRI showed 4 little strokes. Struggling to get BP medication right. Has been there 5 times int he last few weeks. Feeling better this morning. About 6 months ago, the arm/finger and leg. The middle to pinky finger don't work well. Reports decreased strength of the left arm as I cannot use it anymore. Pt reports he is right handed. Have real PTSD and anxiety stuff going on. BP came down naturally due to high anxiety. A few years ago had a fracture int he small toe on the left foot. acts up. Right now, numb in bottom of the foot and on the side. Strength feels okay. SLightly impaired balance, but okay. On blood thinners now. One walk since BP scares. In the morning feels better, fluctuates throughout the day. Has new BP machine coming in the mail tomorrow    Pertinent Visual History  Wears glasses, up to date.    Previous/Current Treatment   (PT in the past for L ankle )   Patient role/Employment history   (Artist )   Living environment House/[facility]   Home/Community Accessibility Comments One floor house, couple steps to get in/out    Patient/Family Goals Statement To get more function back in this hand, and address how it is related to my foot.    General Information Comments Meditation 2x/day, which helps me calm down.    Fall Risk Screen   Fall screen completed by PT   Have you fallen 2 or more times in the past year? No   Have you fallen and had an injury in the past year? No   Is patient a fall risk? No   Pain   Pain comments No pain but arm feels  tight/heavy   Vitals Signs   Heart Rate 65   SpO2 98   Blood Pressure 180/116   Vital Signs Comments Pt is currently having high BP issues - is being followed by MD for frequent BP checks and will be getting a new BP cuff in the mail on 12/29/20   Cognitive Status Examination   Orientation orientation to person, place and time   Level of Consciousness alert   Follows Commands and Answers Questions 100% of the time   Personal Safety and Judgment intact   Memory intact   Cognitive Comment Pt reports having high anxiety and PTSD    Integumentary   Integumentary No deficits were identified   Palpation   Palpation mild + ulnar glide on L    Range of Motion (ROM)   ROM Comment UE WFL's    Strength   Strength Comments LUE weaker than RUE: shoulder abd: 4/5, shoulder flex: 4-/5, elbow flex/ext: 4+/5, wrist ext: 3/5, finger separation: 3/5. LE's: 5/5 bilaterally.    Bed Mobility   Bed Mobility Comments Independnet with mat mobility    Transfer Skills   Transfer Comments Independent    Gait   Gait Comments Pt ambulates with normal gait speed and mechanics.    Balance   Balance Comments Not formally assessed today    Sensory Examination   Sensory Perception Comments Reports numbness/tingling in L digits 3-5 and sometimes in L hand and arm. Some numbness/tingling in L 5th digit of foot and bottom of foot.    Coordination   Coordination Comments finger to nose: delayed on L side, alternating   Muscle Tone   Muscle Tone Comments mild spasticity on elbow extension of L UE    Planned Therapy Interventions   Planned Therapy Interventions balance training;gait training;fine motor coordination training;neuromuscular re-education;ROM;strengthening;stretching;transfer training   Clinical Impression   Criteria for Skilled Therapeutic Interventions Met yes, treatment indicated   PT Diagnosis L UE/LE deficits s/p CVA's   Influenced by the following impairments dec coordination, weakness, numbness, gait    Functional limitations due to  impairments walking long distances, dressing, fine motor tasks, gripping objects, shoveling, stairs,    Clinical Presentation Evolving/Changing   Clinical Presentation Rationale Continued BP control issues impacting mobility/anxiety   Clinical Decision Making (Complexity) Moderate complexity   Therapy Frequency 1 time/week   Predicted Duration of Therapy Intervention (days/wks) Up to 90 days    Risk & Benefits of therapy have been explained Yes   Patient, Family & other staff in agreement with plan of care Yes   Clinical Impression Comments Pt will benefit from skilled PT services in order to improve function/mobility of LUE/LE to return to usual activities.   GOALS   PT Eval Goals 1;2;3   Goal 1   Goal Identifier L UE strength    Goal Description Pt will score 5/5 on all UE MMT in order to improve srength to perform daily activities such as shoveling, dressing, lifting, etc.    Target Date 03/27/21   Goal 2   Goal Identifier HEP/ education   Goal Description Pt to be independent in HEP to improve strength and stamina and demo/verbalize being able to monitor his own BP daily (also during exercise) to ensure safety of exercise progression.   Target Date 03/27/21   Goal 3   Goal Identifier Walking    Goal Description Pt to report resuming normal walking routine [place] the river with no imbalance and demo improved function/movement of L ankle, espeically DF movement.    Target Date 03/27/21   Total Evaluation Time   PT Markel Moderate Complexity Minutes (26848) 35

## 2020-12-31 ENCOUNTER — HOSPITAL ENCOUNTER (OUTPATIENT)
Dept: OCCUPATIONAL THERAPY | Facility: CLINIC | Age: 65
Setting detail: THERAPIES SERIES
End: 2020-12-31
Attending: INTERNAL MEDICINE
Payer: COMMERCIAL

## 2020-12-31 VITALS — SYSTOLIC BLOOD PRESSURE: 183 MMHG | DIASTOLIC BLOOD PRESSURE: 116 MMHG | HEART RATE: 60 BPM

## 2020-12-31 PROCEDURE — 97166 OT EVAL MOD COMPLEX 45 MIN: CPT | Mod: GO | Performed by: OCCUPATIONAL THERAPIST

## 2020-12-31 PROCEDURE — 97535 SELF CARE MNGMENT TRAINING: CPT | Mod: GO | Performed by: OCCUPATIONAL THERAPIST

## 2020-12-31 ASSESSMENT — ACTIVITIES OF DAILY LIVING (ADL): IADL_QUICK_ADDS: MEAL PLANNING/PREPARATION;HOME/FINANCIAL/MANAGEMENT;COMMUNICATION/COMPUTER USE;COMMUNITY MOBILITY
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
## 2020-12-31 NOTE — PROGRESS NOTES
[facility]          OUTPATIENT OCCUPATIONAL THERAPY  EVALUATION  PLAN OF TREATMENT FOR OUTPATIENT REHABILITATION  (COMPLETE FOR INITIAL CLAIMS ONLY)  Patient's Last Name, First Name, M.I.  YOB: 1955  GeoBraulio  YAW                        Provider's Name  [facility] Medical Record No.  [id number]                               Onset Date:   12/9/2021      Start of Care Date:     12/31/20   Type:     ___PT   _X_OT   ___SLP Medical Diagnosis:     Chronic/subacute CVAs with mild motor deficits                          OT Diagnosis:     impaired ADLs and IADLs Visits from SOC:  1   _________________________________________________________________________________  Plan of Treatment/Functional Goals:  ADL training, IADL training, Neuromuscular re-education, Self care/Home management, Strengthening, Therapeutic activities                    Goals  Goal Identifier: AE for showering  Goal Description: Pt will verbalize understanding of recommendations for type and placement of grab bars in his tub/shower, for increased safety and independence with bathing.  Target Date: 01/31/21     Goal Identifier: coordination  Goal Description: Pt will demo improved L hand dexterity, as measured by a 6 point improvement in 9 hole peg score (starting score 34).    Target Date: 01/31/21     Goal Identifier: dysmetria  Goal Description: Pt will demonstrate decreased symptoms of dysmetria when reaching to targets, as measured by scoring 55 or above on Dynavision Mode A testing.   Target Date: 01/31/21     Goal Identifier: med management  Goal Description: Pt will implement at least 2 strategies for increased accuracy of taking daily medications, for decreased anxiety and med errors, and improved control of HTN.   Target Date: 01/31/21        Therapy Frequency: weekly     Predicted Duration of Therapy  Intervention (days/wks): 4 weeks  Doug Ang OT          I CERTIFY THE NEED FOR THESE SERVICES FURNISHED UNDER        THIS PLAN OF TREATMENT AND WHILE UNDER MY CARE     (Physician co-signature of this document indicates review and certification of the therapy plan).                 ,    Certification date from: 12/31/20, Certification date to: 01/31/21               Referring Physician: Ana Langley MD     Initial Assessment        See Epic Evaluation      Start Of Care Date: 12/31/20

## 2021-01-07 LAB
COLLECT DURATION TIME SPEC: 24 H
CREAT 24H UR-MRATE: 1330 MG/D (ref 800–2100)
CREAT UR-MCNC: 266 MG/DL
METANEPH 24H UR-MCNC: 354 UG/L
METANEPH 24H UR-MRATE: 177 UG/D (ref 55–320)
METANEPH+NORMETANEPH UR-IMP: NORMAL
METANEPH/CREAT 24H UR: 133 UG/G CRT (ref 0–300)
NORMETANEPHRINE 24H UR-MCNC: 584 UG/L
NORMETANEPHRINE 24H UR-MRATE: 292 UG/D (ref 114–865)
NORMETANEPHRINE/CREAT 24H UR: 220 UG/G CRT (ref 0–400)

## 2021-01-08 DIAGNOSIS — I10 ESSENTIAL HYPERTENSION: Primary | ICD-10-CM

## 2021-01-09 ENCOUNTER — NURSE TRIAGE (OUTPATIENT)
Dept: NURSING | Facility: CLINIC | Age: 66
End: 2021-01-09

## 2021-01-09 NOTE — TELEPHONE ENCOUNTER
Triage  Call     Calling with question about new medication.  Pt has already talked to his provider about his concern, and already sent another message to PCP regarding B/P med question.    Antoinette Pastrana RN    Reason for Disposition    [1] Caller requesting NON-URGENT health information AND [2] PCP's office is the best resource    Protocols used: INFORMATION ONLY CALL-A-AH

## 2021-01-11 ENCOUNTER — HOSPITAL ENCOUNTER (OUTPATIENT)
Dept: PHYSICAL THERAPY | Facility: CLINIC | Age: 66
Setting detail: THERAPIES SERIES
End: 2021-01-11
Attending: INTERNAL MEDICINE
Payer: COMMERCIAL

## 2021-01-11 PROCEDURE — 97750 PHYSICAL PERFORMANCE TEST: CPT | Mod: GP | Performed by: PHYSICAL THERAPIST

## 2021-01-11 PROCEDURE — 97110 THERAPEUTIC EXERCISES: CPT | Mod: GP | Performed by: PHYSICAL THERAPIST

## 2021-01-12 NOTE — PROGRESS NOTES
01/11/21 0900   Signing Clinician's Name / Credentials   Signing clinician's name / credentials Gisselle Woody, DPT, NCS   Functional Gait Assessment (HARLEEN Alvarado, LANEY Rouse, et al. (2004))   1. GAIT LEVEL SURFACE 3  (5.3 secs)   2. CHANGE IN GAIT SPEED 3   3. GAIT WITH HORIZONTAL HEAD TURNS 2   4. GAIT WITH VERTICAL HEAD TURNS 2   5. GAIT AND PIVOT TURN 3   6. STEP OVER OBSTACLE 3   7. GAIT WITH NARROW BASE OF SUPPORT 3   8. GAIT WITH EYES CLOSED 2   9. AMBULATING BACKWARDS 2   10. STEPS 3   Total Functional Gait Assessment Score   TOTAL SCORE: (MAXIMUM SCORE 30) 26     Functional Gait Assessment (FGA): The FGA assesses postural stability during various walking tasks.   Gait assistive device used: None    Patient Score: 26/30  Scores of <22/30 have been correlated with predicting falls in community-dwelling older adults according to Christiano & Bib 2010.   Scores of <18/30 have been correlated with increased risk for falls in patients with Parkinsons Disease according to Jose Nicole Zhou et al 2014.  Minimal Detectable Change for patients with acute/chronic stroke = 4.2 according to Gibson & Prasannaschel 2009  Minimal Detectable Change for patients with vestibular disorder = 8 according to Christiano & Bib 2010    Assessment (rationale for performing, application to patient s function & care plan): assess falls risk, balance challenges.  Minutes billed as physical performance test: 15

## 2021-01-14 ENCOUNTER — HOSPITAL ENCOUNTER (OUTPATIENT)
Dept: OCCUPATIONAL THERAPY | Facility: CLINIC | Age: 66
Setting detail: THERAPIES SERIES
End: 2021-01-14
Attending: INTERNAL MEDICINE
Payer: COMMERCIAL

## 2021-01-14 VITALS — DIASTOLIC BLOOD PRESSURE: 93 MMHG | SYSTOLIC BLOOD PRESSURE: 149 MMHG | HEART RATE: 66 BPM

## 2021-01-14 PROCEDURE — 97112 NEUROMUSCULAR REEDUCATION: CPT | Mod: GO | Performed by: OCCUPATIONAL THERAPIST

## 2021-01-15 ENCOUNTER — HEALTH MAINTENANCE LETTER (OUTPATIENT)
Age: 66
End: 2021-01-15

## 2021-01-18 ENCOUNTER — HOSPITAL ENCOUNTER (OUTPATIENT)
Dept: PHYSICAL THERAPY | Facility: CLINIC | Age: 66
Setting detail: THERAPIES SERIES
End: 2021-01-18
Attending: INTERNAL MEDICINE
Payer: COMMERCIAL

## 2021-01-18 PROCEDURE — 97112 NEUROMUSCULAR REEDUCATION: CPT | Mod: GP | Performed by: PHYSICAL THERAPIST

## 2021-01-21 ENCOUNTER — HOSPITAL ENCOUNTER (OUTPATIENT)
Dept: OCCUPATIONAL THERAPY | Facility: CLINIC | Age: 66
Setting detail: THERAPIES SERIES
End: 2021-01-21
Attending: INTERNAL MEDICINE
Payer: COMMERCIAL

## 2021-01-21 PROCEDURE — 97535 SELF CARE MNGMENT TRAINING: CPT | Mod: GO | Performed by: OCCUPATIONAL THERAPIST

## 2021-01-21 PROCEDURE — 97112 NEUROMUSCULAR REEDUCATION: CPT | Mod: GO | Performed by: OCCUPATIONAL THERAPIST

## 2021-01-28 ENCOUNTER — HOSPITAL ENCOUNTER (OUTPATIENT)
Dept: OCCUPATIONAL THERAPY | Facility: CLINIC | Age: 66
Setting detail: THERAPIES SERIES
End: 2021-01-28
Attending: INTERNAL MEDICINE
Payer: COMMERCIAL

## 2021-01-28 PROCEDURE — 97110 THERAPEUTIC EXERCISES: CPT | Mod: GO | Performed by: OCCUPATIONAL THERAPIST

## 2021-01-28 NOTE — DISCHARGE INSTRUCTIONS
Instructions from OT on January 28, 2021      1. Follow up with Neurology clinic regarding an EMG test.   I have sent Dr. Gordillo a message, but if you do not hear back by the end of the week, you may want to call the clinic. Having this EMG will help to identify the cause of your Left arm symptoms, so we can better tailor our rehab strategies.    [facility]  [address] [address]    Department Phone: [phone]     2. For OT exercises, I want you to focus on        1. Weight bearing exercises through the Left arm.  This can be either pushing into your arm against the wall or tabletop, and eventually working toward floor exercises. Attempt 3 times a day. Hold 10 seconds, for 3-5 reps.         2.  Half Round foam at your upper back, for opening of your chest/shoulders.          3.  Dowel exercises: Lifting overhead 10 reps, 2 times per day        4. Dowel behind your back with rotating your spine        5. Continued tactile input and/or compression through your hand (Brush/ball)        (6. Follow up with PT regarding the correct form for nerve glides for your shoulder)

## 2021-01-28 NOTE — IP AVS SNAPSHOT
MRN:[id number]                      After Visit Summary   1/28/2021    Braulio Guardado    MRN: [id number]           Visit Information        Provider Department      1/28/2021 10:30 AM Doug Ang OT [facility]        Your next 10 appointments already scheduled    Feb 01, 2021  9:30 AM  Neuro Treatment with Sheila Woody PT  [facility] ([facility] ) [address]  [place] [address]  [phone]      Feb 04, 2021 10:30 AM  Neuro Treatment with Doug Ang OT  [facility] ([facility] ) [address], [address]  [place] [address]  [phone]           Further instructions from your care team       Instructions from OT on January 28, 2021      1. Follow up with Neurology clinic regarding an EMG test.   I have sent Dr. Gordillo a message, but if you do not hear back by the end of the week, you may want to call the clinic. Having this EMG will help to identify the cause of your Left arm symptoms, so we can better tailor our rehab strategies.    [facility]  [address] [address]    Department Phone: [phone]     2. For OT exercises, I want you to focus on        1. Weight bearing exercises through the Left arm.  This can be either pushing into your arm against the wall or tabletop, and eventually working toward floor exercises. Attempt 3 times a day. Hold 10 seconds, for 3-5 reps.         2.  Half Round foam at your upper back, for opening of your chest/shoulders.          3.  Dowel exercises: Lifting overhead 10 reps, 2 times per day        4. Dowel behind your back with rotating your spine        5. Continued tactile input and/or compression through your hand (Brush/ball)        (6. Follow  up with PT regarding the correct form for nerve glides for your shoulder)        [employer] Information    [employer] gives you secure access to your electronic health record. If you see a primary care provider, you can also send messages to your care team and make appointments. If you have questions, please call your primary care clinic.  If you do not have a primary care provider, please call [phone] and they will assist you.       Care EveryWhere ID    This is your Care EveryWhere ID. This could be used by other organizations to access your [facility] medical records  [id number]       Equal Access to Services    [facility] : Hadii monica watters hadasho Soomaali, waaxda luqadaha, qaybta kaalmada rian, kosta barnes . So [facility] [phone].    ATENCIÓN: Si habla español, tiene a grant disposición servicios gratuitos de asistencia lingüística. Llame al [phone].    We comply with applicable federal and state civil rights laws, including the [place] Human Rights Act. We do not discriminate on the basis of race, color, creed, Orthodox, national origin, marital status, age, disability, sex, sexual orientation, or gender identity.    If you would like an itemization of your charges they will now be available in [employer] 30 days after discharge. To access the itemized statements in [employer] go to billing/billing summary. From there select view account. There will be multiple tabs showing an overview of your account, detail, payments, and communications. From the communications tab you can see your monthly statements, your itemized statements, and any billing letters generated for your account. If you do not have a [employer] account and need help getting access please contact [employer] support at [phone].  If you would prefer to have your itemized statements mailed please contact our automated itemized bill request line at [phone] option  2.

## 2021-02-01 ENCOUNTER — OFFICE VISIT (OUTPATIENT)
Dept: NEUROLOGY | Facility: CLINIC | Age: 66
End: 2021-02-01
Payer: COMMERCIAL

## 2021-02-01 ENCOUNTER — HOSPITAL ENCOUNTER (OUTPATIENT)
Dept: PHYSICAL THERAPY | Facility: CLINIC | Age: 66
Setting detail: THERAPIES SERIES
End: 2021-02-01
Attending: INTERNAL MEDICINE
Payer: COMMERCIAL

## 2021-02-01 DIAGNOSIS — R20.2 NUMBNESS AND TINGLING IN LEFT HAND: ICD-10-CM

## 2021-02-01 DIAGNOSIS — R29.898 LEFT HAND WEAKNESS: Primary | ICD-10-CM

## 2021-02-01 DIAGNOSIS — R20.0 NUMBNESS AND TINGLING IN LEFT HAND: ICD-10-CM

## 2021-02-01 PROCEDURE — 97112 NEUROMUSCULAR REEDUCATION: CPT | Mod: GP | Performed by: PHYSICAL THERAPIST

## 2021-02-01 PROCEDURE — 95886 MUSC TEST DONE W/N TEST COMP: CPT | Performed by: PSYCHIATRY & NEUROLOGY

## 2021-02-01 PROCEDURE — 95910 NRV CNDJ TEST 7-8 STUDIES: CPT | Performed by: PSYCHIATRY & NEUROLOGY

## 2021-02-01 NOTE — PROGRESS NOTES
[facility]  Electrodiagnostic Laboratory    Nerve Conduction & EMG Report          Patient:       Braulio Guardado  Patient ID:    [id number]  Gender:        Male  YOB: 1955  Age:           65 Years 2 Months        History & Examination:  65 year old man with numbness and weakness in his left hand. Onset about 6 months ago. Evaluate for focal neuropathy vs radiculopathy.     Techniques: Motor and sensory conduction studies were done with surface recording electrodes. EMG was done with a concentric needle electrode.      Results:  Nerve conduction studies:  1. Left median-D2, ulnar-D5, and radial sensory responses are normal.   2. Left median-ulnar palmar interlatency difference is normal.   3. Left median-APB, ulnar-FDI, and ulnar-ADM motor responses are normal. A Timothy Ivan Anastomosis was noted with the left ulnar-FDI recording. This is a normal anatomic variant.     Needle EMG of selected proximal and distal left upper limb muscles was performed as tabulated below. No abnormal spontaneous activity was observed in the sampled muscles. Motor unit potential morphology and recruitment patterns were normal.     Interpretation:  This is a normal study. There is no electrophysiologic evidence of a focal neuropathy or cervical radiculopathy affecting the left upper limb on the basis of this study.      Chaparro Pompa MD  Department of Neurology       Sensory NCS      Nerve / Sites Rec. Site Onset Peak Ref. NP Amp Ref. PP Amp Dist Adrien Ref. Temp     ms ms ms  V  V  V cm m/s m/s  C   L MEDIAN - Dig II Anti      Wrist Dig II 2.40 3.39  19.5 10.0 30.9 14 58.4 48.0 32.9   L ULNAR - Dig V Anti      Wrist Dig V 2.29 3.13  15.1 8.0 21.7 12.5 54.5 48.0 32.6   L RADIAL - Snuff      Forearm Snuff 1.93 2.55  18.7 15.0 31.3 10 51.9 48.0 32.9   L MEDIAN - Ulnar - Palmar      Median Wrist 1.25 1.82 2.40 18.4  32.7 8 64.0  32.9      Ulnar Wrist 1.15 1.61 2.40 13.4  21.0 8 69.8  23.6       Motor NCS       Nerve / Sites Rec. Site Lat Ref. Amp Ref. Rel Amp Dist Adrien Ref. Dur. Area Temp.     ms ms mV mV % cm m/s m/s ms %  C   L MEDIAN - APB      Wrist APB 3.49 4.40 10.3 5.0 100 8   5.94 100 32.6      Elbow APB 7.55  10.6  102 22 54.2 48.0 6.35 98.5 32.9   L ULNAR - ADM      Wrist ADM 2.86 3.50 14.1 5.0 100 8   6.61 100 32.9      B.Elbow ADM 6.46  12.9  91.5 21 58.4 48.0 6.77 102 32.9      A.Elbow ADM 7.92  12.5  89 9 61.7 48.0 6.93 97.6 33   L ULNAR - FDI      Wrist FDI 3.39  14.4  100    5.16 100       B.Elbow FDI 7.19  10.0  69.3 22 57.9  4.95 386       A.Elbow FDI 9.22  9.6  66.7 12 59.1  5.10 71       Median wrist FDI 3.91  0.8  5.82    3.59 6.58       Median elbow FDI 8.39  3.3  22.6    4.17 20.6        EMG Summary Table     Spontaneous Recruitment MUAP    IA Fib PSW Fasc H.F. Pattern Amp Dur. PPP   L. DELTOID N None None None None N N N N   L. BICEPS N None None None None N N N N   L. TRICEPS N None None None None N N N N   L. PRON TERES N None None None None N N N N   L. FIRST D INTEROSS N None None None None N N N N

## 2021-02-01 NOTE — LETTER
2/1/2021       RE: Braulio Guardado  [address]  [place] [address]     Dear Colleague,    Thank you for referring your patient, Braulio Guardado, to the [facility] EMG CLINIC [place] at [facility]. Please see a copy of my visit note below.        [facility]  Electrodiagnostic Laboratory    Nerve Conduction & EMG Report          Patient:       Braulio Guardado  Patient ID:    [id number]  Gender:        Male  YOB: 1955  Age:           65 Years 2 Months        History & Examination:  65 year old man with numbness and weakness in his left hand. Onset about 6 months ago. Evaluate for focal neuropathy vs radiculopathy.     Techniques: Motor and sensory conduction studies were done with surface recording electrodes. EMG was done with a concentric needle electrode.      Results:  Nerve conduction studies:  1. Left median-D2, ulnar-D5, and radial sensory responses are normal.   2. Left median-ulnar palmar interlatency difference is normal.   3. Left median-APB, ulnar-FDI, and ulnar-ADM motor responses are normal. A Timothy Ivan Anastomosis was noted with the left ulnar-FDI recording. This is a normal anatomic variant.     Needle EMG of selected proximal and distal left upper limb muscles was performed as tabulated below. No abnormal spontaneous activity was observed in the sampled muscles. Motor unit potential morphology and recruitment patterns were normal.     Interpretation:  This is a normal study. There is no electrophysiologic evidence of a focal neuropathy or cervical radiculopathy affecting the left upper limb on the basis of this study.      Chaparro Pompa MD  Department of Neurology       Sensory NCS      Nerve / Sites Rec. Site Onset Peak Ref. NP Amp Ref. PP Amp Dist Adrien Ref. Temp     ms ms ms  V  V  V cm m/s m/s  C   L MEDIAN - Dig II Anti      Wrist Dig II 2.40 3.39  19.5 10.0 30.9 14 58.4 48.0 32.9   L ULNAR - Dig V Anti      Wrist  Dig V 2.29 3.13  15.1 8.0 21.7 12.5 54.5 48.0 32.6   L RADIAL - Snuff      Forearm Snuff 1.93 2.55  18.7 15.0 31.3 10 51.9 48.0 32.9   L MEDIAN - Ulnar - Palmar      Median Wrist 1.25 1.82 2.40 18.4  32.7 8 64.0  32.9      Ulnar Wrist 1.15 1.61 2.40 13.4  21.0 8 69.8  23.6       Motor NCS      Nerve / Sites Rec. Site Lat Ref. Amp Ref. Rel Amp Dist Adrien Ref. Dur. Area Temp.     ms ms mV mV % cm m/s m/s ms %  C   L MEDIAN - APB      Wrist APB 3.49 4.40 10.3 5.0 100 8   5.94 100 32.6      Elbow APB 7.55  10.6  102 22 54.2 48.0 6.35 98.5 32.9   L ULNAR - ADM      Wrist ADM 2.86 3.50 14.1 5.0 100 8   6.61 100 32.9      B.Elbow ADM 6.46  12.9  91.5 21 58.4 48.0 6.77 102 32.9      A.Elbow ADM 7.92  12.5  89 9 61.7 48.0 6.93 97.6 33   L ULNAR - FDI      Wrist FDI 3.39  14.4  100    5.16 100       B.Elbow FDI 7.19  10.0  69.3 22 57.9  4.95 386       A.Elbow FDI 9.22  9.6  66.7 12 59.1  5.10 71       Median wrist FDI 3.91  0.8  5.82    3.59 6.58       Median elbow FDI 8.39  3.3  22.6    4.17 20.6        EMG Summary Table     Spontaneous Recruitment MUAP    IA Fib PSW Fasc H.F. Pattern Amp Dur. PPP   L. DELTOID N None None None None N N N N   L. BICEPS N None None None None N N N N   L. TRICEPS N None None None None N N N N   L. PRON TERES N None None None None N N N N   L. FIRST D INTEROSS N None None None None N N N N                      Again, thank you for allowing me to participate in the care of your patient.      Sincerely,    Chaparro Pompa MD

## 2021-02-03 ENCOUNTER — MYC MEDICAL ADVICE (OUTPATIENT)
Dept: NEUROLOGY | Facility: CLINIC | Age: 66
End: 2021-02-03

## 2021-02-04 ENCOUNTER — HOSPITAL ENCOUNTER (OUTPATIENT)
Dept: OCCUPATIONAL THERAPY | Facility: CLINIC | Age: 66
Setting detail: THERAPIES SERIES
End: 2021-02-04
Attending: INTERNAL MEDICINE
Payer: COMMERCIAL

## 2021-02-04 PROCEDURE — 97112 NEUROMUSCULAR REEDUCATION: CPT | Mod: GO | Performed by: OCCUPATIONAL THERAPIST

## 2021-02-04 NOTE — IP AVS SNAPSHOT
"                  MRN:[id number]                      After Visit Summary   2/4/2021    Braulio Guardado    MRN: [id number]           Visit Information        Provider Department      2/4/2021 10:30 AM Doug Ang, OT [facility]        Your next 10 appointments already scheduled    Feb 15, 2021  9:00 AM  Neuro Treatment with Elvira Aquino, PT  [facility] ([facility] ) [address], [address]  [place] [address]  [phone]      Mar 01, 2021 11:00 AM  Neuro Treatment with Sheila Woody, PT  [facility] ([facility] ) [address], [address]  [place] [address]  [phone]      Mar 15, 2021  9:30 AM  Neuro Treatment with Sheila Woody, PT  [facility] ([facility] ) [address], [address]  [place] [address]  [phone]           Further instructions from your care team       OT instructions from February 4, 2021      1. \"Bird dog\" pose:  Start on hands and knees. Hands should be approximately below your shoulders, and knees should be under hips.  Lift up one arm and hold for 3 slow breaths.  Then lift up the other arm and hold for 3 slow breaths.   2. Forward rocking:  Start on hands and knees. Hands should be approximately below your shoulders, and knees should be under hips. Rock forward a few inches, as if your nose is pulling forward.  Hold for 3 slow breaths.   3. Go into \"child's pose\" for 5 slow breaths.  Think about keeping your shoulders down.  If your left shoulder feels painful, try to lessen the stretch with your body position.  4. Continue with the half round foam, for 10-15 slow breaths.  5. Continue with using tactile input (ball, washcloth, brush) throughout the Left " hand.     [employer] Information    [employer] gives you secure access to your electronic health record. If you see a primary care provider, you can also send messages to your care team and make appointments. If you have questions, please call your primary care clinic.  If you do not have a primary care provider, please call [phone] and they will assist you.       Care EveryWhere ID    This is your Care EveryWhere ID. This could be used by other organizations to access your [place] medical records  [id number]       Equal Access to Services    MED ASCENCIO : Hadii aad ku hadasho Soomaali, waaxda luqadaha, qaybta kaalmada adeegyada, waxay barbarain hayaan adebaljinder patiño. So [facility] [phone].    ATENCIÓN: Si habla español, tiene a grant disposición servicios gratuitos de asistencia lingüística. Llame al [phone].    We comply with applicable federal and state civil rights laws, including the [place] Human Rights Act. We do not discriminate on the basis of race, color, creed, Yarsanism, national origin, marital status, age, disability, sex, sexual orientation, or gender identity.    If you would like an itemization of your charges they will now be available in [employer] 30 days after discharge. To access the itemized statements in [employer] go to billing/billing summary. From there select view account. There will be multiple tabs showing an overview of your account, detail, payments, and communications. From the communications tab you can see your monthly statements, your itemized statements, and any billing letters generated for your account. If you do not have a [employer] account and need help getting access please contact [employer] support at [phone].  If you would prefer to have your itemized statements mailed please contact our automated itemized bill request line at [phone] option  2.

## 2021-02-04 NOTE — DISCHARGE INSTRUCTIONS
"OT instructions from February 4, 2021      1. \"Bird dog\" pose:  Start on hands and knees. Hands should be approximately below your shoulders, and knees should be under hips.  Lift up one arm and hold for 3 slow breaths.  Then lift up the other arm and hold for 3 slow breaths.   2. Forward rocking:  Start on hands and knees. Hands should be approximately below your shoulders, and knees should be under hips. Rock forward a few inches, as if your nose is pulling forward.  Hold for 3 slow breaths.   3. Go into \"child's pose\" for 5 slow breaths.  Think about keeping your shoulders down.  If your left shoulder feels painful, try to lessen the stretch with your body position.  4. Continue with the half round foam, for 10-15 slow breaths.  5. Continue with using tactile input (ball, washcloth, brush) throughout the Left hand.   "

## 2021-02-11 ENCOUNTER — HOSPITAL ENCOUNTER (OUTPATIENT)
Dept: OCCUPATIONAL THERAPY | Facility: CLINIC | Age: 66
Setting detail: THERAPIES SERIES
End: 2021-02-11
Attending: INTERNAL MEDICINE
Payer: COMMERCIAL

## 2021-02-11 PROCEDURE — 97112 NEUROMUSCULAR REEDUCATION: CPT | Mod: GO | Performed by: OCCUPATIONAL THERAPIST

## 2021-02-18 ENCOUNTER — HOSPITAL ENCOUNTER (OUTPATIENT)
Dept: OCCUPATIONAL THERAPY | Facility: CLINIC | Age: 66
Setting detail: THERAPIES SERIES
End: 2021-02-18
Attending: INTERNAL MEDICINE
Payer: COMMERCIAL

## 2021-02-18 PROCEDURE — 97112 NEUROMUSCULAR REEDUCATION: CPT | Mod: GO | Performed by: OCCUPATIONAL THERAPIST

## 2021-02-18 NOTE — DISCHARGE INSTRUCTIONS
"OT instructions for February 18, 2021    1. Continue with your weight bearing exercises:  4 point (tabletop) position with lifting each arm forward; Rocking forward so you are bringing your weight more over your arms, and then rocking back on your heels so you get a slight stretch in your shoulder.  Keep this in a \"pain-free\" range and try to keep your shoulder blades down (\"toward your pockets\").  2. Look into getting a GripMaster finger strengthener (red, medium resistance) which is available on [employer].  3. Ball bouncing:  Start with dropping and catching a tennis ball with BOTH hands at the middle of your body.  If you can do this 15 times without losing control of the ball, then progress to dropping and catching with your left hand only, 15 times.   "

## 2021-02-18 NOTE — IP AVS SNAPSHOT
"                  MRN:[id number]                      After Visit Summary   2/18/2021    Braulio Guardado    MRN: [id number]           Visit Information        Provider Department      2/18/2021 10:30 AM Doug Ang, OT [facility]        Your next 10 appointments already scheduled    Feb 25, 2021  9:00 AM  Neuro Treatment with Deborah Baptiste, OT  [facility] ([facility] ) [address], [address]  [place] [address]  [phone]      Mar 01, 2021 11:00 AM  Neuro Treatment with Sheila Woody, PT  [facility] ([facility] ) [address] [place] [address] [address]  [place] [address]  [phone]      Mar 04, 2021 10:00 AM  Neuro Treatment with Deborah Baptiste, OT  [facility] ([facility] ) [address] [place] [address], [address]  [place] [address]  [phone]      Mar 15, 2021  9:30 AM  Neuro Treatment with Sheila Woody, PT  [facility] ([facility] ) [address]  [place] [address]  [phone]           Further instructions from your care team       OT instructions for February 18, 2021    1. Continue with your weight bearing exercises:  4 point (tabletop) position with lifting each arm forward; Rocking forward so you are bringing your weight more over your arms, and then rocking back on your heels so you get a slight stretch in your shoulder.  Keep this in a \"pain-free\" range and try to keep your shoulder blades down (\"toward your pockets\").  2. Look into getting a GripMaster finger strengthener (red, medium resistance) which is available on [employer].  3. Ball bouncing:  Start with dropping and catching a tennis " ball with BOTH hands at the middle of your body.  If you can do this 15 times without losing control of the ball, then progress to dropping and catching with your left hand only, 15 times.     [employer] Information    [employer] gives you secure access to your electronic health record. If you see a primary care provider, you can also send messages to your care team and make appointments. If you have questions, please call your primary care clinic.  If you do not have a primary care provider, please call [phone] and they will assist you.       Care EveryWhere ID    This is your Care EveryWhere ID. This could be used by other organizations to access your [place] medical records  [id number]       Equal Access to Services    MED ASCENCIO : Cele Temple, berta rodriguez, jerome modi, kosta patiño. So [facility] [phone].    ATENCIÓN: Si habla español, tiene a grant disposición servicios gratuitos de asistencia lingüística. Llame al [phone].    We comply with applicable federal and state civil rights laws, including the [place] Human Rights Act. We do not discriminate on the basis of race, color, creed, Mosque, national origin, marital status, age, disability, sex, sexual orientation, or gender identity.    If you would like an itemization of your charges they will now be available in [employer] 30 days after discharge. To access the itemized statements in [employer] go to billing/billing summary. From there select view account. There will be multiple tabs showing an overview of your account, detail, payments, and communications. From the communications tab you can see your monthly statements, your itemized statements, and any billing letters generated for your account. If you do not have a [employer] account and need help getting access please contact [employer] support at [phone].  If you would prefer to have your itemized statements mailed please contact our  automated itemized bill request line at [phone] option  2.

## 2021-02-22 NOTE — PROGRESS NOTES
Outpatient Occupational Therapy Progress Note     Patient: Braulio Guardado  : 1955    Beginning/End Dates of Reporting Period:  20 to 2021    Referring Provider: Ana Langley MD    Therapy Diagnosis: Chronic/subacute CVAs with mild motor deficits    Client Self Report: Pt saw PCP this week and she recommended doing a stress test. He states he hasn't yet received a call to schedule this.  Pt is also starting with a therapist on  and is working with a functional medicine provider through the [facility].  He feels that his proximal arm has been tightening up much less, and he still feels like his L hand does not open as quickly.  He has been trying to play the piano,and feels that his L hand is much slower than the Right, particularly with the ulnar side of L hand.  He still exhibits anxiety about his BP issues and the course of his stroke recovery but is taking more proactive steps to manage the anxiety.    Objective Measurements:     Objective Measure: Dynavision   Details: Mode A: 59 hits per minute for a 1 minute trial.  Improvement of 9 seconds since last session.       Goals:     Goal Identifier AE for showering   Goal Description Pt will verbalize understanding of recommendations for type and placement of grab bars in his tub/shower, for increased safety and independence with bathing.   Target Date 21   Date Met  21   Progress:  Pt feels that showering is going well with use of suction cup grab bars.  No need for shower chair at this time.  Will complete goal.      Goal Identifier coordination   Goal Description Pt will demo improved L hand dexterity, as measured by a 6 point improvement in 9 hole peg score (starting score 34).     Target Date 21   Date Met      Progress:  Not re-assessed, due to pt report of anxiety with doing timed tests. Will continue to monitor in context of functional hand dexterity.     Goal Identifier dysmetria   Goal  Description Pt will demonstrate decreased symptoms of dysmetria when reaching to targets, as measured by scoring 55 or above on Dynavision Mode A testing.    Target Date 01/31/21   Date Met  02/18/21   Progress: Goal met on 2/18 with score of 59 hits per minute.  Pt does report improved accuracy with reaching/grabbing for objects.  He still has occasional difficulty with motor planning for putting on a shirt.     Goal Identifier med management   Goal Description Pt will implement at least 2 strategies for increased accuracy of taking daily medications, for decreased anxiety and med errors, and improved control of HTN.    Target Date 01/31/21   Date Met  02/18/21   Progress: Pt using a weekly medbox and was educated on the use of 1 additional medbox for meds that are taken outside of AM/PM times.  Goal met.      Progress Toward Goals:   Progress this reporting period: Pt has been seen for 8 visits, with progress as listed above.      Plan:  Continue therapy per current plan of care x4 more weeks.     Discharge:  No

## 2021-02-22 NOTE — PROGRESS NOTES
[facility]      OUTPATIENT OCCUPATIONAL THERAPY  PLAN OF TREATMENT FOR OUTPATIENT REHABILITATION    Patient's Last Name, First Name, M.I.                YOB: 1955  Braulio Guardado                        Provider's Name  [facility] Medical Record No.  [id number]                               Onset Date: 12/9/2021   Start of Care Date: 12/31/2020   Type:     ___PT   _X_OT   ___SLP Medical Diagnosis: Chronic/subacute CVAs with mild motor deficits                                              OT Diagnosis: impaired ADLs and IADLs      _________________________________________________________________________________  Plan of Treatment:    Frequency/Duration: weekly x8 weeks after 1/31/2021     Goals:    Goal Identifier AE for showering   Goal Description Pt will verbalize understanding of recommendations for type and placement of grab bars in his tub/shower, for increased safety and independence with bathing.   Target Date 01/31/21   Date Met  02/18/21   Progress:     Goal Identifier coordination   Goal Description Pt will demo improved L hand dexterity, as measured by a 6 point improvement in 9 hole peg score (starting score 34).     Target Date 01/31/21   Date Met      Progress:     Goal Identifier dysmetria   Goal Description Pt will demonstrate decreased symptoms of dysmetria when reaching to targets, as measured by scoring 55 or above on Dynavision Mode A testing.    Target Date 01/31/21   Date Met  02/18/21   Progress:     Goal Identifier med management   Goal Description Pt will implement at least 2 strategies for increased accuracy of taking daily medications, for decreased anxiety and med errors, and improved control of HTN.    Target Date 01/31/21   Date Met  02/18/21   Progress:       Certification date from 1//31/21 to 3/31/21.    Doug Ang OTR/L         JONES CERTIFY  THE NEED FOR THESE SERVICES FURNISHED UNDER        THIS PLAN OF TREATMENT AND WHILE UNDER MY CARE     (Physician co-signature of this document indicates review and certification of the therapy plan).                Referring Provider: Ana Langley MD

## 2021-02-25 ENCOUNTER — HOSPITAL ENCOUNTER (OUTPATIENT)
Dept: OCCUPATIONAL THERAPY | Facility: CLINIC | Age: 66
Setting detail: THERAPIES SERIES
End: 2021-02-25
Attending: INTERNAL MEDICINE
Payer: COMMERCIAL

## 2021-02-25 PROCEDURE — 97535 SELF CARE MNGMENT TRAINING: CPT | Mod: GO | Performed by: OCCUPATIONAL THERAPIST

## 2021-02-25 PROCEDURE — 97110 THERAPEUTIC EXERCISES: CPT | Mod: GO | Performed by: OCCUPATIONAL THERAPIST

## 2021-03-01 ENCOUNTER — HOSPITAL ENCOUNTER (OUTPATIENT)
Dept: PHYSICAL THERAPY | Facility: CLINIC | Age: 66
Setting detail: THERAPIES SERIES
End: 2021-03-01
Attending: INTERNAL MEDICINE
Payer: COMMERCIAL

## 2021-03-01 PROCEDURE — 97112 NEUROMUSCULAR REEDUCATION: CPT | Mod: GP | Performed by: PHYSICAL THERAPIST

## 2021-03-01 PROCEDURE — 97750 PHYSICAL PERFORMANCE TEST: CPT | Mod: GP | Performed by: PHYSICAL THERAPIST

## 2021-03-01 PROCEDURE — 97110 THERAPEUTIC EXERCISES: CPT | Mod: GP | Performed by: PHYSICAL THERAPIST

## 2021-03-03 ENCOUNTER — DOCUMENTATION ONLY (OUTPATIENT)
Dept: CARE COORDINATION | Facility: CLINIC | Age: 66
End: 2021-03-03

## 2021-03-06 ASSESSMENT — ENCOUNTER SYMPTOMS
DISTURBANCES IN COORDINATION: 1
WEAKNESS: 0
DIZZINESS: 0
PARALYSIS: 0
EYE IRRITATION: 0
TINGLING: 1
SPEECH CHANGE: 0
EYE WATERING: 0
SEIZURES: 0
INSOMNIA: 1
HEADACHES: 0
DOUBLE VISION: 0
DECREASED CONCENTRATION: 0
EYE REDNESS: 0
PANIC: 0
TREMORS: 0
EYE PAIN: 0
MEMORY LOSS: 0
NUMBNESS: 1
NERVOUS/ANXIOUS: 1
DEPRESSION: 1
LOSS OF CONSCIOUSNESS: 0

## 2021-03-09 ENCOUNTER — OFFICE VISIT (OUTPATIENT)
Dept: NEUROLOGY | Facility: CLINIC | Age: 66
End: 2021-03-09
Payer: COMMERCIAL

## 2021-03-09 VITALS
DIASTOLIC BLOOD PRESSURE: 100 MMHG | SYSTOLIC BLOOD PRESSURE: 161 MMHG | WEIGHT: 207 LBS | HEIGHT: 71 IN | OXYGEN SATURATION: 98 % | RESPIRATION RATE: 16 BRPM | HEART RATE: 64 BPM | BODY MASS INDEX: 28.98 KG/M2

## 2021-03-09 DIAGNOSIS — R20.0 LEFT UPPER EXTREMITY NUMBNESS: ICD-10-CM

## 2021-03-09 DIAGNOSIS — Z86.73 HISTORY OF STROKE: ICD-10-CM

## 2021-03-09 DIAGNOSIS — F41.9 ANXIETY: ICD-10-CM

## 2021-03-09 DIAGNOSIS — I10 HYPERTENSION, UNCONTROLLED: Primary | ICD-10-CM

## 2021-03-09 PROCEDURE — 99215 OFFICE O/P EST HI 40 MIN: CPT | Performed by: PSYCHIATRY & NEUROLOGY

## 2021-03-09 RX ORDER — CARVEDILOL 25 MG/1
50 TABLET ORAL 2 TIMES DAILY WITH MEALS
Qty: 30 TABLET | Refills: 11
Start: 2021-03-09 | End: 2022-09-19

## 2021-03-09 RX ORDER — LISINOPRIL 20 MG/1
20 TABLET ORAL 2 TIMES DAILY
Qty: 30 TABLET | Refills: 0
Start: 2021-03-09 | End: 2022-09-19

## 2021-03-09 ASSESSMENT — PAIN SCALES - GENERAL: PAINLEVEL: MILD PAIN (2)

## 2021-03-09 ASSESSMENT — PATIENT HEALTH QUESTIONNAIRE - PHQ9: SUM OF ALL RESPONSES TO PHQ QUESTIONS 1-9: 11

## 2021-03-09 ASSESSMENT — MIFFLIN-ST. JEOR: SCORE: 1756.08

## 2021-03-09 NOTE — LETTER
3/9/2021       RE: Braulio Guardado  [address]  [place] [address]     Dear Colleague,    Thank you for referring your patient, Braulio Guardado, to the [facility] NEUROLOGY CLINIC [place] at [facility]. Please see a copy of my visit note below.        [facility]    Braulio Guardado MRN# [id number]   Age: 65 year old YOB: 1955     Requesting physician: Referred Self  Yoko Rdz            Assessment and Plan:     (I10) Hypertension, uncontrolled  (primary encounter diagnosis)  Comment: needs to be under better control, he is at high risk of repeat stroke. He needs better control with SBP goal less pichardo 140 and DBP goal less than 90  Plan:  He needs to see PCP, I think he needs more medicine to control BP and I question if beta blocker is actually giving him worse mood side effects. If goals not met he needs to meet with a specialist. The highest risk factor for repeat stroke is if blood pressure is not at goal. This is an urgent matter that needs assessment. I know his anxiety dries BP but I don't think his anxiety is going to be fully treated anytime soon so targeting blood pressure treatment is a necessity. He voices understanding      (R20.0) Left upper extremity numbness  Comment: Due to stroke. EMG normal. Continue OT for now. I reviewed cause of symptoms. I will ntoe biceps weakness not noted on the Neuro PGY1 note. The patient remembers exam being breif due to the concern over BP. We need to make sure orthopedic issues of weakness have been ruled out biceps is not typically the most affected muscle form stroke. Defer to PCP but consider ortho referral.    (Z86.73) History of stroke  Comment: Needs blood pressure control, Lipid goal LDL < 100 and stay on statin, antiplatelets and follow up with neuro prn  Plan:  Stop Plavix after 90 days  Stay on Aspirin    Anxiety    Has mental health team, needs more  "treatment. He is resistant to using medications but I don't think his anxiety is fully treated without. Names for psychiatrists provided.    Today I spent 65 minutes caring for the patient including visit length, documentation and record review. I think return to clinic is on a prn basis.     Sugar Gordillo MD             History of Present Illness:   CC: Recheck after stroke    Braulio returns for follow up after a stroke diagnosed in December 2020. He had presented for left hand numbness and weakness. Neuro eval at the time was not felt to be consistent with stroke findings and had recommended relooking at left limb symptoms. He has subsequently had EMG that is normal. He describes a slowed coordination that is improving. At night the 4th and 5th finger get sore. Upper arm gets sore around 3-4 pm in the day. It feels swollen.     He has a PCP is [facility]. His BP is still not under control. His PCP told him not to check at home as it was heightening his anxiety.    He is using clonazepam at night for anxiety but nothing else. Olanzapine prescribed in December not being used. He has tried and failed. Prozac, Remeron and one other he can't recall name of. He is worried about taking medicine and looking for alternate therapies. He went to see a provider but she can only see him eery 3 months or so and I don't think that is enough. He is going to talk therapy as well.             Physical Exam:   BP (!) 161/100   Pulse 64   Resp 16   Ht 1.803 m (5' 11\")   Wt 93.9 kg (207 lb)   SpO2 98%   BMI 28.87 kg/m      Gen: Awake, alert, No acute distress  Neuro: Biceps 4/5, loss of fine motor speed and coordination in left UE. Reflexes in Left UE are brisker than right in triceps, biceps and brachioradialis. Sensation to vibration is normal in the uppers.  He is anxious during interview.         Pertinent History/Data for appointment:       Again, thank you for allowing me to participate in the care of your patient.  "     Sincerely,    Sugar Gordillo MD

## 2021-03-09 NOTE — PATIENT INSTRUCTIONS
Possible Psychiatrists:  [facility] be a possible psychiatry clinic    [facility]  [address], [place], [place], [address] other location  [phone]    Dr Calvin Gould  [phone] [address] [place]

## 2021-03-09 NOTE — NURSING NOTE
Chief Complaint   Patient presents with     RECHECK     [facility] RETURN F/U      Depression Response    Patient completed the PHQ-9 assessment for depression and scored >9? Yes  Question 9 on the PHQ-9 was positive for suicidality? No  Does patient have current mental health provider? Yes    Is this a virtual visit? No    I personally notified the following: visit provider and clinic nurse           Iglesia Nye, EMT

## 2021-03-09 NOTE — PROGRESS NOTES
[facility]    Braulio Guardado MRN# [id number]   Age: 65 year old YOB: 1955     Requesting physician: Referred Self  Yoko Rdz            Assessment and Plan:     (I10) Hypertension, uncontrolled  (primary encounter diagnosis)  Comment: needs to be under better control, he is at high risk of repeat stroke. He needs better control with SBP goal less pichardo 140 and DBP goal less than 90  Plan:  He needs to see PCP, I think he needs more medicine to control BP and I question if beta blocker is actually giving him worse mood side effects. If goals not met he needs to meet with a specialist. The highest risk factor for repeat stroke is if blood pressure is not at goal. This is an urgent matter that needs assessment. I know his anxiety dries BP but I don't think his anxiety is going to be fully treated anytime soon so targeting blood pressure treatment is a necessity. He voices understanding      (R20.0) Left upper extremity numbness  Comment: Due to stroke. EMG normal. Continue OT for now. I reviewed cause of symptoms. I will ntoe biceps weakness not noted on the Neuro PGY1 note. The patient remembers exam being breif due to the concern over BP. We need to make sure orthopedic issues of weakness have been ruled out biceps is not typically the most affected muscle form stroke. Defer to PCP but consider ortho referral.    (Z86.73) History of stroke  Comment: Needs blood pressure control, Lipid goal LDL < 100 and stay on statin, antiplatelets and follow up with neuro prn  Plan:  Stop Plavix after 90 days  Stay on Aspirin    Anxiety    Has mental health team, needs more treatment. He is resistant to using medications but I don't think his anxiety is fully treated without. Names for psychiatrists provided.    Today I spent 65 minutes caring for the patient including visit length, documentation and record review. I think return to clinic is on a prn basis.     Sugar Gordillo,  "MD               History of Present Illness:   CC: Recheck after stroke    Braulio returns for follow up after a stroke diagnosed in December 2020. He had presented for left hand numbness and weakness. Neuro eval at the time was not felt to be consistent with stroke findings and had recommended relooking at left limb symptoms. He has subsequently had EMG that is normal. He describes a slowed coordination that is improving. At night the 4th and 5th finger get sore. Upper arm gets sore around 3-4 pm in the day. It feels swollen.     He has a PCP is [facility]. His BP is still not under control. His PCP told him not to check at home as it was heightening his anxiety.    He is using clonazepam at night for anxiety but nothing else. Olanzapine prescribed in December not being used. He has tried and failed. Prozac, Remeron and one other he can't recall name of. He is worried about taking medicine and looking for alternate therapies. He went to see a provider but she can only see him eery 3 months or so and I don't think that is enough. He is going to talk therapy as well.             Physical Exam:   BP (!) 161/100   Pulse 64   Resp 16   Ht 1.803 m (5' 11\")   Wt 93.9 kg (207 lb)   SpO2 98%   BMI 28.87 kg/m      Gen: Awake, alert, No acute distress  Neuro: Biceps 4/5, loss of fine motor speed and coordination in left UE. Reflexes in Left UE are brisker than right in triceps, biceps and brachioradialis. Sensation to vibration is normal in the uppers.  He is anxious during interview.         Pertinent History/Data for appointment:     Answers for HPI/ROS submitted by the patient on 3/6/2021   General Symptoms: No  Skin Symptoms: No  HENT Symptoms: No  EYE SYMPTOMS: Yes  HEART SYMPTOMS: No  LUNG SYMPTOMS: No  INTESTINAL SYMPTOMS: No  URINARY SYMPTOMS: No  REPRODUCTIVE SYMPTOMS: No  SKELETAL SYMPTOMS: No  BLOOD SYMPTOMS: No  NERVOUS SYSTEM SYMPTOMS: Yes  MENTAL HEALTH SYMPTOMS: Yes  Eye pain: No  Vision loss: Yes  Dry " eyes: No  Watery eyes: No  Eye bulging: No  Double vision: No  Flashing of lights: No  Spots: No  Floaters: Yes  Redness: No  Crossed eyes: No  Tunnel Vision: No  Yellowing of eyes: No  Eye irritation: No  Trouble with coordination: Yes  Dizziness or trouble with balance: No  Fainting or black-out spells: No  Memory loss: No  Headache: No  Seizures: No  Speech problems: No  Tingling: Yes  Tremor: No  Weakness: No  Difficulty walking: No  Paralysis: No  Numbness: Yes  Nervous or Anxious: Yes  Depression: Yes  Trouble sleeping: Yes  Trouble thinking or concentrating: No  Mood changes: Yes  Panic attacks: No

## 2021-03-19 ENCOUNTER — HOSPITAL ENCOUNTER (OUTPATIENT)
Dept: OCCUPATIONAL THERAPY | Facility: CLINIC | Age: 66
Setting detail: THERAPIES SERIES
End: 2021-03-19
Attending: INTERNAL MEDICINE
Payer: COMMERCIAL

## 2021-03-19 PROCEDURE — 97110 THERAPEUTIC EXERCISES: CPT | Mod: GO | Performed by: OCCUPATIONAL THERAPIST

## 2021-03-19 PROCEDURE — 97112 NEUROMUSCULAR REEDUCATION: CPT | Mod: GO | Performed by: OCCUPATIONAL THERAPIST

## 2021-03-19 NOTE — PROGRESS NOTES
"                                                                                [facility]      OUTPATIENT OCCUPATIONAL THERAPY  PLAN OF TREATMENT FOR OUTPATIENT REHABILITATION    Patient's Last Name, First Name, M.I.                YOB: 1955  GeoBraulio  YAW                        Provider's Name  [facility] Medical Record No.  [id number]                               Onset Date: 12/9/21   Start of Care Date: 12/31/21   Type:     ___PT   _X_OT   ___SLP Medical Diagnosis: Chronic/subacute CVAs with mild motor deficits                                OT Diagnosis:  impaired ADLs and IADLs      _________________________________________________________________________________  Objective measures:  L  60# (improved by 4# from eval-is WFL)  L lateral pinch 19# (below average but WFL -1SD)  L palmar pinch 16# (below average but WFL -2SD)  9 Hole Peg Test: 36.8\" (slowed from eval 34\", BNL norms 16.0-26.0\"))    Plan of Treatment:    Frequency/Duration: up to 6 more visits in 12 weeks     Goals:    Goal Identifier coordination   Goal Description Pt will demo improved L hand dexterity, as measured by a 6 second improvement in 9 hole peg score (starting score 34 seconds).     Target Date 03/31/21; extend to 6/12/21   Date Met      Progress:     Goal Identifier     Goal Description  Pt will demonstrate improved functional strength in L UE to report return to IADL tasks with no greater than min fatigue that resolves with 2 hours   Target Date  6/12/21   Date Met      Progress:     Goal Identifier     Goal Description  Pt will demonstrate understanding of at least 2 methods to manage anxiety and improve attention/recall   Target Date  6/12/21   Date Met      Progress:       Progress Toward Goals:   Progress this reporting period: Pt has been seen for 2 visits since his last cert/progress note was completed. New goal being added to POC and so new cert being " completed.    Certification date from 3/20/21 to 6/12/21.    Guilherme Valladares, OTR/L, MSCS          I CERTIFY THE NEED FOR THESE SERVICES FURNISHED UNDER        THIS PLAN OF TREATMENT AND WHILE UNDER MY CARE     (Physician co-signature of this document indicates review and certification of the therapy plan).                Referring Provider: Ana Langley MD

## 2021-03-26 ENCOUNTER — HOSPITAL ENCOUNTER (OUTPATIENT)
Dept: OCCUPATIONAL THERAPY | Facility: CLINIC | Age: 66
Setting detail: THERAPIES SERIES
End: 2021-03-26
Attending: INTERNAL MEDICINE
Payer: COMMERCIAL

## 2021-03-26 PROCEDURE — 97110 THERAPEUTIC EXERCISES: CPT | Mod: GO | Performed by: OCCUPATIONAL THERAPIST

## 2021-03-26 PROCEDURE — 97535 SELF CARE MNGMENT TRAINING: CPT | Mod: GO | Performed by: OCCUPATIONAL THERAPIST

## 2021-04-01 ENCOUNTER — HOSPITAL ENCOUNTER (OUTPATIENT)
Dept: PHYSICAL THERAPY | Facility: CLINIC | Age: 66
Setting detail: THERAPIES SERIES
End: 2021-04-01
Attending: INTERNAL MEDICINE
Payer: COMMERCIAL

## 2021-04-01 PROCEDURE — 97112 NEUROMUSCULAR REEDUCATION: CPT | Mod: GP | Performed by: PHYSICAL THERAPIST

## 2021-04-01 NOTE — DISCHARGE INSTRUCTIONS
Start taking your blood pressure at home 1x per week and record it.    Ideal blood pressure is 120/80    Contact your doctor if you are consistently reading in the 140s for the top number.        You can't blame everything on the stroke  - We are more worried if things are really consistently different. Not a one time pain that goes away.      Work with primary care and psych team about sleep regulation

## 2021-04-01 NOTE — IP AVS SNAPSHOT
After Visit Summary Template Not Found    This Print Group is only intended to be used in the After Visit Summary and can only be used in a report that uses a released After Visit Summary Template.                       MRN:[id number]                      After Visit Summary   4/1/2021    Braulio Guardado    MRN: [id number]           Visit Information        Provider Department      4/1/2021  8:00 AM Sheila Woody, PT [facility]        Your next 10 appointments already scheduled    Apr 02, 2021  9:00 AM  Neuro Treatment with Guilherme Valladares OT  [facility] ([facility] ) [address], [address]  [place] [address]  [phone]      Apr 16, 2021  9:00 AM  Neuro Treatment with Guilherme Valladares OT  [facility] ([facility] ) [address], [address]  [place] [address]  [phone]      Apr 30, 2021  9:00 AM  Neuro Treatment with Guilherme Valladares OT  [facility] ([facility] ) [address], [address]  [place] [address]  [phone]           Further instructions from your care team       Start taking your blood pressure at home 1x per week and record it.    Ideal blood pressure is 120/80    Contact your doctor if you are consistently reading in the 140s for the top number.        You can't blame everything on the stroke  - We are more worried if things are really consistently different. Not a one time pain that goes away.      Work with primary care and psych team about sleep regulation          MyChart Information    [employer] gives you secure access to your electronic health record. If you see a primary care provider, you can also send messages to your care team and make appointments. If you have questions,  please call your primary care clinic.  If you do not have a primary care provider, please call [phone] and they will assist you.       Care EveryWhere ID    This is your Care EveryWhere ID. This could be used by other organizations to access your [place] medical records  [id number]       Equal Access to Services    MANJUZAIDA SONU : Hadii monica ku hadasho Soomaali, waaxda luqadaha, qaybta kaalmada adeegyada, waxay idiin hayearljeramy jacobhemantumm patiño. So [facility] [phone].    ATENCIÓN: Si habla español, tiene a grant disposición servicios gratuitos de asistencia lingüística. Llame al [phone].    We comply with applicable federal and state civil rights laws, including the [place] Human Rights Act. We do not discriminate on the basis of race, color, creed, Lutheran, national origin, marital status, age, disability, sex, sexual orientation, or gender identity.    If you would like an itemization of your charges they will now be available in [employer] 30 days after discharge. To access the itemized statements in [employer] go to billing/billing summary. From there select view account. There will be multiple tabs showing an overview of your account, detail, payments, and communications. From the communications tab you can see your monthly statements, your itemized statements, and any billing letters generated for your account. If you do not have a [employer] account and need help getting access please contact [employer] support at [phone].  If you would prefer to have your itemized statements mailed please contact our automated itemized bill request line at [phone] option  2.

## 2021-04-01 NOTE — PROGRESS NOTES
Outpatient Physical Therapy Discharge Note     Patient: Braulio Guardado  : 1955    Beginning/End Dates of Reporting Period:  2020 to 2021 (6 visits)    Referring Provider: Ana Langley MD     Therapy Diagnosis: L UE/LE deficits s/p CVA's     Client Self Report: Walking indoors 13 mins every morning, taking dog for a walk. Been feeling good overall.    Objective Measurements:  Objective Measure: FGA  Details: 3030(3/1/2021)    Objective Measure: 25' walk  Details: 6.47 secs(3/1/2021)    Objective Measure: 6MWT  Details: 1874' no AD(3/1/2021)     Goals:  Goal Identifier L UE strength    Goal Description Pt will score 5/5 on all UE MMT in order to improve srength to perform daily activities such as shoveling, dressing, lifting, etc.    Target Date 21   Date Met  21   Progress: Cont working with OT for maximizing UE strength and function.     Goal Identifier HEP/ education   Goal Description Pt to be independent in HEP to improve strength and stamina and demo/verbalize being able to monitor his own BP daily (also during exercise) to ensure safety of exercise progression.   Target Date 21   Date Met  21   Progress: Pt indep with balance and cardio program.     Goal Identifier Walking    Goal Description Pt to report resuming normal walking routine by the river with no imbalance and demo improved function/movement of L ankle, espeically DF movement.    Target Date 21   Date Met  21   Progress:       Progress Toward Goals:   Progress this reporting period: balance to WNL, increasing walking tolerance, better BP control.    Plan:  Discharge from therapy.    Discharge:    Reason for Discharge: Patient has met all goals.    Equipment Issued: none    Discharge Plan: Patient to continue home program.

## 2021-04-02 ENCOUNTER — HOSPITAL ENCOUNTER (OUTPATIENT)
Dept: OCCUPATIONAL THERAPY | Facility: CLINIC | Age: 66
Setting detail: THERAPIES SERIES
End: 2021-04-02
Attending: INTERNAL MEDICINE
Payer: COMMERCIAL

## 2021-04-02 PROCEDURE — 97535 SELF CARE MNGMENT TRAINING: CPT | Mod: GO | Performed by: OCCUPATIONAL THERAPIST

## 2021-04-30 ENCOUNTER — HOSPITAL ENCOUNTER (OUTPATIENT)
Dept: OCCUPATIONAL THERAPY | Facility: CLINIC | Age: 66
Setting detail: THERAPIES SERIES
End: 2021-04-30
Attending: INTERNAL MEDICINE
Payer: COMMERCIAL

## 2021-04-30 PROCEDURE — 97535 SELF CARE MNGMENT TRAINING: CPT | Mod: GO | Performed by: OCCUPATIONAL THERAPIST

## 2021-05-12 NOTE — PLAN OF CARE
"Observation goals PRIOR TO DISCHARGE    Comments:      -diagnostic tests and consults completed and resulted: met     -vital signs normal or at patient baseline: no/pending  Blood pressure (!) 150/94, pulse 62, temperature 98.5  F (36.9  C), temperature source Oral, resp. rate 20, height 1.803 m (5' 11\"), weight 92 kg (202 lb 13.2 oz), SpO2 95 %.        -tolerating oral intake to maintain hydration: yes      -BP improved: yes      -Psych consult has been completed: met     -returns to baseline functional status: met  Was c/o bilateral numbness in cheeks  Neurological no deficits noted in face, no asymmetry. On further assessment, no significant numbness noted in sensation of face (slight numbness in left check compared to right; no asymmetry)  Encouraged continued monitoring. Patient understanding and agreeable.     -safe disposition plan has been identified: yes   " [place]

## 2021-05-21 ENCOUNTER — HOSPITAL ENCOUNTER (OUTPATIENT)
Dept: OCCUPATIONAL THERAPY | Facility: CLINIC | Age: 66
Setting detail: THERAPIES SERIES
End: 2021-05-21
Attending: INTERNAL MEDICINE
Payer: COMMERCIAL

## 2021-05-21 PROCEDURE — 97110 THERAPEUTIC EXERCISES: CPT | Mod: GO | Performed by: OCCUPATIONAL THERAPIST

## 2021-05-21 PROCEDURE — 97535 SELF CARE MNGMENT TRAINING: CPT | Mod: GO | Performed by: OCCUPATIONAL THERAPIST

## 2021-06-18 ENCOUNTER — HOSPITAL ENCOUNTER (OUTPATIENT)
Dept: OCCUPATIONAL THERAPY | Facility: CLINIC | Age: 66
Setting detail: THERAPIES SERIES
End: 2021-06-18
Attending: INTERNAL MEDICINE
Payer: COMMERCIAL

## 2021-06-18 PROCEDURE — 97535 SELF CARE MNGMENT TRAINING: CPT | Mod: GO | Performed by: OCCUPATIONAL THERAPIST

## 2021-06-18 PROCEDURE — 97110 THERAPEUTIC EXERCISES: CPT | Mod: GO | Performed by: OCCUPATIONAL THERAPIST

## 2021-06-18 NOTE — PROGRESS NOTES
"                                                                                [facility]      OUTPATIENT OCCUPATIONAL THERAPY  PLAN OF TREATMENT FOR OUTPATIENT REHABILITATION    Patient's Last Name, First Name, M.I.                YOB: 1955  Braulio Guardado                        Provider's Name  [facility] Medical Record No.  [id number]                               Onset Date: 12/9/21   Start of Care Date: 12/31/21   Type:     ___PT   _X_OT   ___SLP Medical Diagnosis: Chronic/subacute CVAs with mild motor deficits                                OT Diagnosis: impaired ADLs and IADLs      _________________________________________________________________________________  Objective Measures:  4/30/21   :  L 50#, R 62# (scores WFL but  is below average.  Pinch:  L Lateral -21, L 3 point -19, both WFL but  below average, both improved from last assessment  9 hole peg test  L 33.4\"-BNL norm is 16-26\", improved from last assessment    5/21/21  UE strength  R UE: 5/5; L UE: shoulder flex 4+/5, abd 4+/5, ext 4+/5, ext rot 4+/5, int rot 4/5 elbow ext 5/5, elbow flex 5/5, wrist ext 5/5 and wrist flex 5/5, forearm rot 4+/5;     Plan of Treatment:    Frequency/Duration: Plan to extend POC for up to 4 visits in 12 weeks.     Goals:    Goal Identifier coordination   Goal Description Pt will demo improved L hand dexterity, as measured by a 6 point improvement in 9 hole peg score (starting score 34).     Target Date 06/12/21; extend to 9/5/21   Date Met      Progress:L hand FM skill score has varied and now most recently improved to 33.4\" which remains BNL with tendency to use over pronated positioning instead of in hand manipulation at times.     Goal Identifier     Goal Description Pt will demonstrate improved functional strength in L UE to report return to IADL tasks with no greater than min fatigue that resolves with 2 hours   Target Date 06/12/21; extend " to 9/5/21   Date Met      Progress: Strength is improving with exception of a drop in  strength in L hand recently. Pt does note fatigue with at is ongoing in L UE and deconditioning related to depression may be contributing.     Goal Identifier     Goal Description Pt will demonstrate understanding of at least 2 methods to manage anxiety and improve attention/recall   Target Date 06/12/21; extend to 9/5/21   Date Met      Progress:Pt is reporting seeing a counselor for mental health treatment, using deep breathing and doing yoga to manage anxiety and depression.         Progress Toward Goals:   Progress this reporting period: Pt has been seen in OT for 4 visits since last certification completed 3/19/21.Pt has made progress in L UE coordination, pinch and UE strength and function. He continues to struggle with depression reporting that anxiety is improved, although it is still apparent in sessions as he can get easily overwhelmed.  He does report he has been active in gardening in his yard and doing his HEP advanced for his L UE but does report ongoing fatigue and tightness in his L UE. He has benefitted from training in methods to address fatigue and energy management. 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 Pt remains very appropriate for ongoing OT to address daily living skills and goals above finalizing HEP for L UE, memory compensation training, training in further mental health resources and methods to manage fatigue.    Certification date from 6/13/21 to 9/5/21.    Guilherme Valladares OTR/L          I CERTIFY THE NEED FOR THESE SERVICES FURNISHED UNDER        THIS PLAN OF TREATMENT AND WHILE UNDER MY CARE     (Physician co-signature of this document indicates review and certification of the therapy plan).                Referring Provider:   Yoko Savage (primary MD that follows patient now)-former referring MD Ana Langley was inpatient stay MD)

## 2021-08-13 ENCOUNTER — HOSPITAL ENCOUNTER (OUTPATIENT)
Dept: OCCUPATIONAL THERAPY | Facility: CLINIC | Age: 66
Setting detail: THERAPIES SERIES
End: 2021-08-13
Attending: INTERNAL MEDICINE
Payer: COMMERCIAL

## 2021-08-13 PROCEDURE — 97530 THERAPEUTIC ACTIVITIES: CPT | Mod: GO | Performed by: OCCUPATIONAL THERAPIST

## 2021-08-13 PROCEDURE — 97110 THERAPEUTIC EXERCISES: CPT | Mod: GO | Performed by: OCCUPATIONAL THERAPIST

## 2021-09-14 ENCOUNTER — HOSPITAL ENCOUNTER (OUTPATIENT)
Dept: OCCUPATIONAL THERAPY | Facility: CLINIC | Age: 66
Setting detail: THERAPIES SERIES
End: 2021-09-14
Attending: INTERNAL MEDICINE
Payer: COMMERCIAL

## 2021-09-14 PROCEDURE — 97530 THERAPEUTIC ACTIVITIES: CPT | Mod: GO | Performed by: OCCUPATIONAL THERAPIST

## 2021-09-14 PROCEDURE — 97535 SELF CARE MNGMENT TRAINING: CPT | Mod: GO | Performed by: OCCUPATIONAL THERAPIST

## 2021-09-14 PROCEDURE — 97110 THERAPEUTIC EXERCISES: CPT | Mod: GO | Performed by: OCCUPATIONAL THERAPIST

## 2021-09-16 NOTE — PROGRESS NOTES
OUTPATIENT OCCUPATIONAL THERAPY  PLAN OF TREATMENT FOR OUTPATIENT REHABILITATION AND PROGRESS NOTE    Patient's Last Name, First Name, Braulio Olvera Date of Birth  1955   Provider's Name  [facility] Medical Record No.  [id number]    Onset Date  12/9/21 Start of Care Date  12/31/21   Type:     __PT   _X_OT   __SLP Medical Diagnosis  Chronic/subacute CVAs with mild motor deficits            OT Diagnosis  impaired ADLs and IADLs Plan of Treatment  Frequency/Duration: up to 3 sessions in 10 weeks  Certification date from 9/6/21 to 11/15/21     Goals:    Goal Identifier coordination   Goal Description Pt will demo improved L hand dexterity, as measured by a 6 point improvement in 9 hole peg score (starting score 34).     Target Date 09/05/21   Date Met  06/18/21   Progress (detail required for progress note):       Goal Identifier     Goal Description Pt will demonstrate improved functional strength in L UE to report return to IADL tasks with no greater than min fatigue that resolves with 2 hours   Target Date 9/5/21; extend to11/15/21   Date Met      Progress (detail required for progress note):  Pt feels overall fatigue and also L UE fatigue that limits him to 3 overall larger activities per day but does not resolve within 2 hours per his report and lends to greater than min fatigue. He has been active with grocery shopping, gardening, cleaning his home as able but does get distal L UE fatigue and cramping     Goal Identifier     Goal Description Pt will demonstrate understanding of at least 2 methods to manage anxiety and improve attention/recall   Target Date 9/5/21   Date Met   8/13/21   Progress (detail required for progress note):  Met-Pt's anxiety overall has improved significantly from onset of OT. He is attending therapy for somatic experiencing and attends throughout his therapy sessions. He uses yoga, diaphragmatic breathing, positive self  "affirmations to help, amongst other things.     Goal Identifier  new   Goal Description  Pt will demonstrate understanding of at least 2 methods to manage fatigue with daily living activities to report ability to complete daily living skills more consistently 3 days per week.   Target Date  11/15/21   Date Met      Progress (detail required for progress note):           Beginning/End Dates of Progress Note Reporting Period:  6/13/21 to 9/5/21    Progress Toward Goals:   Progress this reporting period: Pt has made goals in L UE strength and coordination. His mental health varies which limits his attainments with coordination consistently and he continues to experience stroke related fatigue as well that affects endurance. He remains appropriate for OT to address goals above.    Client Self (Subjective) Report for Progress Note Reporting Period: I have been swimming 3 times per week and trying to walk my dog but not as much as I want to.  My left hand still feels slow with activities and gets tired.      Objective Measurements:   Objective Measure:    Details: L 65# and R 71# (improved from OT eval L 56# and R 66#)           Objective Measure: 9 HPT on 6/18/21   Details:  L 28.8\" (norm 16-26\")-improved from 34\" on OT eval 12/31/20; has been variable since then up to 35\" on 8/13/21 given patients depression which lends to inactivity                   I CERTIFY THE NEED FOR THESE SERVICES FURNISHED UNDER        THIS PLAN OF TREATMENT AND WHILE UNDER MY CARE     (Physician co-signature of this document indicates review and certification of the therapy plan).                Referring Provider: Dr. Yoko Savage (primary MD that follows patient now)-former referring MD Ana Langley was inpatient stay MD)    Guilherme Valladares, OTR/L     "

## 2021-10-24 ENCOUNTER — HEALTH MAINTENANCE LETTER (OUTPATIENT)
Age: 66
End: 2021-10-24

## 2021-11-09 ENCOUNTER — TELEPHONE (OUTPATIENT)
Dept: PHYSICAL THERAPY | Facility: CLINIC | Age: 66
End: 2021-11-09
Payer: COMMERCIAL

## 2021-11-16 ENCOUNTER — HOSPITAL ENCOUNTER (OUTPATIENT)
Dept: OCCUPATIONAL THERAPY | Facility: CLINIC | Age: 66
Setting detail: THERAPIES SERIES
End: 2021-11-16
Attending: INTERNAL MEDICINE
Payer: COMMERCIAL

## 2021-11-16 PROCEDURE — 97535 SELF CARE MNGMENT TRAINING: CPT | Mod: GO,59 | Performed by: OCCUPATIONAL THERAPIST

## 2021-11-16 PROCEDURE — 97110 THERAPEUTIC EXERCISES: CPT | Mod: GO | Performed by: OCCUPATIONAL THERAPIST

## 2021-11-16 PROCEDURE — 97530 THERAPEUTIC ACTIVITIES: CPT | Mod: GO | Performed by: OCCUPATIONAL THERAPIST

## 2021-11-16 NOTE — PROGRESS NOTES
"Outpatient Occupational Therapy Discharge Note     Patient: Braulio Guardado  : 1955    Beginning/End Dates of Reporting Period:  21 to 21    Referring Provider: Dr. Yoko Savage     Therapy Diagnosis: Chronic/subacute CVAs with mild motor deficits with impaired ADL/IADL    Client Self Report: I feel like my hand is a little bit better, it is a little less clumsy and no I can do an hour with my left hand, I'm not dropping things quite as easily. The last week or so it has been good. It doesn't feel as frozen.    Objective Measurements:  21:   :  L 65# and R 71#   9 Hole Peg Test:  L 35\"    Goals:     Goal Identifier     Goal Description Pt will demonstrate improved functional strength in L UE to report return to IADL tasks with no greater than min fatigue that resolves with 2 hours   Target Date 21   Date Met  21   Progress (detail required for progress note):  Pt is using L UE for up to an hour at a time now, gardening, shoveling, sweeping (heavier work) before he fatigues and needs to rest. Can do other smaller tasks within the day and is exercising, swimming laps, doing UE exer in the pool/hottub and at home. Functional coordination improved     Goal Identifier     Goal Description Pt will demonstrate understanding of at least 2 methods to manage anxiety and improve attention/recall   Target Date 21   Date Met   21   Progress (detail required for progress note):  Pt notes his attention and memory improved and this is noted with observation in sessions as well and with carry over of his HEP. He has worked on methods to manage his anxiety in therapy but mainly is working outside of therapy with a trauma specialist that is helping him a great deal.         Plan:  Discharge from therapy.    Discharge:    Reason for Discharge: Patient has met all goals.    Equipment Issued: theraband    Discharge Plan: Patient to continue home program.  "

## 2022-02-13 ENCOUNTER — HEALTH MAINTENANCE LETTER (OUTPATIENT)
Age: 67
End: 2022-02-13

## 2022-09-07 ENCOUNTER — NURSE TRIAGE (OUTPATIENT)
Dept: NURSING | Facility: CLINIC | Age: 67
End: 2022-09-07

## 2022-09-07 NOTE — TELEPHONE ENCOUNTER
Nurse Triage SBAR    Is this a 2nd Level Triage?  Yes     Situation:  Stressed out/High anxiety/Not able to relax  Felt like there was tightness in his left arm that does not work as well since he had a CVA.       Background/Assessment:     Pt calling very frantic while on the phone.   Pt worried he was having another stroke.  Felt like his left arm was tighter then it was before he had his last stroke.   Was very stressed out and anxious while on the phone.  Was talking really fast while he was on the phone.    Worried about the symptoms he was having.  I asked to explain his symptoms to me.      He mentioned it was more anxiety related.  I had him take a  clonazePAM (KLONOPIN) 0.5 MG tablet while we were on the phone to help him relax.    He had no headache, increased weakness on either side of his body or his face.   No loss of speech or grabbled speech while on the phone.   No confusion, dizziness, or disorientation noted.  No loss of control of urine or bowels.       No increased tingling or weakness in the left arm that was affected by the stroke he had over a year ago. No neck or back pain noted.    I mentioned it maybe more anxiety related and he needed to follow up with his PCP for anxiety issues.      So Pt was going to call his Primary Care Provider today for an office visit today.       Pt has a visit with his therapist today for counseling session.     Pt has a visit with Neurology on 9/12/2022 for a Video chat.       Pt wondering if it could be changed to an IN PERSON Visit at the office and could it be moved up sooner with any provider for Pt care.    Please call Pt back @ [phone] for further assistance for Pt care.    Rin Lock RN  Central Triage Red Flags/Med Refills      Protocol Recommended Disposition:   See in Office Today        Reason for Disposition    Patient wants to be seen    Additional Information    Negative: Difficult to awaken or acting confused (e.g., disoriented, slurred  speech)    Negative: New neurologic deficit that is present NOW, sudden onset of ANY of the following: * Weakness of the face, arm, or leg on one side of the body* Numbness of the face, arm, or leg on one side of the body* Loss of speech or garbled speech    Negative: Sounds like a life-threatening emergency to the triager    Negative: Confusion, disorientation, or hallucinations is main symptom    Negative: Dizziness is main symptom    Negative: Followed a head injury within last 3 days    Negative: Headache (with neurologic deficit)    Negative: Unable to urinate (or only a few drops) and bladder feels very full    Negative: Loss of bladder or bowel control (urine or bowel incontinence; wetting self, leaking stool) of new-onset    Negative: Back pain with numbness (loss of sensation) in groin or rectal area    Negative: Neurologic deficit that was brief (now gone), ANY of the following: * Weakness of the face, arm, or leg on one side of the body * Numbness of the face, arm, or leg on one side of the body * Loss of speech or garbled speech    Negative: Patient sounds very sick or weak to the triager    Negative: Neurologic deficit of gradual onset (e.g., days to weeks), ANY of the following: * Weakness of the face, arm, or leg on one side of the body* Numbness of the face, arm, or leg on one side of the body* Loss of speech or garbled speech    Negative: [place] palsy suspected (i.e., weakness only one side of the face, developing over hours to days, no other symptoms)    Negative: Tingling (e.g., pins and needles) of the face, arm or leg on one side of the body, that is present now (Exceptions: Chronic or recurrent symptom lasting > 4 weeks; or tingling from known cause, such as: bumped elbow, carpal tunnel syndrome, pinched nerve, frostbite.)    Negative: Neck pain (with neurologic deficit)    Negative: Back pain (with neurologic deficit)    Protocols used: NEUROLOGIC DEFICIT-A-[place]

## 2022-09-07 NOTE — TELEPHONE ENCOUNTER
I called pt to follow up on triage RN note. He said he is feeling better. He took the clonazepam and just left his visit with his therapist. He feels his symptoms are anxiety/PTSD related. After review of Triage note and discussion with patient agree symptoms appear more from anxiety then neurologic cause.     Pt wanted to set up in person visit with Dr. Gordillo. Scheduled on 9/19 at 9am in person; moved video visit from 9/12.     Vicki STYLES

## 2022-09-19 ENCOUNTER — OFFICE VISIT (OUTPATIENT)
Dept: NEUROLOGY | Facility: CLINIC | Age: 67
End: 2022-09-19
Payer: COMMERCIAL

## 2022-09-19 VITALS
HEART RATE: 77 BPM | WEIGHT: 196 LBS | OXYGEN SATURATION: 98 % | SYSTOLIC BLOOD PRESSURE: 135 MMHG | DIASTOLIC BLOOD PRESSURE: 84 MMHG | RESPIRATION RATE: 16 BRPM | BODY MASS INDEX: 27.34 KG/M2

## 2022-09-19 DIAGNOSIS — Z86.73 HISTORY OF STROKE: ICD-10-CM

## 2022-09-19 DIAGNOSIS — I10 BENIGN ESSENTIAL HYPERTENSION: Primary | ICD-10-CM

## 2022-09-19 DIAGNOSIS — F41.1 GAD (GENERALIZED ANXIETY DISORDER): ICD-10-CM

## 2022-09-19 PROCEDURE — 99213 OFFICE O/P EST LOW 20 MIN: CPT | Performed by: PSYCHIATRY & NEUROLOGY

## 2022-09-19 RX ORDER — LISINOPRIL 40 MG/1
TABLET ORAL
COMMUNITY
Start: 2022-07-31

## 2022-09-19 RX ORDER — AMLODIPINE BESYLATE 10 MG/1
TABLET ORAL
COMMUNITY
Start: 2022-07-30

## 2022-09-19 RX ORDER — CHLORTHALIDONE 25 MG/1
TABLET ORAL
COMMUNITY
Start: 2022-07-22

## 2022-09-19 RX ORDER — ROSUVASTATIN CALCIUM 5 MG/1
5 TABLET, COATED ORAL DAILY
COMMUNITY

## 2022-09-19 RX ORDER — CHLORAL HYDRATE 500 MG
2000 CAPSULE ORAL
COMMUNITY
Start: 2022-02-01

## 2022-09-19 RX ORDER — ROSUVASTATIN CALCIUM 10 MG/1
TABLET, COATED ORAL
COMMUNITY
Start: 2022-07-31

## 2022-09-19 ASSESSMENT — PAIN SCALES - GENERAL: PAINLEVEL: NO PAIN (0)

## 2022-09-19 NOTE — PROGRESS NOTES
[facility]    Braulio Guardado MRN# [id number]   Age: 66 year old YOB: 1955     Requesting physician: Referred Self  Yoko Rdz            Assessment and Plan:     (I10) Benign essential hypertension  (primary encounter diagnosis)  Comment: Managed by PCP, doing better    (F41.1) BENITEZ (generalized anxiety disorder)  Comment: Managed by PCP      (Z86.73) History of stroke  Comment: Discussed with the patient symptoms may fluctuate during times of both physical and psychological stress.   Plan: Reviewed when he should be going to ED for new and different acute stroke symptoms.     Sugar Gordillo MD           History of Present Illness:   CC: Recheck    Patient has had a stroke in the past and has subsequently struggled with residual symptoms of left sided weakness and numbness.     He has fluctuating symptoms under stress and has a great deal of anxiety that he is having further stroke. He has a lot of health related anxiety and booked this appointment to discuss some recent concerns he had. He reports that since he booked the appointment he has decided his recent worsening was due to heightened anxiety and the new symptoms are better now.    Blood pressure doing better. He is enjoying working with Dr Caraballo and trusts her to provide good care.          Physical Exam:     /84   Pulse 77   Resp 16   Wt 88.9 kg (196 lb)   SpO2 98%   BMI 27.34 kg/m    Awake, anxious   Normal recall  Gait stable         Pertinent History/Data for appointment:

## 2022-09-19 NOTE — LETTER
9/19/2022       RE: Braulio Guardado  [address]  [place] [address]     Dear Colleague,    Thank you for referring your patient, Braulio Guardado, to the [facility] [place] [facility]. Please see a copy of my visit note below.        [facility]    Braulio Guardado MRN# [id number]   Age: 66 year old YOB: 1955     Requesting physician: Referred Self  Yoko Rdz            Assessment and Plan:     (I10) Benign essential hypertension  (primary encounter diagnosis)  Comment: Managed by PCP, doing better    (F41.1) BENITEZ (generalized anxiety disorder)  Comment: Managed by PCP      (Z86.73) History of stroke  Comment: Discussed with the patient symptoms may fluctuate during times of both physical and psychological stress.   Plan: Reviewed when he should be going to ED for new and different acute stroke symptoms.     Sugar Gordillo MD           History of Present Illness:   CC: Recheck    Patient has had a stroke in the past and has subsequently struggled with residual symptoms of left sided weakness and numbness.     He has fluctuating symptoms under stress and has a great deal of anxiety that he is having further stroke. He has a lot of health related anxiety and booked this appointment to discuss some recent concerns he had. He reports that since he booked the appointment he has decided his recent worsening was due to heightened anxiety and the new symptoms are better now.    Blood pressure doing better. He is enjoying working with Dr Caraballo and trusts her to provide good care.          Physical Exam:     /84   Pulse 77   Resp 16   Wt 88.9 kg (196 lb)   SpO2 98%   BMI 27.34 kg/m    Awake, anxious   Normal recall  Gait stable         Pertinent History/Data for appointment:             Sincerely,    Sugar Gordillo MD

## 2025-05-24 ENCOUNTER — HEALTH MAINTENANCE LETTER (OUTPATIENT)
Age: 70
End: 2025-05-24